# Patient Record
Sex: FEMALE | Race: WHITE
[De-identification: names, ages, dates, MRNs, and addresses within clinical notes are randomized per-mention and may not be internally consistent; named-entity substitution may affect disease eponyms.]

---

## 2017-01-31 ENCOUNTER — RX ONLY (OUTPATIENT)
Age: 52
Setting detail: RX ONLY
End: 2017-01-31

## 2017-02-13 RX ORDER — MONTELUKAST SODIUM 10 MG/1
TABLET ORAL
Qty: 90 TAB | Refills: 3 | Status: SHIPPED | OUTPATIENT
Start: 2017-02-13 | End: 2018-03-16 | Stop reason: SDUPTHER

## 2017-02-14 PROBLEM — D49.2 NEOPLASM OF UNSPECIFIED BEHAVIOR OF BONE, SOFT TISSUE, AND SKIN: Status: RESOLVED | Noted: 2017-01-31 | Resolved: 2017-02-14

## 2017-03-07 RX ORDER — SIMVASTATIN 40 MG
TABLET ORAL
Qty: 90 TAB | Refills: 3 | Status: SHIPPED | OUTPATIENT
Start: 2017-03-07 | End: 2017-08-21 | Stop reason: SINTOL

## 2017-08-21 DIAGNOSIS — E78.2 MIXED HYPERLIPIDEMIA: ICD-10-CM

## 2017-08-21 RX ORDER — PRAVASTATIN SODIUM 40 MG
40 TABLET ORAL
Qty: 90 TAB | Refills: 3 | Status: SHIPPED | OUTPATIENT
Start: 2017-08-21 | End: 2017-12-05 | Stop reason: SINTOL

## 2017-11-01 ENCOUNTER — APPOINTMENT (OUTPATIENT)
Dept: MEDICAL GROUP | Facility: PHYSICIAN GROUP | Age: 52
End: 2017-11-01
Payer: COMMERCIAL

## 2017-11-28 RX ORDER — CITALOPRAM 20 MG/1
TABLET ORAL
Qty: 90 TAB | Refills: 3 | Status: SHIPPED | OUTPATIENT
Start: 2017-11-28 | End: 2019-01-07 | Stop reason: SDUPTHER

## 2017-12-05 ENCOUNTER — OFFICE VISIT (OUTPATIENT)
Dept: MEDICAL GROUP | Facility: PHYSICIAN GROUP | Age: 52
End: 2017-12-05

## 2017-12-05 VITALS
TEMPERATURE: 97.9 F | RESPIRATION RATE: 14 BRPM | WEIGHT: 184 LBS | HEART RATE: 72 BPM | DIASTOLIC BLOOD PRESSURE: 78 MMHG | HEIGHT: 66 IN | BODY MASS INDEX: 29.57 KG/M2 | SYSTOLIC BLOOD PRESSURE: 142 MMHG | OXYGEN SATURATION: 95 %

## 2017-12-05 DIAGNOSIS — E78.2 MIXED HYPERLIPIDEMIA: ICD-10-CM

## 2017-12-05 DIAGNOSIS — J30.1 ACUTE SEASONAL ALLERGIC RHINITIS DUE TO POLLEN: ICD-10-CM

## 2017-12-05 DIAGNOSIS — R25.2 MUSCLE CRAMPS: ICD-10-CM

## 2017-12-05 DIAGNOSIS — J45.20 MILD INTERMITTENT ASTHMA WITHOUT COMPLICATION: ICD-10-CM

## 2017-12-05 DIAGNOSIS — M51.9 THORACIC DISC DISEASE: ICD-10-CM

## 2017-12-05 PROCEDURE — 99213 OFFICE O/P EST LOW 20 MIN: CPT | Performed by: FAMILY MEDICINE

## 2017-12-05 RX ORDER — UBIDECARENONE 100 MG
1 CAPSULE ORAL DAILY
Qty: 30 CAP | Refills: 3
Start: 2017-12-05 | End: 2018-08-28

## 2017-12-05 RX ORDER — ROSUVASTATIN CALCIUM 10 MG/1
10 TABLET, COATED ORAL EVERY EVENING
Qty: 90 TAB | Refills: 3 | Status: SHIPPED | OUTPATIENT
Start: 2017-12-05 | End: 2019-02-21 | Stop reason: SDUPTHER

## 2017-12-05 ASSESSMENT — PATIENT HEALTH QUESTIONNAIRE - PHQ9: CLINICAL INTERPRETATION OF PHQ2 SCORE: 0

## 2017-12-06 NOTE — PATIENT INSTRUCTIONS
Patient given written instructions regarding medications, referrals, dietary and lifestyle management, and return visit.    Sukumar Heller MD

## 2017-12-06 NOTE — PROGRESS NOTES
Patient comes in stating that she unfortunately got muscle cramps on pravastatin although they were not as bad as the cramps and been on simvastatin. She has also been seeing Dr. Gongora at the [facility] for disc disease in her thoracic spine and she's gotten epidurals which are helping with that. I'm concerned about the muscle cramps with the pravastatin now however.  She is actually quite a healthy person.  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    I reviewed the following    Past Medical History:   Diagnosis Date   • Depression         Past Surgical History:   Procedure Laterality Date   • TUBAL COAGULATION LAPAROSCOPIC BILATERAL  1994       Allergies   Allergen Reactions   • Miacalcin [Kdc:Calcitonin+Chlorobutanol]      Skin turns RED.   • Phenergan Vc With Codeine [Phenyleph-Promethazine-Cod]      Severe Dizziness   • Simvastatin      Muscle cramps   • Sulfa Drugs Hives       Current Outpatient Prescriptions   Medication Sig Dispense Refill   • rosuvastatin (CRESTOR) 10 MG Tab Take 1 Tab by mouth every evening. 90 Tab 3   • Coenzyme Q10 100 MG Cap Take 1 Cap by mouth every day. 30 Cap 3   • citalopram (CELEXA) 20 MG Tab TAKE 1 TABLET BY MOUTH ONCE DAILY. 90 Tab 3   • albuterol 108 (90 BASE) MCG/ACT Aero Soln inhalation aerosol Inhale 2 Puffs by mouth every four hours as needed for Shortness of Breath. 1 Inhaler 0   • montelukast (SINGULAIR) 10 MG Tab TAKE 1 TABLET BY MOUTH ONCE DAILY. 90 Tab 3   • fluticasone-salmeterol (ADVAIR) 500-50 MCG/DOSE AEROSOL POWDER, BREATH ACTIVATED Inhale 1 Puff by mouth every 12 hours. 1 Inhaler 3   • albuterol (VENTOLIN OR PROVENTIL) 108 (90 BASE) MCG/ACT Aero Soln inhalation aerosol Inhale 2 Puffs by mouth every four hours as needed for Shortness of Breath. 1 Inhaler 0   • cetirizine (ZYRTEC) 10 MG TABS Take 10 mg by mouth every day.     • Spirulina 500 MG TABS Take  by mouth.     • vitamin D (CHOLECALCIFEROL)  1000 UNIT TABS Take 2,000 Units by mouth every day.     • Probiotic Product (PROBIOTIC DAILY PO) Take  by mouth.     • Multiple Vitamins-Minerals (OCUVITE PO) Take  by mouth.     • CHASTE TREE PO Take 400 mg PE by mouth.     • oseltamivir (TAMIFLU) 75 MG Cap Take 1 Cap by mouth 2 times a day. 10 Cap 0   • doxycycline (VIBRAMYCIN) 100 MG Cap TAKE 1 CAPSULE BY MOUTH TWICE DAILY. 180 Cap 3   • fluticasone (FLONASE) 50 MCG/ACT nasal spray Spray 1 Spray in nose 2 times a day. 1 Spray each nostril twice a day 16 g 3   • Pseudoephedrine-APAP-DM (DAYQUIL PO) Take  by mouth.       No current facility-administered medications for this visit.         Family History   Problem Relation Age of Onset   • Cancer Father    • Heart Disease Brother        Social History     Social History   • Marital status: Single     Spouse name: N/A   • Number of children: N/A   • Years of education: N/A     Occupational History   • Not on file.     Social History Main Topics   • Smoking status: Former Smoker     Quit date: 1/1/1987   • Smokeless tobacco: Never Used   • Alcohol use 1.0 oz/week     2 Glasses of wine per week   • Drug use: No   • Sexual activity: Yes     Partners: Male     Birth control/ protection: Surgical     Other Topics Concern   • Not on file     Social History Narrative   • No narrative on file      The patient is well-developed well-nourished and in no acute distress    Pupils equally round and reactive to light    Ears normal    Nose normal    Throat clear    Neck supple no cervical adenopathy no thyromegaly    Chest clear to auscultation    Heart regular rhythm no murmur S3 or S4 appreciated    Back no CVA pain or spasm    Abdomen flat soft good bowel sounds no mass No hepatosplenomegaly no rebound    Skin no rashes or suspicious lesions    DTRs 2+ in ankles knees and biceps    Babinski downgoing bilaterally    Pulses 2+ in all 4 extremities    1. Mixed hyperlipidemia   Switch to Crestor 10 mg daily at bedtime   Discontinue  pravastatin    2. Thoracic disc disease   Continue to see Dr. Gongora    Sees Dr Gongora   3. Muscle cramps  rosuvastatin (CRESTOR) 10 MG Tab    Coenzyme Q10 100 MG Cap--one by mouth daily  She will recheck lipids at her health screening program at her work in about 2-3 months.    4. Acute seasonal allergic rhinitis due to pollen   Doing well.    5. Mild intermittent asthma without complication   Markedly improved.      This patient is actually quite healthy.    Please note that this dictation was created using voice recognition software. I have worked with consultants from the vendor as well as technical experts from [employer] to optimize the interface. I have made every reasonable attempt to correct obvious errors, but I expect that there are errors of grammar and possibly content that I did not discover before finalizing the note.

## 2018-02-13 DIAGNOSIS — L71.9 ACNE ROSACEA: ICD-10-CM

## 2018-02-13 RX ORDER — DOXYCYCLINE HYCLATE 100 MG/1
CAPSULE ORAL
Qty: 180 CAP | Refills: 3 | Status: SHIPPED | OUTPATIENT
Start: 2018-02-13 | End: 2018-08-28

## 2018-08-09 ENCOUNTER — TELEPHONE (OUTPATIENT)
Dept: MEDICAL GROUP | Facility: PHYSICIAN GROUP | Age: 53
End: 2018-08-09

## 2018-08-09 NOTE — TELEPHONE ENCOUNTER
"· Surgical Clearance paperwork received from [facility] requiring provider signature.     · All appropriate fields completed by Medical Assistant: No    · Paperwork placed in \"MA to Provider\" folder/basket. Awaiting provider completion/signature.  "

## 2018-08-28 ENCOUNTER — HOSPITAL ENCOUNTER (OUTPATIENT)
Dept: RADIOLOGY | Facility: MEDICAL CENTER | Age: 53
DRG: 473 | End: 2018-08-28
Attending: NEUROLOGICAL SURGERY
Payer: COMMERCIAL

## 2018-08-28 DIAGNOSIS — Z01.812 PRE-OPERATIVE LABORATORY EXAMINATION: ICD-10-CM

## 2018-08-28 DIAGNOSIS — Z01.810 PRE-OPERATIVE CARDIOVASCULAR EXAMINATION: ICD-10-CM

## 2018-08-28 DIAGNOSIS — Z01.811 PRE-OPERATIVE RESPIRATORY EXAMINATION: ICD-10-CM

## 2018-08-28 LAB
ANION GAP SERPL CALC-SCNC: 9 MMOL/L (ref 0–11.9)
APTT PPP: 27.8 SEC (ref 24.7–36)
BASOPHILS # BLD AUTO: 0.8 % (ref 0–1.8)
BASOPHILS # BLD: 0.06 K/UL (ref 0–0.12)
BUN SERPL-MCNC: 17 MG/DL (ref 8–22)
CALCIUM SERPL-MCNC: 9.5 MG/DL (ref 8.5–10.5)
CHLORIDE SERPL-SCNC: 102 MMOL/L (ref 96–112)
CO2 SERPL-SCNC: 26 MMOL/L (ref 20–33)
CREAT SERPL-MCNC: 0.78 MG/DL (ref 0.5–1.4)
EKG IMPRESSION: NORMAL
EOSINOPHIL # BLD AUTO: 0.3 K/UL (ref 0–0.51)
EOSINOPHIL NFR BLD: 4.1 % (ref 0–6.9)
ERYTHROCYTE [DISTWIDTH] IN BLOOD BY AUTOMATED COUNT: 43.8 FL (ref 35.9–50)
GLUCOSE SERPL-MCNC: 96 MG/DL (ref 65–99)
HCT VFR BLD AUTO: 45 % (ref 37–47)
HGB BLD-MCNC: 14.3 G/DL (ref 12–16)
IMM GRANULOCYTES # BLD AUTO: 0.02 K/UL (ref 0–0.11)
IMM GRANULOCYTES NFR BLD AUTO: 0.3 % (ref 0–0.9)
INR PPP: 0.9 (ref 0.87–1.13)
LYMPHOCYTES # BLD AUTO: 2.14 K/UL (ref 1–4.8)
LYMPHOCYTES NFR BLD: 29.2 % (ref 22–41)
MCH RBC QN AUTO: 29.7 PG (ref 27–33)
MCHC RBC AUTO-ENTMCNC: 31.8 G/DL (ref 33.6–35)
MCV RBC AUTO: 93.6 FL (ref 81.4–97.8)
MONOCYTES # BLD AUTO: 0.71 K/UL (ref 0–0.85)
MONOCYTES NFR BLD AUTO: 9.7 % (ref 0–13.4)
NEUTROPHILS # BLD AUTO: 4.11 K/UL (ref 2–7.15)
NEUTROPHILS NFR BLD: 55.9 % (ref 44–72)
NRBC # BLD AUTO: 0 K/UL
NRBC BLD-RTO: 0 /100 WBC
PLATELET # BLD AUTO: 251 K/UL (ref 164–446)
PMV BLD AUTO: 10.3 FL (ref 9–12.9)
POTASSIUM SERPL-SCNC: 3.7 MMOL/L (ref 3.6–5.5)
PROTHROMBIN TIME: 11.9 SEC (ref 12–14.6)
RBC # BLD AUTO: 4.81 M/UL (ref 4.2–5.4)
SODIUM SERPL-SCNC: 137 MMOL/L (ref 135–145)
WBC # BLD AUTO: 7.3 K/UL (ref 4.8–10.8)

## 2018-08-28 PROCEDURE — 36415 COLL VENOUS BLD VENIPUNCTURE: CPT

## 2018-08-28 PROCEDURE — 80048 BASIC METABOLIC PNL TOTAL CA: CPT

## 2018-08-28 PROCEDURE — 71046 X-RAY EXAM CHEST 2 VIEWS: CPT

## 2018-08-28 PROCEDURE — 85730 THROMBOPLASTIN TIME PARTIAL: CPT

## 2018-08-28 PROCEDURE — 85025 COMPLETE CBC W/AUTO DIFF WBC: CPT

## 2018-08-28 PROCEDURE — 93010 ELECTROCARDIOGRAM REPORT: CPT | Performed by: INTERNAL MEDICINE

## 2018-08-28 PROCEDURE — 93005 ELECTROCARDIOGRAM TRACING: CPT

## 2018-08-28 PROCEDURE — 85610 PROTHROMBIN TIME: CPT

## 2018-09-10 ENCOUNTER — APPOINTMENT (OUTPATIENT)
Dept: RADIOLOGY | Facility: MEDICAL CENTER | Age: 53
DRG: 473 | End: 2018-09-10
Attending: NEUROLOGICAL SURGERY
Payer: COMMERCIAL

## 2018-09-10 ENCOUNTER — HOSPITAL ENCOUNTER (INPATIENT)
Facility: MEDICAL CENTER | Age: 53
LOS: 1 days | DRG: 473 | End: 2018-09-11
Attending: NEUROLOGICAL SURGERY | Admitting: NEUROLOGICAL SURGERY
Payer: COMMERCIAL

## 2018-09-10 DIAGNOSIS — M54.12 CERVICAL RADICULOPATHY: ICD-10-CM

## 2018-09-10 PROCEDURE — 160002 HCHG RECOVERY MINUTES (STAT): Performed by: NEUROLOGICAL SURGERY

## 2018-09-10 PROCEDURE — C1713 ANCHOR/SCREW BN/BN,TIS/BN: HCPCS | Performed by: NEUROLOGICAL SURGERY

## 2018-09-10 PROCEDURE — 700111 HCHG RX REV CODE 636 W/ 250 OVERRIDE (IP)

## 2018-09-10 PROCEDURE — 501838 HCHG SUTURE GENERAL: Performed by: NEUROLOGICAL SURGERY

## 2018-09-10 PROCEDURE — 92585 HCHG BER: CPT | Performed by: NEUROLOGICAL SURGERY

## 2018-09-10 PROCEDURE — 500367 HCHG DRAIN KIT, HEMOVAC: Performed by: NEUROLOGICAL SURGERY

## 2018-09-10 PROCEDURE — 700111 HCHG RX REV CODE 636 W/ 250 OVERRIDE (IP): Performed by: ANESTHESIOLOGY

## 2018-09-10 PROCEDURE — 72040 X-RAY EXAM NECK SPINE 2-3 VW: CPT

## 2018-09-10 PROCEDURE — A9270 NON-COVERED ITEM OR SERVICE: HCPCS | Performed by: NEUROLOGICAL SURGERY

## 2018-09-10 PROCEDURE — 0RT30ZZ RESECTION OF CERVICAL VERTEBRAL DISC, OPEN APPROACH: ICD-10-PCS | Performed by: NEUROLOGICAL SURGERY

## 2018-09-10 PROCEDURE — 500864 HCHG NEEDLE, SPINAL 18G: Performed by: NEUROLOGICAL SURGERY

## 2018-09-10 PROCEDURE — 500444 HCHG HEMOSTAT, SURGICEL 2X3: Performed by: NEUROLOGICAL SURGERY

## 2018-09-10 PROCEDURE — 770001 HCHG ROOM/CARE - MED/SURG/GYN PRIV*

## 2018-09-10 PROCEDURE — 95925 SOMATOSENSORY TESTING: CPT | Performed by: NEUROLOGICAL SURGERY

## 2018-09-10 PROCEDURE — 95861 NEEDLE EMG 2 EXTREMITIES: CPT | Performed by: NEUROLOGICAL SURGERY

## 2018-09-10 PROCEDURE — 700101 HCHG RX REV CODE 250

## 2018-09-10 PROCEDURE — 160029 HCHG SURGERY MINUTES - 1ST 30 MINS LEVEL 4: Performed by: NEUROLOGICAL SURGERY

## 2018-09-10 PROCEDURE — 502000 HCHG MISC OR IMPLANTS RC 0278: Performed by: NEUROLOGICAL SURGERY

## 2018-09-10 PROCEDURE — A9270 NON-COVERED ITEM OR SERVICE: HCPCS | Performed by: PHYSICIAN ASSISTANT

## 2018-09-10 PROCEDURE — 95940 IONM IN OPERATNG ROOM 15 MIN: CPT | Performed by: NEUROLOGICAL SURGERY

## 2018-09-10 PROCEDURE — 160009 HCHG ANES TIME/MIN: Performed by: NEUROLOGICAL SURGERY

## 2018-09-10 PROCEDURE — 160048 HCHG OR STATISTICAL LEVEL 1-5: Performed by: NEUROLOGICAL SURGERY

## 2018-09-10 PROCEDURE — A6402 STERILE GAUZE <= 16 SQ IN: HCPCS | Performed by: NEUROLOGICAL SURGERY

## 2018-09-10 PROCEDURE — 700112 HCHG RX REV CODE 229: Performed by: PHYSICIAN ASSISTANT

## 2018-09-10 PROCEDURE — 95937 NEUROMUSCULAR JUNCTION TEST: CPT | Performed by: NEUROLOGICAL SURGERY

## 2018-09-10 PROCEDURE — 700102 HCHG RX REV CODE 250 W/ 637 OVERRIDE(OP): Performed by: PHYSICIAN ASSISTANT

## 2018-09-10 PROCEDURE — 160041 HCHG SURGERY MINUTES - EA ADDL 1 MIN LEVEL 4: Performed by: NEUROLOGICAL SURGERY

## 2018-09-10 PROCEDURE — 700105 HCHG RX REV CODE 258: Performed by: PHYSICIAN ASSISTANT

## 2018-09-10 PROCEDURE — 160035 HCHG PACU - 1ST 60 MINS PHASE I: Performed by: NEUROLOGICAL SURGERY

## 2018-09-10 PROCEDURE — 700102 HCHG RX REV CODE 250 W/ 637 OVERRIDE(OP): Performed by: NEUROLOGICAL SURGERY

## 2018-09-10 PROCEDURE — 700111 HCHG RX REV CODE 636 W/ 250 OVERRIDE (IP): Performed by: PHYSICIAN ASSISTANT

## 2018-09-10 PROCEDURE — 110454 HCHG SHELL REV 250: Performed by: NEUROLOGICAL SURGERY

## 2018-09-10 PROCEDURE — 95939 C MOTOR EVOKED UPR&LWR LIMBS: CPT | Performed by: NEUROLOGICAL SURGERY

## 2018-09-10 PROCEDURE — 0RG20A0 FUSION OF 2 OR MORE CERVICAL VERTEBRAL JOINTS WITH INTERBODY FUSION DEVICE, ANTERIOR APPROACH, ANTERIOR COLUMN, OPEN APPROACH: ICD-10-PCS | Performed by: NEUROLOGICAL SURGERY

## 2018-09-10 DEVICE — SCREW ATL TRNS 4.0X14 SELF TAP VAR (1TX10+2TCX10=30): Type: IMPLANTABLE DEVICE | Status: FUNCTIONAL

## 2018-09-10 DEVICE — PLATE ANTERIOR CERVICAL 40MM (1TX1+2TCX1=3): Type: IMPLANTABLE DEVICE | Status: FUNCTIONAL

## 2018-09-10 RX ORDER — POLYETHYLENE GLYCOL 3350 17 G/17G
1 POWDER, FOR SOLUTION ORAL 2 TIMES DAILY PRN
Status: DISCONTINUED | OUTPATIENT
Start: 2018-09-10 | End: 2018-09-11 | Stop reason: HOSPADM

## 2018-09-10 RX ORDER — MONTELUKAST SODIUM 10 MG/1
10 TABLET ORAL DAILY
Status: DISCONTINUED | OUTPATIENT
Start: 2018-09-10 | End: 2018-09-11 | Stop reason: HOSPADM

## 2018-09-10 RX ORDER — CLONIDINE HYDROCHLORIDE 0.1 MG/1
0.1 TABLET ORAL EVERY 4 HOURS PRN
Status: DISCONTINUED | OUTPATIENT
Start: 2018-09-10 | End: 2018-09-11 | Stop reason: HOSPADM

## 2018-09-10 RX ORDER — BUPIVACAINE HYDROCHLORIDE AND EPINEPHRINE 5; 5 MG/ML; UG/ML
INJECTION, SOLUTION EPIDURAL; INTRACAUDAL; PERINEURAL
Status: DISCONTINUED | OUTPATIENT
Start: 2018-09-10 | End: 2018-09-10 | Stop reason: HOSPADM

## 2018-09-10 RX ORDER — DIPHENHYDRAMINE HYDROCHLORIDE 50 MG/ML
25 INJECTION INTRAMUSCULAR; INTRAVENOUS EVERY 6 HOURS PRN
Status: DISCONTINUED | OUTPATIENT
Start: 2018-09-10 | End: 2018-09-11 | Stop reason: HOSPADM

## 2018-09-10 RX ORDER — CITALOPRAM 20 MG/1
20 TABLET ORAL DAILY
Status: DISCONTINUED | OUTPATIENT
Start: 2018-09-10 | End: 2018-09-11 | Stop reason: HOSPADM

## 2018-09-10 RX ORDER — TRAMADOL HYDROCHLORIDE 50 MG/1
100 TABLET ORAL EVERY 6 HOURS PRN
Status: DISCONTINUED | OUTPATIENT
Start: 2018-09-10 | End: 2018-09-11 | Stop reason: HOSPADM

## 2018-09-10 RX ORDER — DIPHENHYDRAMINE HCL 25 MG
25 TABLET ORAL EVERY 6 HOURS PRN
Status: DISCONTINUED | OUTPATIENT
Start: 2018-09-10 | End: 2018-09-11 | Stop reason: HOSPADM

## 2018-09-10 RX ORDER — ACETAMINOPHEN 500 MG
500 TABLET ORAL EVERY 6 HOURS PRN
Status: DISCONTINUED | OUTPATIENT
Start: 2018-09-10 | End: 2018-09-11 | Stop reason: HOSPADM

## 2018-09-10 RX ORDER — BUDESONIDE AND FORMOTEROL FUMARATE DIHYDRATE 160; 4.5 UG/1; UG/1
2 AEROSOL RESPIRATORY (INHALATION)
Status: DISCONTINUED | OUTPATIENT
Start: 2018-09-10 | End: 2018-09-10

## 2018-09-10 RX ORDER — METHYLPREDNISOLONE 4 MG/1
TABLET ORAL
Qty: 1 KIT | Refills: 0 | Status: SHIPPED | OUTPATIENT
Start: 2018-09-10 | End: 2019-03-02

## 2018-09-10 RX ORDER — SODIUM CHLORIDE, SODIUM LACTATE, POTASSIUM CHLORIDE, CALCIUM CHLORIDE 600; 310; 30; 20 MG/100ML; MG/100ML; MG/100ML; MG/100ML
1000 INJECTION, SOLUTION INTRAVENOUS
Status: COMPLETED | OUTPATIENT
Start: 2018-09-10 | End: 2018-09-10

## 2018-09-10 RX ORDER — ESTRADIOL 0.05 MG/D
1 PATCH, EXTENDED RELEASE TRANSDERMAL
Status: ON HOLD | COMMUNITY
End: 2018-09-10

## 2018-09-10 RX ORDER — ROSUVASTATIN CALCIUM 5 MG/1
10 TABLET, COATED ORAL EVERY EVENING
Status: DISCONTINUED | OUTPATIENT
Start: 2018-09-10 | End: 2018-09-11 | Stop reason: HOSPADM

## 2018-09-10 RX ORDER — BUDESONIDE AND FORMOTEROL FUMARATE DIHYDRATE 160; 4.5 UG/1; UG/1
2 AEROSOL RESPIRATORY (INHALATION) 2 TIMES DAILY
Status: DISCONTINUED | OUTPATIENT
Start: 2018-09-10 | End: 2018-09-11 | Stop reason: HOSPADM

## 2018-09-10 RX ORDER — AMOXICILLIN 250 MG
1 CAPSULE ORAL
Status: DISCONTINUED | OUTPATIENT
Start: 2018-09-10 | End: 2018-09-11 | Stop reason: HOSPADM

## 2018-09-10 RX ORDER — CYCLOBENZAPRINE HCL 10 MG
10 TABLET ORAL EVERY 8 HOURS PRN
Status: DISCONTINUED | OUTPATIENT
Start: 2018-09-10 | End: 2018-09-11 | Stop reason: HOSPADM

## 2018-09-10 RX ORDER — LIDOCAINE HYDROCHLORIDE 10 MG/ML
INJECTION, SOLUTION INFILTRATION; PERINEURAL
Status: DISPENSED
Start: 2018-09-10 | End: 2018-09-10

## 2018-09-10 RX ORDER — ACETAMINOPHEN 10 MG/ML
1000 INJECTION, SOLUTION INTRAVENOUS
Status: DISCONTINUED | OUTPATIENT
Start: 2018-09-10 | End: 2018-09-11 | Stop reason: HOSPADM

## 2018-09-10 RX ORDER — BLUE-GREEN ALGAE 500 MG
1 TABLET ORAL EVERY EVENING
Status: DISCONTINUED | OUTPATIENT
Start: 2018-09-10 | End: 2018-09-10

## 2018-09-10 RX ORDER — CALCIUM CARBONATE 500 MG/1
500 TABLET, CHEWABLE ORAL 2 TIMES DAILY
Status: DISCONTINUED | OUTPATIENT
Start: 2018-09-10 | End: 2018-09-11 | Stop reason: HOSPADM

## 2018-09-10 RX ORDER — AMOXICILLIN 250 MG
1 CAPSULE ORAL NIGHTLY
Status: DISCONTINUED | OUTPATIENT
Start: 2018-09-10 | End: 2018-09-11 | Stop reason: HOSPADM

## 2018-09-10 RX ORDER — DOCUSATE SODIUM 100 MG/1
100 CAPSULE, LIQUID FILLED ORAL 2 TIMES DAILY
Status: DISCONTINUED | OUTPATIENT
Start: 2018-09-10 | End: 2018-09-11 | Stop reason: HOSPADM

## 2018-09-10 RX ORDER — HYDROCODONE BITARTRATE AND ACETAMINOPHEN 10; 325 MG/1; MG/1
1-2 TABLET ORAL EVERY 4 HOURS PRN
Qty: 75 TAB | Refills: 0 | Status: SHIPPED | OUTPATIENT
Start: 2018-09-10 | End: 2018-09-24

## 2018-09-10 RX ORDER — HYDROCODONE BITARTRATE AND ACETAMINOPHEN 10; 325 MG/1; MG/1
1-2 TABLET ORAL EVERY 4 HOURS PRN
Status: DISCONTINUED | OUTPATIENT
Start: 2018-09-10 | End: 2018-09-11 | Stop reason: HOSPADM

## 2018-09-10 RX ORDER — SODIUM CHLORIDE 9 MG/ML
INJECTION, SOLUTION INTRAVENOUS CONTINUOUS
Status: DISCONTINUED | OUTPATIENT
Start: 2018-09-10 | End: 2018-09-11 | Stop reason: HOSPADM

## 2018-09-10 RX ORDER — FLUTICASONE PROPIONATE 50 MCG
1 SPRAY, SUSPENSION (ML) NASAL 2 TIMES DAILY
Status: DISCONTINUED | OUTPATIENT
Start: 2018-09-10 | End: 2018-09-11 | Stop reason: HOSPADM

## 2018-09-10 RX ORDER — CETIRIZINE HYDROCHLORIDE 10 MG/1
10 TABLET ORAL DAILY
Status: DISCONTINUED | OUTPATIENT
Start: 2018-09-10 | End: 2018-09-11 | Stop reason: HOSPADM

## 2018-09-10 RX ORDER — SODIUM CHLORIDE, SODIUM LACTATE, POTASSIUM CHLORIDE, AND CALCIUM CHLORIDE .6; .31; .03; .02 G/100ML; G/100ML; G/100ML; G/100ML
IRRIGANT IRRIGATION
Status: DISCONTINUED | OUTPATIENT
Start: 2018-09-10 | End: 2018-09-10 | Stop reason: HOSPADM

## 2018-09-10 RX ORDER — ALBUTEROL SULFATE 90 UG/1
2 AEROSOL, METERED RESPIRATORY (INHALATION)
Status: DISCONTINUED | OUTPATIENT
Start: 2018-09-10 | End: 2018-09-11 | Stop reason: HOSPADM

## 2018-09-10 RX ORDER — CEFAZOLIN SODIUM 2 G/100ML
2 INJECTION, SOLUTION INTRAVENOUS EVERY 8 HOURS
Status: COMPLETED | OUTPATIENT
Start: 2018-09-10 | End: 2018-09-11

## 2018-09-10 RX ORDER — LIDOCAINE HYDROCHLORIDE 10 MG/ML
0.5 INJECTION, SOLUTION INFILTRATION; PERINEURAL
Status: ACTIVE | OUTPATIENT
Start: 2018-09-10 | End: 2018-09-11

## 2018-09-10 RX ORDER — DEXAMETHASONE SODIUM PHOSPHATE 4 MG/ML
4 INJECTION, SOLUTION INTRA-ARTICULAR; INTRALESIONAL; INTRAMUSCULAR; INTRAVENOUS; SOFT TISSUE EVERY 6 HOURS
Status: COMPLETED | OUTPATIENT
Start: 2018-09-10 | End: 2018-09-11

## 2018-09-10 RX ORDER — BACITRACIN 50000 [IU]/1
INJECTION, POWDER, FOR SOLUTION INTRAMUSCULAR
Status: DISCONTINUED | OUTPATIENT
Start: 2018-09-10 | End: 2018-09-10 | Stop reason: HOSPADM

## 2018-09-10 RX ORDER — ENEMA 19; 7 G/133ML; G/133ML
1 ENEMA RECTAL
Status: DISCONTINUED | OUTPATIENT
Start: 2018-09-10 | End: 2018-09-11 | Stop reason: HOSPADM

## 2018-09-10 RX ORDER — BISACODYL 10 MG
10 SUPPOSITORY, RECTAL RECTAL
Status: DISCONTINUED | OUTPATIENT
Start: 2018-09-10 | End: 2018-09-11 | Stop reason: HOSPADM

## 2018-09-10 RX ADMIN — ANTACID TABLETS 500 MG: 500 TABLET, CHEWABLE ORAL at 12:42

## 2018-09-10 RX ADMIN — CEFAZOLIN SODIUM 2 G: 2 INJECTION, SOLUTION INTRAVENOUS at 23:46

## 2018-09-10 RX ADMIN — CYCLOBENZAPRINE 10 MG: 10 TABLET, FILM COATED ORAL at 15:30

## 2018-09-10 RX ADMIN — VITAMIN D, TAB 1000IU (100/BT) 5000 UNITS: 25 TAB at 12:42

## 2018-09-10 RX ADMIN — DOCUSATE SODIUM 100 MG: 100 CAPSULE, LIQUID FILLED ORAL at 12:41

## 2018-09-10 RX ADMIN — CEFAZOLIN SODIUM 2 G: 2 INJECTION, SOLUTION INTRAVENOUS at 18:18

## 2018-09-10 RX ADMIN — FLUTICASONE PROPIONATE 50 MCG: 50 SPRAY, METERED NASAL at 18:19

## 2018-09-10 RX ADMIN — BUDESONIDE AND FORMOTEROL FUMARATE DIHYDRATE 2 PUFF: 160; 4.5 AEROSOL RESPIRATORY (INHALATION) at 23:42

## 2018-09-10 RX ADMIN — MONTELUKAST SODIUM 10 MG: 10 TABLET, FILM COATED ORAL at 12:41

## 2018-09-10 RX ADMIN — FENTANYL CITRATE 50 MCG: 50 INJECTION, SOLUTION INTRAMUSCULAR; INTRAVENOUS at 09:52

## 2018-09-10 RX ADMIN — CETIRIZINE HYDROCHLORIDE 10 MG: 10 TABLET, FILM COATED ORAL at 12:41

## 2018-09-10 RX ADMIN — DEXAMETHASONE SODIUM PHOSPHATE 4 MG: 4 INJECTION, SOLUTION INTRAMUSCULAR; INTRAVENOUS at 12:40

## 2018-09-10 RX ADMIN — HYDROCODONE BITARTRATE AND ACETAMINOPHEN 1 TABLET: 10; 325 TABLET ORAL at 18:17

## 2018-09-10 RX ADMIN — SODIUM CHLORIDE: 9 INJECTION, SOLUTION INTRAVENOUS at 10:00

## 2018-09-10 RX ADMIN — SENNOSIDES AND DOCUSATE SODIUM 1 TABLET: 8.6; 5 TABLET ORAL at 21:32

## 2018-09-10 RX ADMIN — HYDROCODONE BITARTRATE AND ACETAMINOPHEN 1 TABLET: 10; 325 TABLET ORAL at 18:19

## 2018-09-10 RX ADMIN — DEXAMETHASONE SODIUM PHOSPHATE 4 MG: 4 INJECTION, SOLUTION INTRAMUSCULAR; INTRAVENOUS at 23:46

## 2018-09-10 RX ADMIN — ROSUVASTATIN CALCIUM 10 MG: 5 TABLET, FILM COATED ORAL at 18:18

## 2018-09-10 RX ADMIN — HYDROCODONE BITARTRATE AND ACETAMINOPHEN 2 TABLET: 10; 325 TABLET ORAL at 12:41

## 2018-09-10 RX ADMIN — ANTACID TABLETS 500 MG: 500 TABLET, CHEWABLE ORAL at 18:17

## 2018-09-10 RX ADMIN — DEXAMETHASONE SODIUM PHOSPHATE 4 MG: 4 INJECTION, SOLUTION INTRAMUSCULAR; INTRAVENOUS at 18:18

## 2018-09-10 RX ADMIN — CITALOPRAM HYDROBROMIDE 20 MG: 20 TABLET ORAL at 12:41

## 2018-09-10 RX ADMIN — DOCUSATE SODIUM 100 MG: 100 CAPSULE, LIQUID FILLED ORAL at 18:17

## 2018-09-10 RX ADMIN — HYDROCODONE BITARTRATE AND ACETAMINOPHEN 1 TABLET: 10; 325 TABLET ORAL at 21:32

## 2018-09-10 RX ADMIN — SODIUM CHLORIDE, SODIUM LACTATE, POTASSIUM CHLORIDE, CALCIUM CHLORIDE 1000 ML: 600; 310; 30; 20 INJECTION, SOLUTION INTRAVENOUS at 06:26

## 2018-09-10 ASSESSMENT — LIFESTYLE VARIABLES
ON A TYPICAL DAY WHEN YOU DRINK ALCOHOL HOW MANY DRINKS DO YOU HAVE: 2
HOW MANY TIMES IN THE PAST YEAR HAVE YOU HAD 5 OR MORE DRINKS IN A DAY: 10
ALCOHOL_USE: YES
EVER_SMOKED: NEVER
TOTAL SCORE: 2
EVER HAD A DRINK FIRST THING IN THE MORNING TO STEADY YOUR NERVES TO GET RID OF A HANGOVER: NO
CONSUMPTION TOTAL: POSITIVE
EVER FELT BAD OR GUILTY ABOUT YOUR DRINKING: NO
TOTAL SCORE: 2
DOES PATIENT WANT TO STOP DRINKING: NO
HAVE YOU EVER FELT YOU SHOULD CUT DOWN ON YOUR DRINKING: YES
HAVE PEOPLE ANNOYED YOU BY CRITICIZING YOUR DRINKING: YES
EVER_SMOKED: NEVER
TOTAL SCORE: 2
AVERAGE NUMBER OF DAYS PER WEEK YOU HAVE A DRINK CONTAINING ALCOHOL: 5

## 2018-09-10 ASSESSMENT — PAIN SCALES - GENERAL
PAINLEVEL_OUTOF10: 3
PAINLEVEL_OUTOF10: 0
PAINLEVEL_OUTOF10: 4
PAINLEVEL_OUTOF10: 3
PAINLEVEL_OUTOF10: 4
PAINLEVEL_OUTOF10: 8
PAINLEVEL_OUTOF10: 3
PAINLEVEL_OUTOF10: 4
PAINLEVEL_OUTOF10: 4
PAINLEVEL_OUTOF10: 7
PAINLEVEL_OUTOF10: 3

## 2018-09-10 ASSESSMENT — COGNITIVE AND FUNCTIONAL STATUS - GENERAL
TOILETING: A LITTLE
MOVING FROM LYING ON BACK TO SITTING ON SIDE OF FLAT BED: A LITTLE
WALKING IN HOSPITAL ROOM: A LITTLE
SUGGESTED CMS G CODE MODIFIER DAILY ACTIVITY: CJ
HELP NEEDED FOR BATHING: A LITTLE
DRESSING REGULAR UPPER BODY CLOTHING: A LITTLE
MOVING TO AND FROM BED TO CHAIR: A LITTLE
CLIMB 3 TO 5 STEPS WITH RAILING: A LITTLE
MOBILITY SCORE: 20
DAILY ACTIVITIY SCORE: 20
DRESSING REGULAR LOWER BODY CLOTHING: A LITTLE
SUGGESTED CMS G CODE MODIFIER MOBILITY: CJ

## 2018-09-10 ASSESSMENT — PATIENT HEALTH QUESTIONNAIRE - PHQ9
1. LITTLE INTEREST OR PLEASURE IN DOING THINGS: NOT AT ALL
SUM OF ALL RESPONSES TO PHQ9 QUESTIONS 1 AND 2: 0
2. FEELING DOWN, DEPRESSED, IRRITABLE, OR HOPELESS: NOT AT ALL

## 2018-09-10 NOTE — OR SURGEON
Immediate Post OP Note    PreOp Diagnosis: C5-7 stenosis, HNP, radiculomyelopathy    PostOp Diagnosis: same    Procedure(s):  CERVICAL DISK AND FUSION ANTERIOR- C5-7 WITH PLATE - Wound Class: Clean with Drain    Surgeon(s):  Wallace Zamora M.D.    Anesthesiologist/Type of Anesthesia:  Anesthesiologist: Valente Bowers M.D./General    Surgical Staff:  Assistant: Lucia Ghotra P.A.-C.  Circulator: Corrine Verdin R.N.  Relief Circulator: Marianela Ocasio R.N.  Scrub Person: Danie Holloway  Radiology Technologist: Bart Rose    Specimens removed if any:  * No specimens in log *    Estimated Blood Loss: minimal    Findings: severe stenosis relieved, nice placement of hardware, no changes to ssep's, emg's, mep's, rln monitoring    Complications: none        9/10/2018 9:22 AM Wallace Zamora M.D.

## 2018-09-10 NOTE — PROGRESS NOTES
Received report from PACU nurse. Pt arrived to floor. AAOx4, reporting 7/10 pain, medicated per MAR. Ambulated bed to [facility].  is present in room. Pt is stating numbness and tingling in right hand that is new. Discussed plan of care. Pt resting comfortably, bed locked and in lowest position, call light in reach, treaded socks in place, no other needs at this time.

## 2018-09-10 NOTE — PROGRESS NOTES
Patient AAOx4, moves all extremities equally, bilaterally, denies numbness or tingling, tolerating po without n/v, pain well controlled 3/10 scale. Will discharge to room S149. Called spouse (Shailesh), went to voicemail.

## 2018-09-10 NOTE — CARE PLAN
Problem: Safety  Goal: Will remain free from falls  Outcome: PROGRESSING AS EXPECTED  Treaded socks in place, bed alarm on, call light in reach, appropriate assistive devices used, pt calls appropriately.    Problem: Venous Thromboembolism (VTW)/Deep Vein Thrombosis (DVT) Prevention:  Goal: Patient will participate in Venous Thrombosis (VTE)/Deep Vein Thrombosis (DVT)Prevention Measures  Outcome: PROGRESSING AS EXPECTED  SCDs on and in use. No swelling or redness in lower extremities. No other signs or symptoms of DVT.

## 2018-09-10 NOTE — OP REPORT
DATE OF SERVICE:  09/10/2018    PREOPERATIVE DIAGNOSIS:  C5-C6, C6-C7 stenosis with herniated nucleus pulposus   with radiculomyelopathy.    POSTOPERATIVE DIAGNOSIS:  C5-C6, C6-C7 stenosis with herniated nucleus   pulposus with radiculomyelopathy.    PROCEDURES PERFORMED:  Right-sided approach for C5-C6, C6-C7 anterior cervical   diskectomy and interbody fusion with placement of two 4WEB Titanium Truss and   interbody cages and placement of a Medtronic translational plate from C5-C7.    Six 14 mm self-tapping screws were used.    SURGEON:  Wallace Zamora MD    ASSISTANT:  Lucia Ghotra PA-C    ANESTHESIA:  The procedure was performed under general anesthesia.    ANESTHESIOLOGIST:  Valente Bowers MD    COMPLICATIONS:  There were no complications.    FINDINGS:  Include evidence of significant spinal canal compromise with stable   SSEPs, EMGs, MEPs, and recurrent laryngeal nerve monitoring.  Please note   that the neuro monitoring associates was used for the remote neuro monitoring.    IV FLUIDS:  Please see the anesthesia record.    URINE OUTPUT:  Please see the anesthesia record.    ESTIMATED BLOOD LOSS:  Please see the anesthesia record.    DISPOSITION:  The patient will be extubated and brought to the recovery room.    CLINICAL HISTORY:  The patient is a 53-year-old female who presents with   severe neck pain and right greater than left shooting arm pain.  She failed   conservative treatment.  Her MRI showed significant spinal canal compromise.    She works for [employer] and she lives in [place].  Surgery was recommended   given the significant spinal canal compromise seen on her MRI.  The patient   signed written informed consent.  The patient was brought to the operating   room and placed under general anesthesia.  The neck was prepped and draped in   the usual sterile fashion.  AP and lateral fluoroscopy confirmed a good   lordosis and helped localize the incision.  A timeout was performed.     Perioperative IV antibiotics and IV steroids were administered.  A right-sided   skin incision was made along the skin crease.  The incision was made down to   the platysma.  The platysma was incised.  A sharp subplatysmal dissection was   performed until I could palpate the carotid sheath.  At this point, blunt   dissection was used to exploit the natural plane between the carotid sheath   laterally and the trachea and esophagus medially.  The longus colli muscles   were undermined and radiolucent self-retaining retractors were placed.    Intraoperative fluoroscopy demonstrated the correct level.  A Leksell rongeur   was used to remove the anterior osteophytes.  Cubero pins were placed and the   appropriate amount of distraction was achieved.  An upbiting curette was used   to perform the diskectomy and prepare the endplates.  I used AM-12 drill bit   to even out the endplates.  I used an AM-8 drill bit to harvest cortical bone   for the cage.  I used a small upbiting curette to remove the rest of the   diskectomy.  I used an AM-8 drill bit to remove the posterior osteophytes and   decompress the spinal canal.  I used a small right angle hook to develop a   plane between the dura and the posterior longitudinal ligament.  A Kerrison 1,   Kerrison 2 and Kerrison 3 punch was used to perform the osteophytectomies,   decompress the dura, and decompress bilateral neural foramen.  The same   technique was performed at both levels.  I placed the appropriate size   lordotic titanium Truss interbody cage made by [employer] packed with local   autograft into each disk space.  The Cubero pins were removed.  I secured a   Medtronic translational plate by using six 14 mm self-tapping screws.  The   locking mechanism was engaged.  AP and lateral fluoroscopy demonstrated   perfect placement of hardware.  Significant spinal canal compromise was   relieved.  SSEPs, EMGs, MEPs, recurrent laryngeal nerve monitoring were stable    throughout the case.  Prior to closure, instruments, sponge and needle counts   were correct.  The wound was closed in anatomic layers and a sterile dressing   was applied.       ____________________________________     MD STEVE PONCE / ALLAN    DD:  09/10/2018 09:32:37  DT:  09/10/2018 09:48:33    D#:  [id number]  Job#:  [id number]

## 2018-09-11 VITALS
TEMPERATURE: 97.8 F | BODY MASS INDEX: 29.69 KG/M2 | HEART RATE: 72 BPM | DIASTOLIC BLOOD PRESSURE: 60 MMHG | RESPIRATION RATE: 18 BRPM | SYSTOLIC BLOOD PRESSURE: 115 MMHG | WEIGHT: 184.75 LBS | OXYGEN SATURATION: 95 % | HEIGHT: 66 IN

## 2018-09-11 PROCEDURE — 97165 OT EVAL LOW COMPLEX 30 MIN: CPT

## 2018-09-11 PROCEDURE — G8980 MOBILITY D/C STATUS: HCPCS | Mod: CI

## 2018-09-11 PROCEDURE — G8979 MOBILITY GOAL STATUS: HCPCS | Mod: CI

## 2018-09-11 PROCEDURE — G8989 SELF CARE D/C STATUS: HCPCS | Mod: CI

## 2018-09-11 PROCEDURE — A9270 NON-COVERED ITEM OR SERVICE: HCPCS | Performed by: PHYSICIAN ASSISTANT

## 2018-09-11 PROCEDURE — G8987 SELF CARE CURRENT STATUS: HCPCS | Mod: CI

## 2018-09-11 PROCEDURE — G8988 SELF CARE GOAL STATUS: HCPCS | Mod: CI

## 2018-09-11 PROCEDURE — 700112 HCHG RX REV CODE 229: Performed by: PHYSICIAN ASSISTANT

## 2018-09-11 PROCEDURE — 700102 HCHG RX REV CODE 250 W/ 637 OVERRIDE(OP): Performed by: PHYSICIAN ASSISTANT

## 2018-09-11 PROCEDURE — 97161 PT EVAL LOW COMPLEX 20 MIN: CPT

## 2018-09-11 PROCEDURE — G8978 MOBILITY CURRENT STATUS: HCPCS | Mod: CI

## 2018-09-11 PROCEDURE — 700111 HCHG RX REV CODE 636 W/ 250 OVERRIDE (IP): Performed by: PHYSICIAN ASSISTANT

## 2018-09-11 RX ADMIN — CETIRIZINE HYDROCHLORIDE 10 MG: 10 TABLET, FILM COATED ORAL at 05:57

## 2018-09-11 RX ADMIN — MONTELUKAST SODIUM 10 MG: 10 TABLET, FILM COATED ORAL at 05:57

## 2018-09-11 RX ADMIN — HYDROCODONE BITARTRATE AND ACETAMINOPHEN 1 TABLET: 10; 325 TABLET ORAL at 02:33

## 2018-09-11 RX ADMIN — DEXAMETHASONE SODIUM PHOSPHATE 4 MG: 4 INJECTION, SOLUTION INTRAMUSCULAR; INTRAVENOUS at 05:57

## 2018-09-11 RX ADMIN — VITAMIN D, TAB 1000IU (100/BT) 5000 UNITS: 25 TAB at 08:08

## 2018-09-11 RX ADMIN — BUDESONIDE AND FORMOTEROL FUMARATE DIHYDRATE 2 PUFF: 160; 4.5 AEROSOL RESPIRATORY (INHALATION) at 05:57

## 2018-09-11 RX ADMIN — BENZOCAINE AND MENTHOL 1 LOZENGE: 15; 3.6 LOZENGE ORAL at 10:44

## 2018-09-11 RX ADMIN — CITALOPRAM HYDROBROMIDE 20 MG: 20 TABLET ORAL at 05:57

## 2018-09-11 RX ADMIN — HYDROCODONE BITARTRATE AND ACETAMINOPHEN 1 TABLET: 10; 325 TABLET ORAL at 07:13

## 2018-09-11 RX ADMIN — DOCUSATE SODIUM 100 MG: 100 CAPSULE, LIQUID FILLED ORAL at 05:57

## 2018-09-11 RX ADMIN — CEFAZOLIN SODIUM 2 G: 2 INJECTION, SOLUTION INTRAVENOUS at 08:08

## 2018-09-11 RX ADMIN — HYDROCODONE BITARTRATE AND ACETAMINOPHEN 2 TABLET: 10; 325 TABLET ORAL at 10:59

## 2018-09-11 RX ADMIN — FLUTICASONE PROPIONATE 50 MCG: 50 SPRAY, METERED NASAL at 05:57

## 2018-09-11 ASSESSMENT — GAIT ASSESSMENTS
GAIT LEVEL OF ASSIST: STAND BY ASSIST
DISTANCE (FEET): 200

## 2018-09-11 ASSESSMENT — COGNITIVE AND FUNCTIONAL STATUS - GENERAL
SUGGESTED CMS G CODE MODIFIER MOBILITY: CJ
MOVING FROM LYING ON BACK TO SITTING ON SIDE OF FLAT BED: A LITTLE
CLIMB 3 TO 5 STEPS WITH RAILING: A LITTLE
MOBILITY SCORE: 21
SUGGESTED CMS G CODE MODIFIER DAILY ACTIVITY: CH
DAILY ACTIVITIY SCORE: 24
MOVING TO AND FROM BED TO CHAIR: A LITTLE

## 2018-09-11 ASSESSMENT — PAIN SCALES - GENERAL
PAINLEVEL_OUTOF10: 5
PAINLEVEL_OUTOF10: 3
PAINLEVEL_OUTOF10: 5

## 2018-09-11 ASSESSMENT — ACTIVITIES OF DAILY LIVING (ADL): TOILETING: INDEPENDENT

## 2018-09-11 NOTE — DISCHARGE INSTRUCTIONS
Discharge Instructions    Discharged to home by car with relative. Discharged via wheelchair, hospital escort: Yes.  Special equipment needed: Not Applicable    Be sure to schedule a follow-up appointment with your primary care doctor or any specialists as instructed.     Discharge Plan:   Diet Plan: Discussed  Activity Level: Discussed  Confirmed Follow up Appointment: Patient to Call and Schedule Appointment  Confirmed Symptoms Management: Discussed  Medication Reconciliation Updated: Yes  Influenza Vaccine Indication: Not indicated: Previously immunized this influenza season and > 8 years of age    I understand that a diet low in cholesterol, fat, and sodium is recommended for good health. Unless I have been given specific instructions below for another diet, I accept this instruction as my diet prescription.   Other diet: regular    Special Instructions: None    · Is patient discharged on Warfarin / Coumadin?   No     Depression / Suicide Risk    As you are discharged from this [facility] facility, it is important to learn how to keep safe from harming yourself.    Recognize the warning signs:  · Abrupt changes in personality, positive or negative- including increase in energy   · Giving away possessions  · Change in eating patterns- significant weight changes-  positive or negative  · Change in sleeping patterns- unable to sleep or sleeping all the time   · Unwillingness or inability to communicate  · Depression  · Unusual sadness, discouragement and loneliness  · Talk of wanting to die  · Neglect of personal appearance   · Rebelliousness- reckless behavior  · Withdrawal from people/activities they love  · Confusion- inability to concentrate     If you or a loved one observes any of these behaviors or has concerns about self-harm, here's what you can do:  · Talk about it- your feelings and reasons for harming yourself  · Remove any means that you might use to hurt yourself (examples: pills, rope, extension  cords, firearm)  · Get professional help from the community (Mental Health, Substance Abuse, psychological counseling)  · Do not be alone:Call your Safe Contact- someone whom you trust who will be there for you.  · Call your local CRISIS HOTLINE 416-3068 or 733-271-2349  · Call your local Children's Mobile Crisis Response Team [place] (073) 125-4928 or www.[employer]  · Call the toll free National Suicide Prevention Hotlines   · National Suicide Prevention Lifeline 547-583-KCTP (2493)  · National Hope Line Network 800-SUICIDE (400-0702)    **Call office for follow up appointment in 2 weeks**

## 2018-09-11 NOTE — PROGRESS NOTES
Pt A&Ox4 with VSS. Pt endorses ambulating in room without difficulty nor device. O2 discontinued and pt stating in low 90s. Dressing to anterior neck is clean dry and intact and closed with gauze and transparent dressing. Plan to DC home today. Belongings and call light within reach. RN will continue to monitor.

## 2018-09-11 NOTE — DISCHARGE SUMMARY
ADMITTING DIAGNOSIS:  Cervical stenosis.    COURSE OF HOSPITALIZATION:  This patient was admitted to the [facility] on 09/10/2018 to undergo a C4 through C7 anterior cervical   diskectomy and fusion.  There were no intraoperative or postoperative   complications.  On postoperative day #1, the patient was able to take food and   fluids without any difficulty.  She reported some residual, but improved   right finger numbness and tingling.  She will be started back up on her   nortriptyline that she was using intermittently prior to her surgery.    Her vital signs are stable.  Her dressing is clean, dry, and intact.  There is   no palpable visible swelling.  She has good upper extremity strength.  Her   voice is strong.    DISCHARGE MEDICATIONS:  Include:  1.  Steroid taper Medrol, prednisolone pack.  2.  Milk of magnesia daily as needed.  3.  Norco 10/325, 75 tablets.  4.  Nortriptyline 10 mg one-half tablet to one tablet at nighttime as needed.  5.  Flexeril 10 mg. #60.    DISCHARGE INSTRUCTIONS:  1.  No aspirin for 1 week, no anti-inflammatories for 2 weeks.  2.  Daily laxative as needed.  Keep the dressing clean, dry, and intact.    IMPRESSION AND PLAN:  Status post successful C5-C7 ACDF with stable   postoperative course.       ____________________________________     REMEDIOS MILLER / ALLAN    DD:  09/11/2018 08:21:00  DT:  09/11/2018 09:55:32    D#:  [id number]  Job#:  [id number]    cc: FIOR ALCALA MD, SOURAV WHITTAKER

## 2018-09-11 NOTE — CARE PLAN
Problem: Pain Management  Goal: Pain level will decrease to patient's comfort goal  Outcome: PROGRESSING AS EXPECTED  Pt taking pain medication as prescribed and communicates needs when in pain.    Problem: Mobility  Goal: Risk for activity intolerance will decrease  Outcome: PROGRESSING AS EXPECTED  Pt endorses walking in hallways and ambulating in room without assistance nor device.

## 2018-09-11 NOTE — PROGRESS NOTES
Pt given discharge instructions and verbalized understanding. IV removed with no complications. Pt refused transport escort and walked out with daughter. Pt A&Ox4 at time of DC. All questions answered. Pt also signed opiate consent and verbalized understanding.

## 2018-09-11 NOTE — CARE PLAN
Problem: Safety  Goal: Will remain free from injury  Outcome: PROGRESSING AS EXPECTED  Pt has remained free from injury this visit. Pt calls for help appropriately and wears brace at all times as ordered.    Problem: Infection  Goal: Will remain free from infection  Outcome: PROGRESSING AS EXPECTED  No s/s of infection.

## 2018-09-11 NOTE — PROGRESS NOTES
Pt A/Ox4. Pt has a Aspin collar on at all times. Pt has an IV present and patent. Pt stating she has 4/10 pain in her neck anteriorly. Pt is on 1L via NC. Sx site dressing CDI. Pt has a Hemovac present compressed to suction. Pt mobilized in room with out assistive devices; pt has a steady gait. Bed locked and in lowest position, bed alarm is on. Call light and belongings within reach.     2 RN skin check complete. Pt has bruising on the inside of her right arm and small skin tear on right elbow.

## 2019-01-07 RX ORDER — CITALOPRAM 20 MG/1
TABLET ORAL
Qty: 90 TAB | Refills: 3 | Status: SHIPPED | OUTPATIENT
Start: 2019-01-07 | End: 2020-02-10 | Stop reason: SDUPTHER

## 2019-02-05 ENCOUNTER — APPOINTMENT (RX ONLY)
Dept: URBAN - METROPOLITAN AREA CLINIC 4 | Facility: CLINIC | Age: 54
Setting detail: DERMATOLOGY
End: 2019-02-05

## 2019-02-21 DIAGNOSIS — R25.2 MUSCLE CRAMPS: ICD-10-CM

## 2019-02-21 RX ORDER — ROSUVASTATIN CALCIUM 10 MG/1
TABLET, COATED ORAL
Qty: 90 TAB | Refills: 3 | Status: SHIPPED | OUTPATIENT
Start: 2019-02-21 | End: 2020-02-10 | Stop reason: SDUPTHER

## 2019-02-21 NOTE — TELEPHONE ENCOUNTER
Was the patient seen in the last year in this department? No  LAST SEEN 12/5/2017    Does patient have an active prescription for medications requested? No     Received Request Via: Pharmacy

## 2019-03-02 ENCOUNTER — OFFICE VISIT (OUTPATIENT)
Dept: URGENT CARE | Facility: PHYSICIAN GROUP | Age: 54
End: 2019-03-02
Payer: COMMERCIAL

## 2019-03-02 ENCOUNTER — APPOINTMENT (OUTPATIENT)
Dept: RADIOLOGY | Facility: IMAGING CENTER | Age: 54
End: 2019-03-02
Attending: PHYSICIAN ASSISTANT
Payer: COMMERCIAL

## 2019-03-02 VITALS
RESPIRATION RATE: 15 BRPM | HEIGHT: 66 IN | SYSTOLIC BLOOD PRESSURE: 128 MMHG | TEMPERATURE: 100.6 F | WEIGHT: 179 LBS | OXYGEN SATURATION: 93 % | BODY MASS INDEX: 28.77 KG/M2 | HEART RATE: 101 BPM | DIASTOLIC BLOOD PRESSURE: 78 MMHG

## 2019-03-02 DIAGNOSIS — R06.02 SOB (SHORTNESS OF BREATH): ICD-10-CM

## 2019-03-02 DIAGNOSIS — J40 BRONCHITIS: ICD-10-CM

## 2019-03-02 DIAGNOSIS — J01.00 ACUTE NON-RECURRENT MAXILLARY SINUSITIS: ICD-10-CM

## 2019-03-02 PROCEDURE — 99214 OFFICE O/P EST MOD 30 MIN: CPT | Mod: 25 | Performed by: PHYSICIAN ASSISTANT

## 2019-03-02 PROCEDURE — 94640 AIRWAY INHALATION TREATMENT: CPT | Performed by: PHYSICIAN ASSISTANT

## 2019-03-02 PROCEDURE — 71046 X-RAY EXAM CHEST 2 VIEWS: CPT | Mod: TC | Performed by: PHYSICIAN ASSISTANT

## 2019-03-02 RX ORDER — METHYLPREDNISOLONE 4 MG/1
TABLET ORAL
Qty: 21 TAB | Refills: 0 | Status: SHIPPED | OUTPATIENT
Start: 2019-03-02 | End: 2020-09-09

## 2019-03-02 RX ORDER — CODEINE PHOSPHATE AND GUAIFENESIN 10; 100 MG/5ML; MG/5ML
5 SOLUTION ORAL EVERY 4 HOURS PRN
Qty: 100 ML | Refills: 0 | Status: SHIPPED | OUTPATIENT
Start: 2019-03-02 | End: 2019-03-07

## 2019-03-02 RX ORDER — IPRATROPIUM BROMIDE AND ALBUTEROL SULFATE 2.5; .5 MG/3ML; MG/3ML
3 SOLUTION RESPIRATORY (INHALATION) ONCE
Status: COMPLETED | OUTPATIENT
Start: 2019-03-02 | End: 2019-03-02

## 2019-03-02 RX ORDER — DOXYCYCLINE HYCLATE 100 MG
100 TABLET ORAL 2 TIMES DAILY
Qty: 20 TAB | Refills: 0 | Status: SHIPPED | OUTPATIENT
Start: 2019-03-02 | End: 2019-11-12 | Stop reason: SDUPTHER

## 2019-03-02 RX ADMIN — IPRATROPIUM BROMIDE AND ALBUTEROL SULFATE 3 ML: 2.5; .5 SOLUTION RESPIRATORY (INHALATION) at 15:58

## 2019-03-02 ASSESSMENT — ENCOUNTER SYMPTOMS
CHILLS: 1
DIZZINESS: 0
SINUS PAIN: 1
CARDIOVASCULAR NEGATIVE: 1
GASTROINTESTINAL NEGATIVE: 1
COUGH: 1
WHEEZING: 1
SHORTNESS OF BREATH: 1
SPUTUM PRODUCTION: 1
RHINORRHEA: 1
HEADACHES: 1
MYALGIAS: 1
FEVER: 1
SORE THROAT: 0

## 2019-03-02 NOTE — PROGRESS NOTES
Subjective:      Kaley Dean is a 53 y.o. female who presents with Cough (x 3 weeks chest congestion also ) and Fever (yesterday )            Cough   This is a new problem. The current episode started 1 to 4 weeks ago. The problem has been gradually worsening. The problem occurs every few minutes. The cough is productive of sputum. Associated symptoms include chills, ear congestion, ear pain, a fever, headaches, myalgias, nasal congestion, rhinorrhea, shortness of breath and wheezing. Pertinent negatives include no sore throat. She has tried OTC cough suppressant, ipratropium inhaler, a beta-agonist inhaler and leukotriene antagonists for the symptoms. The treatment provided mild relief. Her past medical history is significant for asthma and bronchitis. There is no history of pneumonia.       PMH:  has a past medical history of Asthma (08/28/2018); Depression; and Pain (08/28/2018).  MEDS:   Current Outpatient Prescriptions:   •  rosuvastatin (CRESTOR) 10 MG Tab, TAKE 1 TABLET BY MOUTH ONCE DAILY IN THE EVENING., Disp: 90 Tab, Rfl: 3  •  citalopram (CELEXA) 20 MG Tab, TAKE 1 TABLET BY MOUTH ONCE DAILY., Disp: 90 Tab, Rfl: 3  •  magnesium hydroxide (MILK OF MAGNESIA) 400 MG/5ML Suspension, Take 30 mL by mouth 1 time daily as needed (if sennosides, docusate, and/or polyethylene glycol 3350 ineffective or not ordered)., Disp: 1 Bottle, Rfl: 0  •  MethylPREDNISolone (MEDROL DOSEPAK) 4 MG Tablet Therapy Pack, Take as directed, Disp: 1 Kit, Rfl: 0  •  montelukast (SINGULAIR) 10 MG Tab, TAKE 1 TABLET BY MOUTH ONCE DAILY., Disp: 90 Tab, Rfl: 3  •  fluticasone-salmeterol (ADVAIR) 500-50 MCG/DOSE AEROSOL POWDER, BREATH ACTIVATED, Inhale 1 Puff by mouth every 12 hours., Disp: 1 Inhaler, Rfl: 3  •  albuterol (VENTOLIN OR PROVENTIL) 108 (90 BASE) MCG/ACT Aero Soln inhalation aerosol, Inhale 2 Puffs by mouth every four hours as needed for Shortness of Breath., Disp: 1 Inhaler, Rfl: 0  •  fluticasone (FLONASE) 50 MCG/ACT  nasal spray, Spray 1 Spray in nose 2 times a day. 1 Spray each nostril twice a day, Disp: 16 g, Rfl: 3  •  cetirizine (ZYRTEC) 10 MG TABS, Take 10 mg by mouth every day., Disp: , Rfl:   •  Spirulina 500 MG TABS, Take 1 Tab by mouth every evening., Disp: , Rfl:   •  vitamin D (CHOLECALCIFEROL) 1000 UNIT TABS, Take 2,000 Units by mouth every day., Disp: , Rfl:   •  Probiotic Product (PROBIOTIC DAILY PO), Take 1 Tab by mouth every day., Disp: , Rfl:   •  Multiple Vitamins-Minerals (OCUVITE PO), Take 1 Tab by mouth every evening., Disp: , Rfl:   ALLERGIES:   Allergies   Allergen Reactions   • Miacalcin [Kdc:Calcitonin+Chlorobutanol]      Skin turns RED.   • Phenergan Vc With Codeine [Phenyleph-Promethazine-Cod]      Severe Dizziness   • Simvastatin      Muscle cramps   • Sulfa Drugs Hives     swelling     SURGHX:   Past Surgical History:   Procedure Laterality Date   • CERVICAL DISK AND FUSION ANTERIOR N/A 9/10/2018    Procedure: CERVICAL DISK AND FUSION ANTERIOR- C5-7 WITH PLATE;  Surgeon: Wallace Zamora M.D.;  Location: SURGERY [facility];  Service: Neurosurgery   • TUBAL COAGULATION LAPAROSCOPIC BILATERAL  1994     SOCHX:  reports that she quit smoking about 32 years ago. She has never used smokeless tobacco. She reports that she drinks about 1.0 oz of alcohol per week . She reports that she does not use drugs.  FH: family history includes Cancer in her father; Heart Disease in her brother.      Review of Systems   Constitutional: Positive for chills and fever.   HENT: Positive for congestion, ear pain, rhinorrhea and sinus pain. Negative for sore throat.    Respiratory: Positive for cough, sputum production, shortness of breath and wheezing.    Cardiovascular: Negative.    Gastrointestinal: Negative.    Musculoskeletal: Positive for myalgias.   Neurological: Positive for headaches. Negative for dizziness.   All other systems reviewed and are negative.      Medications, Allergies, and current problem list  "reviewed today in Epic  Family history reviewed with patient and is not pertinent for today's visit     Objective:     /78   Pulse (!) 101   Temp (!) 38.1 °C (100.6 °F) (Temporal)   Resp 15   Ht 1.676 m (5' 6\")   Wt 81.2 kg (179 lb)   SpO2 93%   BMI 28.89 kg/m²      Physical Exam   Constitutional: She is oriented to person, place, and time. She appears well-developed and well-nourished. No distress.   HENT:   Head: Normocephalic and atraumatic.   Right Ear: Tympanic membrane and external ear normal.   Left Ear: Tympanic membrane and external ear normal.   Nose: Mucosal edema present. Right sinus exhibits maxillary sinus tenderness. Left sinus exhibits maxillary sinus tenderness.   Mouth/Throat: Oropharynx is clear and moist. No oropharyngeal exudate.   Eyes: Pupils are equal, round, and reactive to light. Conjunctivae and EOM are normal. Right eye exhibits no discharge. Left eye exhibits no discharge.   Neck: Normal range of motion. Neck supple.   Cardiovascular: Normal rate, regular rhythm and normal heart sounds.    Pulmonary/Chest: Effort normal. No accessory muscle usage. Tachypnea noted. No respiratory distress. She has decreased breath sounds. She has wheezes. She has rhonchi. She has no rales.   Musculoskeletal: Normal range of motion.   Lymphadenopathy:     She has no cervical adenopathy.   Neurological: She is alert and oriented to person, place, and time.   Skin: Skin is warm and dry. She is not diaphoretic.   Psychiatric: She has a normal mood and affect. Her behavior is normal. Judgment and thought content normal.   Nursing note and vitals reviewed.              Assessment/Plan:     1. SOB (shortness of breath)  DX-CHEST-2 VIEWS    ipratropium-albuterol (DUONEB) nebulizer solution    MethylPREDNISolone (MEDROL DOSEPAK) 4 MG Tablet Therapy Pack   2. Acute non-recurrent maxillary sinusitis  MethylPREDNISolone (MEDROL DOSEPAK) 4 MG Tablet Therapy Pack    doxycycline (VIBRAMYCIN) 100 MG Tab   3. " Bronchitis  ipratropium-albuterol (DUONEB) nebulizer solution    MethylPREDNISolone (MEDROL DOSEPAK) 4 MG Tablet Therapy Pack    guaifenesin-codeine (ROBITUSSIN AC) Solution oral solution     Xray: NEG by my read. Radiology review pending.    Sinus infection including sinus pressure, headache, mucus.  Patient also having shortness of breath, wheezing.  On exam she has decreased breath sounds, scattered wheezes and rhonchi.  She has decreased pulse ox and a low-grade fever.  History of asthma.  An x-ray was ordered which was negative.  Appears to have bronchitis which is worsened by her history of asthma.  She was given a DuoNeb treatment in clinic.  She reports significant improvement in her symptoms.  Doxycycline and Medrol Dosepak for infection and inflammation  Cough medicine at night  OTC meds and conservative measures as discussed    [facility] Aware web site evaluation: I have obtained and reviewed patient utilization report from [facility] pharmacy database prior to writing prescription for controlled substance II, III or IV. Based on the report and my clinical assessment the prescription is medically necessary.   Patient is cautioned on sedation potential of narcotic medication; no drinking, driving or operating heavy machinery while on this medication.    Return to clinic or go to ED if symptoms worsen or persist. Indications for ED discussed at length. Patient voices understanding. Follow-up with your primary care provider in 3-5 days. Red flags discussed. All side effects of medication discussed including allergic response, GI upset, tendon injury, etc.    Please note that this dictation was created using voice recognition software. I have made every reasonable attempt to correct obvious errors, but I expect that there are errors of grammar and possibly content that I did not discover before finalizing the note.

## 2019-03-08 ENCOUNTER — OFFICE VISIT (OUTPATIENT)
Dept: URGENT CARE | Facility: PHYSICIAN GROUP | Age: 54
End: 2019-03-08
Payer: COMMERCIAL

## 2019-03-08 VITALS
SYSTOLIC BLOOD PRESSURE: 118 MMHG | BODY MASS INDEX: 28.83 KG/M2 | HEART RATE: 104 BPM | DIASTOLIC BLOOD PRESSURE: 74 MMHG | WEIGHT: 178.6 LBS | RESPIRATION RATE: 19 BRPM | TEMPERATURE: 97.4 F | OXYGEN SATURATION: 92 %

## 2019-03-08 DIAGNOSIS — R05.8 PRODUCTIVE COUGH: ICD-10-CM

## 2019-03-08 DIAGNOSIS — J98.01 BRONCHOSPASM: ICD-10-CM

## 2019-03-08 DIAGNOSIS — J01.91 ACUTE RECURRENT SINUSITIS, UNSPECIFIED LOCATION: ICD-10-CM

## 2019-03-08 DIAGNOSIS — J45.909 ACUTE ASTHMATIC BRONCHITIS: ICD-10-CM

## 2019-03-08 PROCEDURE — 94640 AIRWAY INHALATION TREATMENT: CPT | Performed by: NURSE PRACTITIONER

## 2019-03-08 PROCEDURE — 99214 OFFICE O/P EST MOD 30 MIN: CPT | Mod: 25 | Performed by: NURSE PRACTITIONER

## 2019-03-08 PROCEDURE — 94760 N-INVAS EAR/PLS OXIMETRY 1: CPT | Performed by: NURSE PRACTITIONER

## 2019-03-08 RX ORDER — AMOXICILLIN AND CLAVULANATE POTASSIUM 875; 125 MG/1; MG/1
TABLET, FILM COATED ORAL
COMMUNITY
Start: 2019-03-04 | End: 2019-03-08

## 2019-03-08 RX ORDER — AMOXICILLIN AND CLAVULANATE POTASSIUM 875; 125 MG/1; MG/1
1 TABLET, FILM COATED ORAL 2 TIMES DAILY
Qty: 14 TAB | Refills: 0 | Status: SHIPPED | OUTPATIENT
Start: 2019-03-08 | End: 2019-03-15

## 2019-03-08 RX ORDER — ALBUTEROL SULFATE 90 UG/1
2 AEROSOL, METERED RESPIRATORY (INHALATION) EVERY 6 HOURS PRN
Qty: 8.5 G | Refills: 0 | Status: SHIPPED | OUTPATIENT
Start: 2019-03-08 | End: 2020-09-09

## 2019-03-08 RX ORDER — BENZONATATE 100 MG/1
100 CAPSULE ORAL 3 TIMES DAILY PRN
Qty: 60 CAP | Refills: 0 | Status: SHIPPED | OUTPATIENT
Start: 2019-03-08 | End: 2020-09-09

## 2019-03-08 RX ORDER — IPRATROPIUM BROMIDE AND ALBUTEROL SULFATE 2.5; .5 MG/3ML; MG/3ML
3 SOLUTION RESPIRATORY (INHALATION) ONCE
Status: COMPLETED | OUTPATIENT
Start: 2019-03-08 | End: 2019-03-08

## 2019-03-08 RX ADMIN — IPRATROPIUM BROMIDE AND ALBUTEROL SULFATE 3 ML: 2.5; .5 SOLUTION RESPIRATORY (INHALATION) at 14:32

## 2019-03-08 NOTE — PROGRESS NOTES
Chief Complaint   Patient presents with   • Cough     not feeling better       HISTORY OF PRESENT ILLNESS: Patient is a 53 y.o. female who presents today due to symptoms that started over one month ago. Pt reports a productive cough. Reports associated nasal congestion, sinus pressure,  fever, chills, fatigue, malaise, wheezing, and body aches. Denies chest pain, shortness of breath, sore throat. Admits to h/o asthma, has run out of her albuterol. No immunocompromise. Has tried OTC cold medications without significant relief of symptoms.  Was seen on 3/2/19 for the same, at that time was given Medrol and doxycycline for suspected bronchitis and sinusitis.  Denies much improvement of her symptoms.  No other aggravating or alleviating factors.     Patient Active Problem List    Diagnosis Date Noted   • Personal history of colonic polyps 09/26/2016   • Acne rosacea 04/24/2015   • Bilateral dry eyes 01/16/2015   • Menopause 01/13/2015   • Hemorrhoids 01/13/2015   • Allergic rhinitis 01/13/2015   • Mixed hyperlipidemia 01/13/2015   • Asthma 01/13/2015   • Decreased hearing of both ears 01/13/2015   • Encounter for routine gynecological examination 01/13/2015       Allergies:Miacalcin [kdc:calcitonin+chlorobutanol]; Phenergan vc with codeine [phenyleph-promethazine-cod]; Simvastatin; and Sulfa drugs    Current Outpatient Prescriptions [facility]   Medication Sig Dispense Refill   • amoxicillin-clavulanate (AUGMENTIN) 875-125 MG Tab Take 1 Tab by mouth 2 times a day for 7 days. 14 Tab 0   • benzonatate (TESSALON) 100 MG Cap Take 1 Cap by mouth 3 times a day as needed for Cough. 60 Cap 0   • albuterol 108 (90 Base) MCG/ACT Aero Soln inhalation aerosol Inhale 2 Puffs by mouth every 6 hours as needed for Shortness of Breath. 8.5 g 0   • Spacer/Aero Chamber Mouthpiece Misc 1 Device by Does not apply route as needed. 1 Device 0   • MethylPREDNISolone (MEDROL DOSEPAK) 4 MG Tablet Therapy Pack Medrol Dosepack, Use as  directed 21 Tab 0   • doxycycline (VIBRAMYCIN) 100 MG Tab Take 1 Tab by mouth 2 times a day. 20 Tab 0   • rosuvastatin (CRESTOR) 10 MG Tab TAKE 1 TABLET BY MOUTH ONCE DAILY IN THE EVENING. 90 Tab 3   • citalopram (CELEXA) 20 MG Tab TAKE 1 TABLET BY MOUTH ONCE DAILY. 90 Tab 3   • magnesium hydroxide (MILK OF MAGNESIA) 400 MG/5ML Suspension Take 30 mL by mouth 1 time daily as needed (if sennosides, docusate, and/or polyethylene glycol 3350 ineffective or not ordered). 1 Bottle 0   • montelukast (SINGULAIR) 10 MG Tab TAKE 1 TABLET BY MOUTH ONCE DAILY. 90 Tab 3   • fluticasone-salmeterol (ADVAIR) 500-50 MCG/DOSE AEROSOL POWDER, BREATH ACTIVATED Inhale 1 Puff by mouth every 12 hours. 1 Inhaler 3   • albuterol (VENTOLIN OR PROVENTIL) 108 (90 BASE) MCG/ACT Aero Soln inhalation aerosol Inhale 2 Puffs by mouth every four hours as needed for Shortness of Breath. 1 Inhaler 0   • fluticasone (FLONASE) 50 MCG/ACT nasal spray Spray 1 Spray in nose 2 times a day. 1 Spray each nostril twice a day 16 g 3   • cetirizine (ZYRTEC) 10 MG TABS Take 10 mg by mouth every day.     • Spirulina 500 MG TABS Take 1 Tab by mouth every evening.     • vitamin D (CHOLECALCIFEROL) 1000 UNIT TABS Take 2,000 Units by mouth every day.     • Probiotic Product (PROBIOTIC DAILY PO) Take 1 Tab by mouth every day.     • Multiple Vitamins-Minerals (OCUVITE PO) Take 1 Tab by mouth every evening.       Current Facility-Administered Medications Ordered in Epic   Medication Dose Route Frequency Provider Last Rate Last Dose   • ipratropium-albuterol (DUONEB) nebulizer solution  3 mL Nebulization Once ANNA Wilburn           Past Medical History:   Diagnosis Date   • Asthma 08/28/2018    with inhalers   • Depression    • Pain 08/28/2018    hands, arms, shoulder and neck       Social History   Substance Use Topics   • Smoking status: Former Smoker     Quit date: 1/1/1987   • Smokeless tobacco: Never Used   • Alcohol use 1.0 oz/week     2 Glasses of wine per  week      Comment: 1-5 per week       Family Status   Relation Status   • Mo Alive   • Fa  at age 78   • Bro  at age 52        suicide   • Bro  at age 24        MI   • Son Alive   • Son Alive     Family History   Problem Relation Age of Onset   • Cancer Father    • Heart Disease Brother        ROS:  Review of Systems   Constitutional: Positive for subjective fever, chills, fatigue, malaise. Negative for weight loss.  HENT: Positive for sinus pressure, congestion, ear pressure, and sore throat. Negative for ear pain, nosebleeds, and neck pain.    Eyes: Negative for vision changes.   Cardiovascular: Negative for chest pain, palpitations, orthopnea and leg swelling.   Respiratory: Positive for cough and sputum production, wheezing. Negative for shortness of breath.  Gastrointestinal: Negative for abdominal pain, nausea, vomiting or diarrhea.   Skin: Negative for rash, diaphoresis.     Exam:  Blood pressure 118/74, pulse (!) 104, temperature 36.3 °C (97.4 °F), temperature source Temporal, resp. rate 19, weight 81 kg (178 lb 9.6 oz), SpO2 92 %, not currently breastfeeding.  General: well-nourished, well-developed female in NAD  Head: normocephalic, atraumatic  Eyes: PERRLA, EOM within normal limits, no conjunctival injection, no scleral icterus, visual fields and acuity grossly intact.  Ears: normal shape and symmetry, no tenderness, no discharge. External canals are without any significant edema or erythema. Tympanic membranes are without any inflammation, no effusion. Gross auditory acuity is intact.  Nose: symmetrical without tenderness, mild discharge, erythema present bilateral nares.  Mouth/Throat: reasonable hygiene, no exudates or tonsillar enlargement. Mild erythema present.   Neck: no masses, range of motion within normal limits, no tracheal deviation.  Lymph: mild cervical adenopathy. No supraclavicular adenopathy.   Neuro: alert and oriented. Cranial nerves 1-12 grossly intact.    Cardiovascular: regular rate and rhythm without murmurs, rubs, or gallops. No edema.   Pulmonary: no distress. Chest is symmetrical with respiration. No wheezes, crackles. Rhonchi noted right lobes, mild.  Musculoskeletal: appropriate muscle tone, gait is stable.  Skin: warm, dry, intact, no clubbing, no cyanosis.   Psych: appropriate mood, affect, judgement.         Assessment/Plan:  1. Acute lower respiratory tract infection  amoxicillin-clavulanate (AUGMENTIN) 875-125 MG Tab   2. Acute recurrent sinusitis, unspecified location  amoxicillin-clavulanate (AUGMENTIN) 875-125 MG Tab   3. Productive cough  benzonatate (TESSALON) 100 MG Cap   4. Bronchospasm  albuterol 108 (90 Base) MCG/ACT Aero Soln inhalation aerosol    Spacer/Aero Chamber Mouthpiece Misc    ipratropium-albuterol (DUONEB) nebulizer solution         Previous clinic visit encounter reviewed, including chest x-ray, and considered in medical decision making today.  We will add additional Augmentin to regimen.  Albuterol as directed.  Tessalon Perles for cough.  The patient is also given a DuoNeb in clinic, she reports improvement of her symptoms, oxygen improved to 96%. Rest, increase fluids, hand and respiratory hygiene.   Supportive care, differential diagnoses, and indications for immediate follow-up discussed with patient.   Pathogenesis of diagnosis discussed including typical length and natural progression.  Instructed to return to clinic or nearest emergency department for any change in condition, further concerns, or worsening of symptoms.  Patient states understanding of the plan of care and discharge instructions.  Instructed to make an appointment with their primary care provider in the next 3-7 days if not significantly improving and for further care.         Please note that this dictation was created using voice recognition software. I have made every reasonable attempt to correct obvious errors, but I expect that there are errors of  grammar and possibly content that I did not discover before finalizing the note.  A mask was worn for the entire visit with the patient.     NISSA Wilburn.

## 2019-05-14 ENCOUNTER — APPOINTMENT (RX ONLY)
Dept: URBAN - METROPOLITAN AREA CLINIC 4 | Facility: CLINIC | Age: 54
Setting detail: DERMATOLOGY
End: 2019-05-14

## 2019-05-14 DIAGNOSIS — L81.4 OTHER MELANIN HYPERPIGMENTATION: ICD-10-CM

## 2019-05-14 DIAGNOSIS — D22 MELANOCYTIC NEVI: ICD-10-CM

## 2019-05-14 DIAGNOSIS — L82.1 OTHER SEBORRHEIC KERATOSIS: ICD-10-CM

## 2019-05-14 PROBLEM — D22.39 MELANOCYTIC NEVI OF OTHER PARTS OF FACE: Status: ACTIVE | Noted: 2019-05-14

## 2019-05-14 PROBLEM — D22.5 MELANOCYTIC NEVI OF TRUNK: Status: ACTIVE | Noted: 2019-05-14

## 2019-05-14 PROBLEM — D48.5 NEOPLASM OF UNCERTAIN BEHAVIOR OF SKIN: Status: ACTIVE | Noted: 2019-05-14

## 2019-05-14 PROCEDURE — 11102 TANGNTL BX SKIN SINGLE LES: CPT

## 2019-05-14 PROCEDURE — ? PATIENT SPECIFIC COUNSELING

## 2019-05-14 PROCEDURE — ? OBSERVATION AND MEASURE

## 2019-05-14 PROCEDURE — 99213 OFFICE O/P EST LOW 20 MIN: CPT | Mod: 25

## 2019-05-14 PROCEDURE — ? BIOPSY BY SHAVE METHOD

## 2019-05-14 PROCEDURE — ? COUNSELING

## 2019-05-14 ASSESSMENT — LOCATION ZONE DERM
LOCATION ZONE: TRUNK
LOCATION ZONE: HAND
LOCATION ZONE: FACE
LOCATION ZONE: LEG

## 2019-05-14 ASSESSMENT — LOCATION DETAILED DESCRIPTION DERM
LOCATION DETAILED: LEFT INFERIOR MEDIAL MALAR CHEEK
LOCATION DETAILED: LEFT BUTTOCK
LOCATION DETAILED: RIGHT BUTTOCK
LOCATION DETAILED: RIGHT PROXIMAL PRETIBIAL REGION
LOCATION DETAILED: RIGHT RADIAL DORSAL HAND

## 2019-05-14 ASSESSMENT — LOCATION SIMPLE DESCRIPTION DERM
LOCATION SIMPLE: RIGHT HAND
LOCATION SIMPLE: LEFT CHEEK
LOCATION SIMPLE: LEFT BUTTOCK
LOCATION SIMPLE: RIGHT PRETIBIAL REGION
LOCATION SIMPLE: RIGHT BUTTOCK

## 2019-05-14 NOTE — HPI: SKIN LESIONS
Is This A New Presentation, Or A Follow-Up?: Skin Lesions
How Severe Is Your Skin Lesion?: mild
Have Your Skin Lesions Been Treated?: not been treated
Which Family Member (Optional)?: Father passed due to MM

## 2019-05-14 NOTE — PROCEDURE: PATIENT SPECIFIC COUNSELING
Detail Level: Detailed
Cannot rule out BCC, looks normal, patient reports no changes within the last 2 years.  Monitor site for any changes, avoiding a biopsy on face if possible.  return to clinic if changes in size, shape or color.

## 2019-05-14 NOTE — PROCEDURE: OBSERVATION
Size Of Lesion: 2 mm
Morphology Per Location (Optional): see photo 05/14/2019
Detail Level: Detailed

## 2019-05-14 NOTE — PROCEDURE: BIOPSY BY SHAVE METHOD
Destruction After The Procedure: No
Additional Anesthesia Volume In Cc (Will Not Render If 0): 0
Depth Of Biopsy: dermis
Billing Type: Third-Party Bill
Biopsy Type: H and E
Anesthesia Volume In Cc: 1
Silver Nitrate Text: The wound bed was treated with silver nitrate after the biopsy was performed.
Wound Care: Aquaphor
Detail Level: Detailed
Post-Care Instructions: I reviewed with the patient in detail post-care instructions. Patient is to keep the biopsy site dry overnight, and then apply bacitracin or petroleum twice daily until healed.
Cryotherapy Text: The wound bed was treated with cryotherapy after the biopsy was performed.
Lab: [id number]
Consent: Written consent was obtained and risks were reviewed including but not limited to scarring, infection, bleeding, scabbing, incomplete removal, nerve damage and allergy to anesthesia.
Electrodesiccation Text: The wound bed was treated with electrodesiccation after the biopsy was performed.
Biopsy Method: Personna blade
Notification Instructions: Patient will be notified of biopsy results. However, patient instructed to call the office if not contacted within 2 weeks.
Type Of Destruction Used: Electrodesiccation
Was A Bandage Applied: Yes
Dressing: bandage
Lab Facility: 
Hemostasis: Drysol and Electrocautery
Electrodesiccation And Curettage Text: The wound bed was treated with electrodesiccation and curettage after the biopsy was performed.
Anesthesia Type: 1% lidocaine with 1:100,000 epinephrine and a 1:12 solution of 8.4% sodium bicarbonate
Size Of Lesion In Cm: 0.5

## 2019-06-25 RX ORDER — MONTELUKAST SODIUM 10 MG/1
TABLET ORAL
Qty: 90 TAB | Refills: 0 | Status: SHIPPED | OUTPATIENT
Start: 2019-06-25 | End: 2019-10-11 | Stop reason: SDUPTHER

## 2019-06-25 NOTE — TELEPHONE ENCOUNTER
Was the patient seen in the last year in this department? No last seen   12/05/2017      Does patient have an active prescription for medications requested? No     Received Request Via: Pharmacy

## 2019-09-11 ENCOUNTER — RX ONLY (OUTPATIENT)
Age: 54
Setting detail: RX ONLY
End: 2019-09-11

## 2019-09-11 RX ORDER — DOXYCYCLINE HYCLATE 100 MG/1
1 CAPSULE, GELATIN COATED ORAL BID
Qty: 60 | Refills: 0 | Status: ERX | COMMUNITY
Start: 2019-09-11

## 2019-10-11 NOTE — TELEPHONE ENCOUNTER
Was the patient seen in the last year in this department? No 12/05/17 Pt is schedule for 10/30/19 Please send in 30 day supply thank you    Does patient have an active prescription for medications requested? No     Received Request Via: Patient

## 2019-10-11 NOTE — TELEPHONE ENCOUNTER
Was the patient seen in the last year in this department? No  LOV  12/05/17 LABS  04/15/16    Does patient have an active prescription for medications requested? No     Received Request Via: Pharmacy

## 2019-10-13 RX ORDER — MONTELUKAST SODIUM 10 MG/1
10 TABLET ORAL DAILY
Qty: 90 TAB | Refills: 0 | Status: SHIPPED | OUTPATIENT
Start: 2019-10-13 | End: 2020-01-22 | Stop reason: SDUPTHER

## 2019-10-13 NOTE — TELEPHONE ENCOUNTER
Requested Prescriptions     Pending Prescriptions Disp Refills   • montelukast (SINGULAIR) 10 MG Tab 90 Tab 0     Sig: Take 1 Tab by mouth every day. LAST REFILL Patient needs to keep upcoming appointment with  PCP for future refills. 10/11/19   Sandy Duval M.D.

## 2019-11-12 ENCOUNTER — OFFICE VISIT (OUTPATIENT)
Dept: MEDICAL GROUP | Facility: PHYSICIAN GROUP | Age: 54
End: 2019-11-12
Payer: COMMERCIAL

## 2019-11-12 VITALS
RESPIRATION RATE: 12 BRPM | OXYGEN SATURATION: 95 % | WEIGHT: 176 LBS | TEMPERATURE: 97.2 F | HEIGHT: 66 IN | HEART RATE: 80 BPM | BODY MASS INDEX: 28.28 KG/M2 | DIASTOLIC BLOOD PRESSURE: 76 MMHG | SYSTOLIC BLOOD PRESSURE: 132 MMHG

## 2019-11-12 DIAGNOSIS — Z23 NEEDS FLU SHOT: ICD-10-CM

## 2019-11-12 DIAGNOSIS — Z20.5 EXPOSURE TO HEPATITIS C: ICD-10-CM

## 2019-11-12 DIAGNOSIS — E78.2 MIXED HYPERLIPIDEMIA: ICD-10-CM

## 2019-11-12 DIAGNOSIS — L71.9 ACNE ROSACEA: ICD-10-CM

## 2019-11-12 DIAGNOSIS — J01.00 ACUTE NON-RECURRENT MAXILLARY SINUSITIS: ICD-10-CM

## 2019-11-12 DIAGNOSIS — E55.9 VITAMIN D DEFICIENCY: ICD-10-CM

## 2019-11-12 PROCEDURE — 90686 IIV4 VACC NO PRSV 0.5 ML IM: CPT | Performed by: FAMILY MEDICINE

## 2019-11-12 PROCEDURE — 99214 OFFICE O/P EST MOD 30 MIN: CPT | Mod: 25 | Performed by: FAMILY MEDICINE

## 2019-11-12 PROCEDURE — 90471 IMMUNIZATION ADMIN: CPT | Performed by: FAMILY MEDICINE

## 2019-11-12 RX ORDER — PHENTERMINE HYDROCHLORIDE 30 MG/1
30 CAPSULE ORAL EVERY MORNING
COMMUNITY
End: 2021-07-27

## 2019-11-12 RX ORDER — CYCLOSPORINE 0.5 MG/ML
EMULSION OPHTHALMIC
COMMUNITY
Start: 2019-08-20

## 2019-11-12 RX ORDER — ESTRADIOL 0.05 MG/D
PATCH TRANSDERMAL
COMMUNITY
Start: 2019-10-11 | End: 2021-03-19

## 2019-11-12 RX ORDER — DOXYCYCLINE HYCLATE 100 MG
100 TABLET ORAL 2 TIMES DAILY
Qty: 20 TAB | Refills: 3 | Status: SHIPPED | OUTPATIENT
Start: 2019-11-12 | End: 2020-02-09 | Stop reason: SDUPTHER

## 2019-11-12 ASSESSMENT — PATIENT HEALTH QUESTIONNAIRE - PHQ9: CLINICAL INTERPRETATION OF PHQ2 SCORE: 0

## 2019-11-13 NOTE — PATIENT INSTRUCTIONS
Patient given written instructions regarding labs, imaging, medications, referrals, dietary and lifestyle management, and return visit.    Sukumar Heller MD

## 2019-11-13 NOTE — PROGRESS NOTES
Patient comes in with numerous issues.  She is due for lab work.  She had successful neck surgery last year and is delighted with the results.  She also had successful laser surgery for varicose veins, but I could not find any way to record that on her surgery section of this chart.  She feels well.  She is due for a flu shot.  She needs refills of some of her medications.      Review of Systems   Constitutional: Negative.  Negative for fever, chills, weight loss and malaise/fatigue.   HENT: Negative for hearing loss, ear pain, congestion, sore throat, neck pain and tinnitus.    Eyes: Negative for blurred vision, double vision and pain.   Respiratory: Negative for cough, hemoptysis, shortness of breath and wheezing.    Cardiovascular: Negative for chest pain, palpitations, orthopnea, claudication, leg swelling and PND.   Gastrointestinal: Negative for heartburn, nausea, vomiting, abdominal pain, diarrhea, constipation, blood in stool and melena.   Genitourinary: Negative for incontinence, dysuria, urgency, frequency and hematuria. Female-- Negative for pelvic pain, vaginal discharge, or abnormal bleeding. No breast lumps, or masses, nipple bleeding or discharge.   Musculoskeletal: Negative for myalgias, back pain and joint pain.   Skin: Negative for rash and itching. No lesions that will not heal.  Neurological: Negative for dizziness, tingling, tremors, focal weakness, seizures, loss of consciousness and headaches.   Endo/Heme/Allergies: Negative for environmental allergies and polydipsia.  Does not bruise/bleed easily.   Psychiatric/Behavioral: Negative for depression, memory loss and substance abuse.  The patient is not nervous/anxious and does not have insomnia.  All others negative.    I reviewed the following    Past Medical History:   Diagnosis Date   • Asthma 08/28/2018    with inhalers   • Depression    • Pain 08/28/2018    hands, arms, shoulder and neck        Past Surgical History:   Procedure Laterality  Date   • CERVICAL DISK AND FUSION ANTERIOR N/A 9/10/2018    Procedure: CERVICAL DISK AND FUSION ANTERIOR- C5-7 WITH PLATE;  Surgeon: Wallace Zamora M.D.;  Location: SURGERY [facility];  Service: Neurosurgery   • TUBAL COAGULATION LAPAROSCOPIC BILATERAL  1994       Allergies   Allergen Reactions   • Miacalcin [Kdc:Calcitonin+Chlorobutanol]      Skin turns RED.   • Phenergan Vc With Codeine [Phenyleph-Promethazine-Cod]      Severe Dizziness   • Simvastatin      Muscle cramps   • Sulfa Drugs Hives     swelling       Current Outpatient Medications   Medication Sig Dispense Refill   • phentermine 30 MG capsule Take 30 mg by mouth every morning.     • doxycycline (VIBRAMYCIN) 100 MG Tab Take 1 Tab by mouth 2 times a day. 20 Tab 3   • Beclomethasone Dipropionate (QNASL) 80 MCG/ACT Aero Soln Spray 1 Squirt in nose 2 Times a Day. 1 Inhaler 3   • montelukast (SINGULAIR) 10 MG Tab Take 1 Tab by mouth every day. LAST REFILL Patient needs to keep upcoming appointment with  PCP for future refills. 10/11/19 90 Tab 0   • citalopram (CELEXA) 20 MG Tab TAKE 1 TABLET BY MOUTH ONCE DAILY. 90 Tab 3   • fluticasone-salmeterol (ADVAIR) 500-50 MCG/DOSE AEROSOL POWDER, BREATH ACTIVATED Inhale 1 Puff by mouth every 12 hours. 1 Inhaler 3   • albuterol (VENTOLIN OR PROVENTIL) 108 (90 BASE) MCG/ACT Aero Soln inhalation aerosol Inhale 2 Puffs by mouth every four hours as needed for Shortness of Breath. 1 Inhaler 0   • cetirizine (ZYRTEC) 10 MG TABS Take 10 mg by mouth every day.     • Spirulina 500 MG TABS Take 1 Tab by mouth every evening.     • Probiotic Product (PROBIOTIC DAILY PO) Take 1 Tab by mouth every day.     • Multiple Vitamins-Minerals (OCUVITE PO) Take 1 Tab by mouth every evening.     • RESTASIS 0.05 % ophthalmic emulsion      • estradiol (CLIMARA) 0.05 MG/24HR PATCH WEEKLY      • progesterone (PROMETRIUM) 100 MG Cap      • benzonatate (TESSALON) 100 MG Cap Take 1 Cap by mouth 3 times a day as needed for Cough. (Patient not  taking: Reported on 2019) 60 Cap 0   • albuterol 108 (90 Base) MCG/ACT Aero Soln inhalation aerosol Inhale 2 Puffs by mouth every 6 hours as needed for Shortness of Breath. (Patient not taking: Reported on 2019) 8.5 g 0   • Spacer/Aero Chamber Mouthpiece Misc 1 Device by Does not apply route as needed. 1 Device 0   • MethylPREDNISolone (MEDROL DOSEPAK) 4 MG Tablet Therapy Pack Medrol Dosepack, Use as directed (Patient not taking: Reported on 2019) 21 Tab 0   • rosuvastatin (CRESTOR) 10 MG Tab TAKE 1 TABLET BY MOUTH ONCE DAILY IN THE EVENING. 90 Tab 3   • magnesium hydroxide (MILK OF MAGNESIA) 400 MG/5ML Suspension Take 30 mL by mouth 1 time daily as needed (if sennosides, docusate, and/or polyethylene glycol 3350 ineffective or not ordered). (Patient not taking: Reported on 2019) 1 Bottle 0   • vitamin D (CHOLECALCIFEROL) 1000 UNIT TABS Take 2,000 Units by mouth every day.       No current facility-administered medications for this visit.         Family History   Problem Relation Age of Onset   • Cancer Father    • Heart Disease Brother        Social History     Socioeconomic History   • Marital status:      Spouse name: Not on file   • Number of children: Not on file   • Years of education: Not on file   • Highest education level: Not on file   Occupational History   • Not on file   Social Needs   • Financial resource strain: Not on file   • Food insecurity:     Worry: Not on file     Inability: Not on file   • Transportation needs:     Medical: Not on file     Non-medical: Not on file   Tobacco Use   • Smoking status: Former Smoker     Last attempt to quit: 1987     Years since quittin.8   • Smokeless tobacco: Never Used   Substance and Sexual Activity   • Alcohol use: Yes     Alcohol/week: 1.0 oz     Types: 2 Glasses of wine per week     Comment: 1-5 per week   • Drug use: No   • Sexual activity: Yes     Partners: Male     Birth control/protection: Surgical   Lifestyle   •  Physical activity:     Days per week: Not on file     Minutes per session: Not on file   • Stress: Not on file   Relationships   • Social connections:     Talks on phone: Not on file     Gets together: Not on file     Attends L[employer] day service: Not on file     Active member of club or organization: Not on file     Attends meetings of clubs or organizations: Not on file     Relationship status: Not on file   • Intimate partner violence:     Fear of current or ex partner: Not on file     Emotionally abused: Not on file     Physically abused: Not on file     Forced sexual activity: Not on file   Other Topics Concern   • Not on file   Social History Narrative   • Not on file      Physical Exam   Constitutional: She is oriented. She appears well-developed and well-nourished. No distress.   HENT:  Head: Normocephalic and atraumatic.   Right Ear: External ear normal. Ear canal and TM normal   Left Ear: External ear normal. Ear canal and TM normal  Nose: Nose normal.   Mouth/Throat: Oropharynx is clear and moist.   Eyes: Conjunctivae and extraocular motions are normal. Pupils are equal, round, and reactive to light. Fundi benign bilaterally   Neck: No thyromegaly present.   Cardiovascular: Normal rate, regular rhythm, normal heart sounds and intact distal pulses.  Exam reveals no gallop.    No murmur heard.  Pulmonary/Chest: Effort normal and breath sounds normal. No respiratory distress. She has no wheezes. She has no rales.   Abdominal: Soft. Bowel sounds are normal. No hepatosplenomegaly She exhibits no distension. No tenderness. She has no rebound and no guarding.   Musculoskeletal: Normal range of motion. She exhibits no edema and no tenderness.   Lymphadenopathy:     She has no cervical adenopathy.   No supraclavicular adenopathy  Neurological: She is alert and oriented. She has normal reflexes.        Babinskis downgoing bilaterally   Skin: Skin is warm and dry. No rash noted. No erythema.   Psychiatric: She has a  normal mood and appropriate affect. Her behavior is normal. Judgment and thought content normal.     1. Needs flu shot  Influenza Vaccine Quad Injection (PF)   2. Mixed hyperlipidemia --needs reassessment CBC WITH DIFFERENTIAL    Comp Metabolic Panel    Lipid Profile    URINALYSIS,CULTURE IF INDICATED   3. Acne rosacea --doing well with doxycycline doxycycline (VIBRAMYCIN) 100 MG Tab   4. Vitamin D deficiency --needs reassessment VITAMIN D 25-HYDROXY   5. Acute non-recurrent maxillary sinusitis --needs reassessment doxycycline (VIBRAMYCIN) 100 MG Tab    Beclomethasone Dipropionate (QNASL) 80 MCG/ACT Aero Soln   6. Exposure to hepatitis C  HEP C VIRUS ANTIBODY     Please note that this dictation was created using voice recognition software. I have worked with consultants from the vendor as well as technical experts from [employer] to optimize the interface. I have made every reasonable attempt to correct obvious errors, but I expect that there are errors of grammar and possibly content that I did not discover before finalizing the note.    Recheck 1 year or as needed.  I have enjoyed having this patient in my practice.  She is a delightful person.

## 2020-01-22 RX ORDER — MONTELUKAST SODIUM 10 MG/1
10 TABLET ORAL DAILY
Qty: 90 TAB | Refills: 0 | Status: SHIPPED | OUTPATIENT
Start: 2020-01-22 | End: 2020-02-09 | Stop reason: SDUPTHER

## 2020-02-09 DIAGNOSIS — J01.00 ACUTE NON-RECURRENT MAXILLARY SINUSITIS: ICD-10-CM

## 2020-02-09 DIAGNOSIS — L71.9 ACNE ROSACEA: ICD-10-CM

## 2020-02-10 DIAGNOSIS — J01.00 ACUTE NON-RECURRENT MAXILLARY SINUSITIS: ICD-10-CM

## 2020-02-10 DIAGNOSIS — L71.9 ACNE ROSACEA: ICD-10-CM

## 2020-02-10 DIAGNOSIS — F32.89 OTHER DEPRESSION: ICD-10-CM

## 2020-02-10 DIAGNOSIS — E78.2 MIXED HYPERLIPIDEMIA: ICD-10-CM

## 2020-02-10 DIAGNOSIS — R25.2 MUSCLE CRAMPS: ICD-10-CM

## 2020-02-10 NOTE — TELEPHONE ENCOUNTER
Received request via: Patient    Was the patient seen in the last year in this department? Yes    Does the patient have an active prescription (recently filled or refills available) for medication(s) requested? yes, singulair refilled 1/22/2020 for #90

## 2020-02-11 RX ORDER — CITALOPRAM 20 MG/1
20 TABLET ORAL DAILY
Qty: 90 TAB | Refills: 3 | Status: SHIPPED | OUTPATIENT
Start: 2020-02-11 | End: 2021-05-21 | Stop reason: SDUPTHER

## 2020-02-11 RX ORDER — DOXYCYCLINE HYCLATE 100 MG
100 TABLET ORAL 2 TIMES DAILY
Qty: 20 TAB | Refills: 3 | OUTPATIENT
Start: 2020-02-11

## 2020-02-11 RX ORDER — DOXYCYCLINE HYCLATE 100 MG
100 TABLET ORAL 2 TIMES DAILY
Qty: 20 TAB | Refills: 3 | Status: SHIPPED | OUTPATIENT
Start: 2020-02-11 | End: 2022-05-04

## 2020-02-11 RX ORDER — MONTELUKAST SODIUM 10 MG/1
10 TABLET ORAL DAILY
Qty: 90 TAB | Refills: 0 | OUTPATIENT
Start: 2020-02-11

## 2020-02-11 RX ORDER — ROSUVASTATIN CALCIUM 10 MG/1
10 TABLET, COATED ORAL
Qty: 90 TAB | Refills: 3 | Status: SHIPPED | OUTPATIENT
Start: 2020-02-11 | End: 2021-12-22 | Stop reason: SDUPTHER

## 2020-02-11 RX ORDER — MONTELUKAST SODIUM 10 MG/1
10 TABLET ORAL DAILY
Qty: 90 TAB | Refills: 2 | Status: SHIPPED | OUTPATIENT
Start: 2020-02-11 | End: 2020-09-09 | Stop reason: SDUPTHER

## 2020-07-28 ENCOUNTER — APPOINTMENT (RX ONLY)
Dept: URBAN - METROPOLITAN AREA CLINIC 4 | Facility: CLINIC | Age: 55
Setting detail: DERMATOLOGY
End: 2020-07-28

## 2020-07-28 DIAGNOSIS — L70.0 ACNE VULGARIS: ICD-10-CM

## 2020-07-28 DIAGNOSIS — D18.0 HEMANGIOMA: ICD-10-CM

## 2020-07-28 DIAGNOSIS — L82.1 OTHER SEBORRHEIC KERATOSIS: ICD-10-CM

## 2020-07-28 DIAGNOSIS — D22 MELANOCYTIC NEVI: ICD-10-CM | Status: STABLE

## 2020-07-28 DIAGNOSIS — L81.4 OTHER MELANIN HYPERPIGMENTATION: ICD-10-CM

## 2020-07-28 PROBLEM — D22.39 MELANOCYTIC NEVI OF OTHER PARTS OF FACE: Status: ACTIVE | Noted: 2020-07-28

## 2020-07-28 PROBLEM — D22.5 MELANOCYTIC NEVI OF TRUNK: Status: ACTIVE | Noted: 2020-07-28

## 2020-07-28 PROBLEM — D18.01 HEMANGIOMA OF SKIN AND SUBCUTANEOUS TISSUE: Status: ACTIVE | Noted: 2020-07-28

## 2020-07-28 PROCEDURE — ? ADDITIONAL NOTES

## 2020-07-28 PROCEDURE — ? COUNSELING

## 2020-07-28 PROCEDURE — 99213 OFFICE O/P EST LOW 20 MIN: CPT

## 2020-07-28 PROCEDURE — ? PATIENT SPECIFIC COUNSELING

## 2020-07-28 PROCEDURE — ? OBSERVATION AND MEASURE

## 2020-07-28 ASSESSMENT — LOCATION DETAILED DESCRIPTION DERM
LOCATION DETAILED: LEFT INFERIOR MEDIAL MALAR CHEEK
LOCATION DETAILED: RIGHT RADIAL DORSAL HAND
LOCATION DETAILED: LEFT BUTTOCK
LOCATION DETAILED: RIGHT BUTTOCK
LOCATION DETAILED: INFERIOR THORACIC SPINE
LOCATION DETAILED: LEFT INFERIOR CENTRAL MALAR CHEEK

## 2020-07-28 ASSESSMENT — LOCATION ZONE DERM
LOCATION ZONE: TRUNK
LOCATION ZONE: FACE
LOCATION ZONE: HAND

## 2020-07-28 ASSESSMENT — LOCATION SIMPLE DESCRIPTION DERM
LOCATION SIMPLE: RIGHT BUTTOCK
LOCATION SIMPLE: LEFT BUTTOCK
LOCATION SIMPLE: RIGHT HAND
LOCATION SIMPLE: LEFT CHEEK
LOCATION SIMPLE: UPPER BACK

## 2020-07-28 NOTE — PROCEDURE: ADDITIONAL NOTES
Detail Level: Detailed
Additional Notes: The patient will call if she has a breakout and we will send A refill of DCN if needed.

## 2020-07-28 NOTE — PROCEDURE: COUNSELING
Detail Level: Detailed
Detail Level: Generalized
Topical Clindamycin Pregnancy And Lactation Text: This medication is Pregnancy Category B and is considered safe during pregnancy. It is unknown if it is excreted in breast milk.
Tetracycline Counseling: Patient counseled regarding possible photosensitivity and increased risk for sunburn.  Patient instructed to avoid sunlight, if possible.  When exposed to sunlight, patients should wear protective clothing, sunglasses, and sunscreen.  The patient was instructed to call the office immediately if the following severe adverse effects occur:  hearing changes, easy bruising/bleeding, severe headache, or vision changes.  The patient verbalized understanding of the proper use and possible adverse effects of tetracycline.  All of the patient's questions and concerns were addressed. Patient understands to avoid pregnancy while on therapy due to potential birth defects.
Sarecycline Counseling: Patient advised regarding possible photosensitivity and discoloration of the teeth, skin, lips, tongue and gums.  Patient instructed to avoid sunlight, if possible.  When exposed to sunlight, patients should wear protective clothing, sunglasses, and sunscreen.  The patient was instructed to call the office immediately if the following severe adverse effects occur:  hearing changes, easy bruising/bleeding, severe headache, or vision changes.  The patient verbalized understanding of the proper use and possible adverse effects of sarecycline.  All of the patient's questions and concerns were addressed.
Topical Retinoid Pregnancy And Lactation Text: This medication is Pregnancy Category C. It is unknown if this medication is excreted in breast milk.
Doxycycline Pregnancy And Lactation Text: This medication is Pregnancy Category D and not consider safe during pregnancy. It is also excreted in breast milk but is considered safe for shorter treatment courses.
Azithromycin Counseling:  I discussed with the patient the risks of azithromycin including but not limited to GI upset, allergic reaction, drug rash, diarrhea, and yeast infections.
Topical Sulfur Applications Counseling: Topical Sulfur Counseling: Patient counseled that this medication may cause skin irritation or allergic reactions.  In the event of skin irritation, the patient was advised to reduce the amount of the drug applied or use it less frequently.   The patient verbalized understanding of the proper use and possible adverse effects of topical sulfur application.  All of the patient's questions and concerns were addressed.
Birth Control Pills Pregnancy And Lactation Text: This medication should be avoided if pregnant and for the first 30 days post-partum.
Tetracycline Pregnancy And Lactation Text: This medication is Pregnancy Category D and not consider safe during pregnancy. It is also excreted in breast milk.
High Dose Vitamin A Counseling: Side effects reviewed, pt to contact office should one occur.
Detail Level: Zone
Erythromycin Counseling:  I discussed with the patient the risks of erythromycin including but not limited to GI upset, allergic reaction, drug rash, diarrhea, increase in liver enzymes, and yeast infections.
Azithromycin Pregnancy And Lactation Text: This medication is considered safe during pregnancy and is also secreted in breast milk.
Tazorac Counseling:  Patient advised that medication is irritating and drying.  Patient may need to apply sparingly and wash off after an hour before eventually leaving it on overnight.  The patient verbalized understanding of the proper use and possible adverse effects of tazorac.  All of the patient's questions and concerns were addressed.
Dapsone Counseling: I discussed with the patient the risks of dapsone including but not limited to hemolytic anemia, agranulocytosis, rashes, methemoglobinemia, kidney failure, peripheral neuropathy, headaches, GI upset, and liver toxicity.  Patients who start dapsone require monitoring including baseline LFTs and weekly CBCs for the first month, then every month thereafter.  The patient verbalized understanding of the proper use and possible adverse effects of dapsone.  All of the patient's questions and concerns were addressed.
Topical Sulfur Applications Pregnancy And Lactation Text: This medication is Pregnancy Category C and has an unknown safety profile during pregnancy. It is unknown if this topical medication is excreted in breast milk.
Benzoyl Peroxide Counseling: Patient counseled that medicine may cause skin irritation and bleach clothing.  In the event of skin irritation, the patient was advised to reduce the amount of the drug applied or use it less frequently.   The patient verbalized understanding of the proper use and possible adverse effects of benzoyl peroxide.  All of the patient's questions and concerns were addressed.
High Dose Vitamin A Pregnancy And Lactation Text: High dose vitamin A therapy is contraindicated during pregnancy and breast feeding.
Spironolactone Counseling: Patient advised regarding risks of diarrhea, abdominal pain, hyperkalemia, birth defects (for female patients), liver toxicity and renal toxicity. The patient may need blood work to monitor liver and kidney function and potassium levels while on therapy. The patient verbalized understanding of the proper use and possible adverse effects of spironolactone.  All of the patient's questions and concerns were addressed.
Erythromycin Pregnancy And Lactation Text: This medication is Pregnancy Category B and is considered safe during pregnancy. It is also excreted in breast milk.
Bactrim Counseling:  I discussed with the patient the risks of sulfa antibiotics including but not limited to GI upset, allergic reaction, drug rash, diarrhea, dizziness, photosensitivity, and yeast infections.  Rarely, more serious reactions can occur including but not limited to aplastic anemia, agranulocytosis, methemoglobinemia, blood dyscrasias, liver or kidney failure, lung infiltrates or desquamative/blistering drug rashes.
Tazorac Pregnancy And Lactation Text: This medication is not safe during pregnancy. It is unknown if this medication is excreted in breast milk.
Benzoyl Peroxide Pregnancy And Lactation Text: This medication is Pregnancy Category C. It is unknown if benzoyl peroxide is excreted in breast milk.
Minocycline Counseling: Patient advised regarding possible photosensitivity and discoloration of the teeth, skin, lips, tongue and gums.  Patient instructed to avoid sunlight, if possible.  When exposed to sunlight, patients should wear protective clothing, sunglasses, and sunscreen.  The patient was instructed to call the office immediately if the following severe adverse effects occur:  hearing changes, easy bruising/bleeding, severe headache, or vision changes.  The patient verbalized understanding of the proper use and possible adverse effects of minocycline.  All of the patient's questions and concerns were addressed.
Dapsone Pregnancy And Lactation Text: This medication is Pregnancy Category C and is not considered safe during pregnancy or breast feeding.
Isotretinoin Counseling: Patient should get monthly blood tests, not donate blood, not drive at night if vision affected, not share medication, and not undergo elective surgery for 6 months after tx completed. Side effects reviewed, pt to contact office should one occur.
Bactrim Pregnancy And Lactation Text: This medication is Pregnancy Category D and is known to cause fetal risk.  It is also excreted in breast milk.
Topical Clindamycin Counseling: Patient counseled that this medication may cause skin irritation or allergic reactions.  In the event of skin irritation, the patient was advised to reduce the amount of the drug applied or use it less frequently.   The patient verbalized understanding of the proper use and possible adverse effects of clindamycin.  All of the patient's questions and concerns were addressed.
Spironolactone Pregnancy And Lactation Text: This medication can cause feminization of the male fetus and should be avoided during pregnancy. The active metabolite is also found in breast milk.
Topical Retinoid counseling:  Patient advised to apply a pea-sized amount only at bedtime and wait 30 minutes after washing their face before applying.  If too drying, patient may add a non-comedogenic moisturizer. The patient verbalized understanding of the proper use and possible adverse effects of retinoids.  All of the patient's questions and concerns were addressed.
Include Pregnancy/Lactation Warning?: No
Doxycycline Counseling:  Patient counseled regarding possible photosensitivity and increased risk for sunburn.  Patient instructed to avoid sunlight, if possible.  When exposed to sunlight, patients should wear protective clothing, sunglasses, and sunscreen.  The patient was instructed to call the office immediately if the following severe adverse effects occur:  hearing changes, easy bruising/bleeding, severe headache, or vision changes.  The patient verbalized understanding of the proper use and possible adverse effects of doxycycline.  All of the patient's questions and concerns were addressed.
Isotretinoin Pregnancy And Lactation Text: This medication is Pregnancy Category X and is considered extremely dangerous during pregnancy. It is unknown if it is excreted in breast milk.
Birth Control Pills Counseling: Birth Control Pill Counseling: I discussed with the patient the potential side effects of OCPs including but not limited to increased risk of stroke, heart attack, thrombophlebitis, deep venous thrombosis, hepatic adenomas, breast changes, GI upset, headaches, and depression.  The patient verbalized understanding of the proper use and possible adverse effects of OCPs. All of the patient's questions and concerns were addressed.

## 2020-09-09 ENCOUNTER — TELEMEDICINE (OUTPATIENT)
Dept: MEDICAL GROUP | Facility: IMAGING CENTER | Age: 55
End: 2020-09-09
Payer: COMMERCIAL

## 2020-09-09 VITALS — WEIGHT: 184 LBS | BODY MASS INDEX: 29.57 KG/M2 | HEIGHT: 66 IN | TEMPERATURE: 98.6 F

## 2020-09-09 DIAGNOSIS — J45.40 MODERATE PERSISTENT ASTHMA WITHOUT COMPLICATION: ICD-10-CM

## 2020-09-09 DIAGNOSIS — J01.00 ACUTE NON-RECURRENT MAXILLARY SINUSITIS: ICD-10-CM

## 2020-09-09 DIAGNOSIS — Z23 NEED FOR VACCINATION: ICD-10-CM

## 2020-09-09 PROCEDURE — 99213 OFFICE O/P EST LOW 20 MIN: CPT | Performed by: FAMILY MEDICINE

## 2020-09-09 RX ORDER — DOXYCYCLINE HYCLATE 100 MG/1
CAPSULE ORAL
COMMUNITY
Start: 2020-08-19 | End: 2020-09-09

## 2020-09-09 RX ORDER — MONTELUKAST SODIUM 10 MG/1
10 TABLET ORAL DAILY
Qty: 90 TAB | Refills: 2 | Status: SHIPPED | OUTPATIENT
Start: 2020-09-09 | End: 2021-11-07 | Stop reason: SDUPTHER

## 2020-09-09 RX ORDER — BECLOMETHASONE DIPROPIONATE 80 UG/1
1 AEROSOL, METERED NASAL DAILY
Qty: 10.6 G | Refills: 0 | Status: SHIPPED | OUTPATIENT
Start: 2020-09-09 | End: 2020-12-18

## 2020-09-09 RX ORDER — FOLIC ACID 0.8 MG
TABLET ORAL
COMMUNITY

## 2020-09-09 RX ORDER — ALBUTEROL SULFATE 90 UG/1
2 AEROSOL, METERED RESPIRATORY (INHALATION) EVERY 4 HOURS PRN
Qty: 6.7 G | Refills: 3 | Status: SHIPPED | OUTPATIENT
Start: 2020-09-09 | End: 2022-02-10 | Stop reason: SDUPTHER

## 2020-09-09 ASSESSMENT — PAIN SCALES - GENERAL: PAINLEVEL: NO PAIN
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
## 2020-12-17 DIAGNOSIS — J01.00 ACUTE NON-RECURRENT MAXILLARY SINUSITIS: ICD-10-CM

## 2020-12-18 RX ORDER — BECLOMETHASONE DIPROPIONATE 80 UG/1
AEROSOL, METERED NASAL
Qty: 10.6 G | Refills: 0 | Status: SHIPPED | OUTPATIENT
Start: 2020-12-18 | End: 2021-08-27 | Stop reason: SDUPTHER

## 2021-02-23 ENCOUNTER — OFFICE VISIT (OUTPATIENT)
Dept: URGENT CARE | Facility: PHYSICIAN GROUP | Age: 56
End: 2021-02-23
Payer: COMMERCIAL

## 2021-02-23 ENCOUNTER — HOSPITAL ENCOUNTER (OUTPATIENT)
Facility: MEDICAL CENTER | Age: 56
End: 2021-02-23
Attending: PHYSICIAN ASSISTANT
Payer: COMMERCIAL

## 2021-02-23 VITALS
OXYGEN SATURATION: 99 % | HEIGHT: 66 IN | TEMPERATURE: 98.2 F | BODY MASS INDEX: 28.45 KG/M2 | HEART RATE: 68 BPM | WEIGHT: 177 LBS | SYSTOLIC BLOOD PRESSURE: 140 MMHG | RESPIRATION RATE: 16 BRPM | DIASTOLIC BLOOD PRESSURE: 90 MMHG

## 2021-02-23 DIAGNOSIS — N30.01 ACUTE CYSTITIS WITH HEMATURIA: ICD-10-CM

## 2021-02-23 LAB
APPEARANCE UR: NORMAL
BILIRUB UR STRIP-MCNC: NEGATIVE MG/DL
COLOR UR AUTO: NORMAL
GLUCOSE UR STRIP.AUTO-MCNC: NEGATIVE MG/DL
KETONES UR STRIP.AUTO-MCNC: NEGATIVE MG/DL
LEUKOCYTE ESTERASE UR QL STRIP.AUTO: NEGATIVE
NITRITE UR QL STRIP.AUTO: NEGATIVE
PH UR STRIP.AUTO: 6.5 [PH] (ref 5–8)
PROT UR QL STRIP: NORMAL MG/DL
RBC UR QL AUTO: NORMAL
SP GR UR STRIP.AUTO: 1.02
UROBILINOGEN UR STRIP-MCNC: 0.2 MG/DL

## 2021-02-23 PROCEDURE — 87086 URINE CULTURE/COLONY COUNT: CPT

## 2021-02-23 PROCEDURE — 81002 URINALYSIS NONAUTO W/O SCOPE: CPT | Performed by: PHYSICIAN ASSISTANT

## 2021-02-23 PROCEDURE — 99214 OFFICE O/P EST MOD 30 MIN: CPT | Performed by: PHYSICIAN ASSISTANT

## 2021-02-23 RX ORDER — NITROFURANTOIN 25; 75 MG/1; MG/1
100 CAPSULE ORAL 2 TIMES DAILY
Qty: 10 CAPSULE | Refills: 0 | Status: SHIPPED | OUTPATIENT
Start: 2021-02-23 | End: 2021-02-28

## 2021-02-23 ASSESSMENT — ENCOUNTER SYMPTOMS
CHILLS: 0
VOMITING: 0
DIARRHEA: 0
ABDOMINAL PAIN: 0
FLANK PAIN: 1
SHORTNESS OF BREATH: 0
NAUSEA: 0
DIZZINESS: 0
FEVER: 0

## 2021-02-24 DIAGNOSIS — N30.01 ACUTE CYSTITIS WITH HEMATURIA: ICD-10-CM

## 2021-02-24 NOTE — PROGRESS NOTES
"Subjective:      Kaley Dean is a 55 y.o. female who presents with Painful Urination (blood in urine, burning, x3 days )            HPI  Patient presents to urgent care reporting a 3 day history of hematuria. She also reports body aches and bilateral low back pain. No fevers, chills, abdominal pain, frequency, or urgency. No history of kidney stones. She is a former smoker, quit many years ago.       Review of Systems   Constitutional: Negative for chills and fever.   HENT: Negative for congestion.    Respiratory: Negative for shortness of breath.    Cardiovascular: Negative for chest pain.   Gastrointestinal: Negative for abdominal pain, diarrhea, nausea and vomiting.   Genitourinary: Positive for flank pain and hematuria. Negative for dysuria, frequency and urgency.   Skin: Negative for rash.   Neurological: Negative for dizziness.        Objective:     /90 (BP Location: Left arm, Patient Position: Sitting, BP Cuff Size: Adult)   Pulse 68   Temp 36.8 °C (98.2 °F) (Temporal)   Resp 16   Ht 1.676 m (5' 6\")   Wt 80.3 kg (177 lb)   SpO2 99%   BMI 28.57 kg/m²        Physical Exam  Vitals and nursing note reviewed.   Constitutional:       General: She is not in acute distress.     Appearance: Normal appearance. She is well-developed. She is not diaphoretic.   HENT:      Head: Normocephalic and atraumatic.      Right Ear: External ear normal.      Left Ear: External ear normal.      Nose: Nose normal.   Eyes:      Conjunctiva/sclera: Conjunctivae normal.   Cardiovascular:      Rate and Rhythm: Normal rate.   Pulmonary:      Effort: Pulmonary effort is normal.   Abdominal:      General: Abdomen is flat. Bowel sounds are normal. There is no distension.      Tenderness: There is no abdominal tenderness. There is no right CVA tenderness, left CVA tenderness or guarding.   Musculoskeletal:         General: Normal range of motion.      Cervical back: Normal range of motion.   Skin:     General: Skin is warm " and dry.   Neurological:      Mental Status: She is alert and oriented to person, place, and time.   Psychiatric:         Behavior: Behavior normal.          PMH:  has a past medical history of Asthma (08/28/2018), Depression, and Pain (08/28/2018).  MEDS:   Current Outpatient Medications:   •  nitrofurantoin (MACROBID) 100 MG Cap, Take 1 capsule by mouth 2 times a day for 5 days., Disp: 10 capsule, Rfl: 0  •  QNASL 80 MCG/ACT Aero Soln, USE 1 SPRAY IN EACH NOSTRIL ONCE A DAY., Disp: 10.6 g, Rfl: 0  •  Magnesium 500 MG Cap, Take  by mouth., Disp: , Rfl:   •  NON SPECIFIED, , Disp: , Rfl:   •  albuterol 108 (90 Base) MCG/ACT Aero Soln inhalation aerosol, Inhale 2 Puffs by mouth every four hours as needed for Shortness of Breath., Disp: 6.7 g, Rfl: 3  •  fluticasone-salmeterol (ADVAIR) 500-50 MCG/DOSE AEROSOL POWDER, BREATH ACTIVATED, Inhale 1 Puff by mouth every 12 hours., Disp: 1 Each, Rfl: 11  •  montelukast (SINGULAIR) 10 MG Tab, Take 1 Tab by mouth every day., Disp: 90 Tab, Rfl: 2  •  doxycycline (VIBRAMYCIN) 100 MG Tab, Take 1 Tab by mouth 2 times a day., Disp: 20 Tab, Rfl: 3  •  citalopram (CELEXA) 20 MG Tab, Take 1 Tab by mouth every day., Disp: 90 Tab, Rfl: 3  •  rosuvastatin (CRESTOR) 10 MG Tab, Take 1 Tab by mouth every bedtime., Disp: 90 Tab, Rfl: 3  •  RESTASIS 0.05 % ophthalmic emulsion, , Disp: , Rfl:   •  estradiol (CLIMARA) 0.05 MG/24HR PATCH WEEKLY, , Disp: , Rfl:   •  progesterone (PROMETRIUM) 100 MG Cap, , Disp: , Rfl:   •  phentermine 30 MG capsule, Take 30 mg by mouth every morning., Disp: , Rfl:   •  Spacer/Aero Chamber Mouthpiece Misc, 1 Device by Does not apply route as needed., Disp: 1 Device, Rfl: 0  •  cetirizine (ZYRTEC) 10 MG TABS, Take 10 mg by mouth every day., Disp: , Rfl:   •  Spirulina 500 MG TABS, Take 1 Tab by mouth every evening., Disp: , Rfl:   •  vitamin D (CHOLECALCIFEROL) 1000 UNIT TABS, Take 2,000 Units by mouth every day., Disp: , Rfl:   •  Probiotic Product (PROBIOTIC DAILY  PO), Take 1 Tab by mouth every day., Disp: , Rfl:   •  Multiple Vitamins-Minerals (OCUVITE PO), Take 1 Tab by mouth every evening., Disp: , Rfl:   ALLERGIES:   Allergies   Allergen Reactions   • Miacalcin [Kdc:Calcitonin+Chlorobutanol]      Skin turns RED.   • Phenergan Vc With Codeine [Phenyleph-Promethazine-Cod]      Severe Dizziness   • Simvastatin      Muscle cramps   • Sulfa Drugs Hives     swelling     SURGHX:   Past Surgical History:   Procedure Laterality Date   • CERVICAL DISK AND FUSION ANTERIOR N/A 9/10/2018    Procedure: CERVICAL DISK AND FUSION ANTERIOR- C5-7 WITH PLATE;  Surgeon: Wallace Zamora M.D.;  Location: SURGERY [facility];  Service: Neurosurgery   • TUBAL COAGULATION LAPAROSCOPIC BILATERAL  1994     SOCHX:  reports that she quit smoking about 34 years ago. She has never used smokeless tobacco. She reports current alcohol use of about 1.0 oz of alcohol per week. She reports that she does not use drugs.  FH: family history includes Cancer in her father; Heart Disease in her brother.    POCT Urinalysis:  Ref Range & Units  5:27 PM    POC Color Negative red    POC Appearance Negative cloudy    POC Leukocyte Esterase Negative negative    POC Nitrites Negative negative    POC Urobiligen Negative (0.2) mg/dL 0.2    POC Protein Negative mg/dL trace    POC Urine PH 5.0 - 8.0 6.5    POC Blood Negative large    POC Specific Gravity <1.005 - >1.030 1.025    POC Ketones Negative mg/dL negative    POC Bilirubin Negative mg/dL negative    POC Glucose Negative mg/dL negative           Assessment/Plan:        1. Acute cystitis with hematuria    - POCT Urinalysis --> + blood  - URINE CULTURE(NEW); Future  - nitrofurantoin (MACROBID) 100 MG Cap; Take 1 capsule by mouth 2 times a day for 5 days.  Dispense: 10 capsule; Refill: 0   - Complete full course of antibiotics as prescribed     - Pt educated on preventative measures for avoiding future UTIs  - Advised to increase fluid intake  - OTC Pyridium (Azo) for  symptomatic relief, advised that it will turn urine orange in color  - Pending urine culture  - ER precautions given regarding pyelonephritis including fevers greater than 101 and, vomiting and dehydration, increased back pain.

## 2021-02-26 ENCOUNTER — TELEPHONE (OUTPATIENT)
Dept: URGENT CARE | Facility: CLINIC | Age: 56
End: 2021-02-26

## 2021-02-26 LAB
BACTERIA UR CULT: NORMAL
SIGNIFICANT IND 70042: NORMAL
SITE SITE: NORMAL
SOURCE SOURCE: NORMAL

## 2021-02-27 NOTE — TELEPHONE ENCOUNTER
Spoke with patient and informed her of negative urine culture results. Will consider renal imaging if hematuria is persistent/worsening. She states understanding and appreciates the phone call.

## 2021-03-05 DIAGNOSIS — J01.00 ACUTE NON-RECURRENT MAXILLARY SINUSITIS: ICD-10-CM

## 2021-03-05 DIAGNOSIS — L71.9 ACNE ROSACEA: ICD-10-CM

## 2021-03-05 RX ORDER — DOXYCYCLINE HYCLATE 100 MG
100 TABLET ORAL 2 TIMES DAILY
Qty: 20 TABLET | Refills: 3 | OUTPATIENT
Start: 2021-03-05

## 2021-03-19 ENCOUNTER — OFFICE VISIT (OUTPATIENT)
Dept: MEDICAL GROUP | Facility: IMAGING CENTER | Age: 56
End: 2021-03-19
Payer: COMMERCIAL

## 2021-03-19 VITALS
OXYGEN SATURATION: 97 % | DIASTOLIC BLOOD PRESSURE: 90 MMHG | BODY MASS INDEX: 29.09 KG/M2 | HEART RATE: 73 BPM | HEIGHT: 66 IN | WEIGHT: 181 LBS | SYSTOLIC BLOOD PRESSURE: 128 MMHG | RESPIRATION RATE: 16 BRPM | TEMPERATURE: 98.8 F

## 2021-03-19 DIAGNOSIS — Z13.1 SCREENING FOR DIABETES MELLITUS: ICD-10-CM

## 2021-03-19 DIAGNOSIS — E55.9 VITAMIN D DEFICIENCY: ICD-10-CM

## 2021-03-19 DIAGNOSIS — R31.0 FRANK HEMATURIA: ICD-10-CM

## 2021-03-19 DIAGNOSIS — Z78.0 MENOPAUSE: ICD-10-CM

## 2021-03-19 DIAGNOSIS — Z13.0 SCREENING FOR DEFICIENCY ANEMIA: ICD-10-CM

## 2021-03-19 DIAGNOSIS — Z11.59 ENCOUNTER FOR HEPATITIS C SCREENING TEST FOR LOW RISK PATIENT: ICD-10-CM

## 2021-03-19 DIAGNOSIS — Z13.220 SCREENING FOR CHOLESTEROL LEVEL: ICD-10-CM

## 2021-03-19 LAB
APPEARANCE UR: CLEAR
BILIRUB UR STRIP-MCNC: NEGATIVE MG/DL
COLOR UR AUTO: YELLOW
GLUCOSE UR STRIP.AUTO-MCNC: NEGATIVE MG/DL
KETONES UR STRIP.AUTO-MCNC: NEGATIVE MG/DL
LEUKOCYTE ESTERASE UR QL STRIP.AUTO: NEGATIVE
NITRITE UR QL STRIP.AUTO: NEGATIVE
PH UR STRIP.AUTO: 7 [PH] (ref 5–8)
PROT UR QL STRIP: NEGATIVE MG/DL
RBC UR QL AUTO: NEGATIVE
SP GR UR STRIP.AUTO: 1.01
UROBILINOGEN UR STRIP-MCNC: 0.2 MG/DL

## 2021-03-19 PROCEDURE — 99214 OFFICE O/P EST MOD 30 MIN: CPT | Performed by: FAMILY MEDICINE

## 2021-03-19 PROCEDURE — 81002 URINALYSIS NONAUTO W/O SCOPE: CPT | Performed by: FAMILY MEDICINE

## 2021-03-19 RX ORDER — ESTRADIOL 0.04 MG/D
1 PATCH TRANSDERMAL
Qty: 12 PATCH | Refills: 3 | Status: SHIPPED | OUTPATIENT
Start: 2021-03-19

## 2021-03-19 ASSESSMENT — ENCOUNTER SYMPTOMS
DIZZINESS: 0
NAUSEA: 0
COUGH: 0
DIARRHEA: 0
WHEEZING: 0
SHORTNESS OF BREATH: 0
BLURRED VISION: 0
FEVER: 0
HEADACHES: 0
PALPITATIONS: 0
CHILLS: 0
EYE PAIN: 0
ABDOMINAL PAIN: 0
MYALGIAS: 0
VOMITING: 0
POLYDIPSIA: 0
DOUBLE VISION: 0
WEAKNESS: 0

## 2021-03-19 ASSESSMENT — PATIENT HEALTH QUESTIONNAIRE - PHQ9: CLINICAL INTERPRETATION OF PHQ2 SCORE: 0

## 2021-03-19 ASSESSMENT — PAIN SCALES - GENERAL: PAINLEVEL: 4=SLIGHT-MODERATE PAIN

## 2021-03-19 NOTE — PROGRESS NOTES
Chief Complaint   Patient presents with   • Follow-Up     blood in urine at urgent care   • Requesting Labs     HPI:  55-year-old female with a history of menopausal syndrome, hyperlipidemia, asthma, decreased hearing in both ears, here for follow-up from urgent care after having blood in her urine.  She was treated with Macrobid twice daily 5 days completed 2-3 days.  The urine turned clear. Then turned red again now urine has been clear again. Culture came back negative. Her urine is normal. Not eating beets. No personal history of stone. Her mother did have stones. No flank pain. Some pain on back right bottom of ribs. No fevers, chills, n/v. During the passing of her mother she did vomit a couple of times attributed to emotional stress. Her mother passed away 3/17/21. Last red urine one week ago.     Last period June 29, 2019. Has been on hormones 2 or more years.   Night sweats have completely resolved. Would like to start reducing medication load. Interested in coming off of hormone therapy.   Allergies   Allergen Reactions   • Miacalcin [Kdc:Calcitonin+Chlorobutanol]      Skin turns RED.   • Phenergan Vc With Codeine [Phenyleph-Promethazine-Cod]      Severe Dizziness   • Simvastatin      Muscle cramps   • Sulfa Drugs Hives     swelling     Current Outpatient Medications   Medication Sig Dispense Refill   • estradiol (CLIMARA) 0.0375 MG/24HR patch Place 1 Patch on the skin every 7 days. 12 Patch 3   • QNASL 80 MCG/ACT Aero Soln USE 1 SPRAY IN EACH NOSTRIL ONCE A DAY. 10.6 g 0   • Magnesium 500 MG Cap Take  by mouth.     • albuterol 108 (90 Base) MCG/ACT Aero Soln inhalation aerosol Inhale 2 Puffs by mouth every four hours as needed for Shortness of Breath. 6.7 g 3   • fluticasone-salmeterol (ADVAIR) 500-50 MCG/DOSE AEROSOL POWDER, BREATH ACTIVATED Inhale 1 Puff by mouth every 12 hours. 1 Each 11   • montelukast (SINGULAIR) 10 MG Tab Take 1 Tab by mouth every day. 90 Tab 2   • doxycycline (VIBRAMYCIN) 100 MG  "Tab Take 1 Tab by mouth 2 times a day. 20 Tab 3   • citalopram (CELEXA) 20 MG Tab Take 1 Tab by mouth every day. 90 Tab 3   • rosuvastatin (CRESTOR) 10 MG Tab Take 1 Tab by mouth every bedtime. 90 Tab 3   • RESTASIS 0.05 % ophthalmic emulsion      • progesterone (PROMETRIUM) 100 MG Cap      • Spacer/Aero Chamber Mouthpiece Misc 1 Device by Does not apply route as needed. 1 Device 0   • cetirizine (ZYRTEC) 10 MG TABS Take 10 mg by mouth every day.     • Spirulina 500 MG TABS Take 1 Tab by mouth every evening.     • vitamin D (CHOLECALCIFEROL) 1000 UNIT TABS Take 2,000 Units by mouth every day.     • Multiple Vitamins-Minerals (OCUVITE PO) Take 1 Tab by mouth every evening.     • phentermine 30 MG capsule Take 30 mg by mouth every morning.     • Probiotic Product (PROBIOTIC DAILY PO) Take 1 Tab by mouth every day.       No current facility-administered medications for this visit.     Social History     Tobacco Use   • Smoking status: Former Smoker     Quit date: 1987     Years since quittin.2   • Smokeless tobacco: Never Used   Substance Use Topics   • Alcohol use: Yes     Alcohol/week: 1.0 oz     Types: 2 Glasses of wine per week     Comment: 1-5 per week   • Drug use: No     Family History   Problem Relation Age of Onset   • Cancer Father    • Heart Disease Brother        /90 (BP Location: Right arm, Patient Position: Sitting, BP Cuff Size: Adult)   Pulse 73   Temp 37.1 °C (98.8 °F) (Temporal)   Resp 16   Ht 1.676 m (5' 6\")   Wt 82.1 kg (181 lb)   SpO2 97%  Body mass index is 29.21 kg/m².  Review of Systems   Constitutional: Negative for chills and fever.   HENT: Negative for hearing loss and tinnitus.    Eyes: Negative for blurred vision, double vision and pain.   Respiratory: Negative for cough, shortness of breath and wheezing.    Cardiovascular: Negative for chest pain, palpitations and leg swelling.   Gastrointestinal: Negative for abdominal pain, diarrhea, nausea and vomiting. "   Genitourinary: Negative for dysuria and frequency.   Musculoskeletal: Negative for joint pain and myalgias.   Skin: Negative for itching and rash.   Neurological: Negative for dizziness, weakness and headaches.   Endo/Heme/Allergies: Negative for polydipsia.     Physical Exam   Constitutional: She is well-developed, well-nourished, and in no distress. No distress.   HENT:   Head: Normocephalic and atraumatic.   Eyes: Pupils are equal, round, and reactive to light. Conjunctivae and EOM are normal. Right eye exhibits no discharge. Left eye exhibits no discharge. No scleral icterus.   Neck: No thyromegaly present.   Pulmonary/Chest: Effort normal. No respiratory distress.   Abdominal: She exhibits no distension and no mass. There is no abdominal tenderness. There is no rebound and no guarding.   No CVA tenderness bilaterally.  Some tenderness at the right base of rib.    Musculoskeletal:         General: No edema.   Neurological: She is alert.   Skin: Skin is warm and dry. She is not diaphoretic.   Psychiatric: Affect and judgment normal.         All labs (last 1 month):   Hospital Outpatient Visit on 02/23/2021   Component Date Value Ref Range Status   • Significant Indicator 02/23/2021 NEG   Final   • Source 02/23/2021 UR   Final   • Site 02/23/2021 -   Final   • Culture Result 02/23/2021 Usual skin mena 10-50,000 cfu/mL   Final   Office Visit on 02/23/2021   Component Date Value Ref Range Status   • POC Color 02/23/2021 red  Negative Final   • POC Appearance 02/23/2021 cloudy  Negative Final   • POC Leukocyte Esterase 02/23/2021 negative  Negative Final   • POC Nitrites 02/23/2021 negative  Negative Final   • POC Urobiligen 02/23/2021 0.2  Negative (0.2) mg/dL Final   • POC Protein 02/23/2021 trace  Negative mg/dL Final   • POC Urine PH 02/23/2021 6.5  5.0 - 8.0 Final   • POC Blood 02/23/2021 large  Negative Final   • POC Specific Gravity 02/23/2021 1.025  <1.005 - >1.030 Final   • POC Ketones 02/23/2021 negative   Negative mg/dL Final   • POC Bilirubin 02/23/2021 negative  Negative mg/dL Final   • POC Glucose 02/23/2021 negative  Negative mg/dL Final       Lipids:   Lab Results   Component Value Date/Time    CHOLSTRLTOT 172 04/15/2016 07:13 AM    TRIGLYCERIDE 215 (H) 04/15/2016 07:13 AM    HDL 48 04/15/2016 07:13 AM    LDL 81 04/15/2016 07:13 AM       Imaging: No results found.    A/P  Return for annual bring forms for work improvement program  Decreasing estradiol from 0.5-0.0375.   Patient is to take vitamin D for macular degeneration  Return for annual.    Problem List Items Addressed This Visit     Menopause    Relevant Medications    estradiol (CLIMARA) 0.0375 MG/24HR patch    Vitamin D deficiency    Relevant Orders    VITAMIN D,25 HYDROXY      Other Visit Diagnoses     Screening for deficiency anemia        Relevant Orders    CBC WITH DIFFERENTIAL    Screening for diabetes mellitus        Relevant Orders    Comp Metabolic Panel    HEMOGLOBIN A1C    Screening for cholesterol level        Relevant Orders    Lipid Profile    Edward hematuria        Relevant Orders    MICROALBUMIN CREAT RATIO URINE    ESTIMATED GFR    POCT Urinalysis (Completed)          Portions of this note may be dictated using Dragon NaturallySpea[employer]ic [employer] voice recognition software.  Variances in spelling and vocabulary are possible and unintentional.  Not all areas may be caught/corrected.  Please notify me if any discrepancies are noted or if the meaning of any statement is not correct/clear.

## 2021-04-15 ENCOUNTER — HOSPITAL ENCOUNTER (OUTPATIENT)
Dept: LAB | Facility: MEDICAL CENTER | Age: 56
End: 2021-04-15
Attending: FAMILY MEDICINE
Payer: COMMERCIAL

## 2021-04-15 DIAGNOSIS — Z11.59 ENCOUNTER FOR HEPATITIS C SCREENING TEST FOR LOW RISK PATIENT: ICD-10-CM

## 2021-04-15 DIAGNOSIS — R31.0 FRANK HEMATURIA: ICD-10-CM

## 2021-04-15 DIAGNOSIS — Z13.1 SCREENING FOR DIABETES MELLITUS: ICD-10-CM

## 2021-04-15 DIAGNOSIS — E55.9 VITAMIN D DEFICIENCY: ICD-10-CM

## 2021-04-15 DIAGNOSIS — Z13.0 SCREENING FOR DEFICIENCY ANEMIA: ICD-10-CM

## 2021-04-15 LAB
BASOPHILS # BLD AUTO: 0.7 % (ref 0–1.8)
BASOPHILS # BLD: 0.04 K/UL (ref 0–0.12)
CREAT UR-MCNC: 32.15 MG/DL
EOSINOPHIL # BLD AUTO: 0.48 K/UL (ref 0–0.51)
EOSINOPHIL NFR BLD: 8.8 % (ref 0–6.9)
ERYTHROCYTE [DISTWIDTH] IN BLOOD BY AUTOMATED COUNT: 41.3 FL (ref 35.9–50)
EST. AVERAGE GLUCOSE BLD GHB EST-MCNC: 108 MG/DL
HBA1C MFR BLD: 5.4 % (ref 4–5.6)
HCT VFR BLD AUTO: 43.3 % (ref 37–47)
HCV AB SER QL: NORMAL
HGB BLD-MCNC: 14.1 G/DL (ref 12–16)
IMM GRANULOCYTES # BLD AUTO: 0.02 K/UL (ref 0–0.11)
IMM GRANULOCYTES NFR BLD AUTO: 0.4 % (ref 0–0.9)
LYMPHOCYTES # BLD AUTO: 1.59 K/UL (ref 1–4.8)
LYMPHOCYTES NFR BLD: 29.2 % (ref 22–41)
MCH RBC QN AUTO: 30.2 PG (ref 27–33)
MCHC RBC AUTO-ENTMCNC: 32.6 G/DL (ref 33.6–35)
MCV RBC AUTO: 92.7 FL (ref 81.4–97.8)
MICROALBUMIN UR-MCNC: <1.2 MG/DL
MICROALBUMIN/CREAT UR: NORMAL MG/G (ref 0–30)
MONOCYTES # BLD AUTO: 0.51 K/UL (ref 0–0.85)
MONOCYTES NFR BLD AUTO: 9.4 % (ref 0–13.4)
NEUTROPHILS # BLD AUTO: 2.81 K/UL (ref 2–7.15)
NEUTROPHILS NFR BLD: 51.5 % (ref 44–72)
NRBC # BLD AUTO: 0 K/UL
NRBC BLD-RTO: 0 /100 WBC
PLATELET # BLD AUTO: 216 K/UL (ref 164–446)
PMV BLD AUTO: 9.2 FL (ref 9–12.9)
RBC # BLD AUTO: 4.67 M/UL (ref 4.2–5.4)
WBC # BLD AUTO: 5.5 K/UL (ref 4.8–10.8)

## 2021-04-15 PROCEDURE — 82043 UR ALBUMIN QUANTITATIVE: CPT

## 2021-04-15 PROCEDURE — 82570 ASSAY OF URINE CREATININE: CPT

## 2021-04-15 PROCEDURE — 85025 COMPLETE CBC W/AUTO DIFF WBC: CPT

## 2021-04-15 PROCEDURE — 36415 COLL VENOUS BLD VENIPUNCTURE: CPT

## 2021-04-15 PROCEDURE — 83036 HEMOGLOBIN GLYCOSYLATED A1C: CPT

## 2021-04-15 PROCEDURE — 82306 VITAMIN D 25 HYDROXY: CPT

## 2021-04-15 PROCEDURE — 86803 HEPATITIS C AB TEST: CPT

## 2021-04-17 LAB — 25(OH)D3 SERPL-MCNC: 34 NG/ML (ref 30–80)

## 2021-05-21 DIAGNOSIS — F32.89 OTHER DEPRESSION: ICD-10-CM

## 2021-05-21 NOTE — TELEPHONE ENCOUNTER
----- Message from Kaley Dean sent at 5/20/2021  4:32 PM PDT -----  Regarding: Prescription Question  Contact: [phone]  I am out of citalopram 20 MG Tabs can you please send over a refill to [facility]?  It's not letting me refill through the regular refill under medications.    Thank you,    Kaley  [phone]

## 2021-05-24 RX ORDER — CITALOPRAM 20 MG/1
20 TABLET ORAL DAILY
Qty: 90 TABLET | Refills: 0 | Status: SHIPPED | OUTPATIENT
Start: 2021-05-24 | End: 2021-07-27

## 2021-07-27 ENCOUNTER — OFFICE VISIT (OUTPATIENT)
Dept: MEDICAL GROUP | Facility: IMAGING CENTER | Age: 56
End: 2021-07-27
Payer: COMMERCIAL

## 2021-07-27 VITALS
BODY MASS INDEX: 29.57 KG/M2 | TEMPERATURE: 98.7 F | WEIGHT: 184 LBS | OXYGEN SATURATION: 98 % | HEIGHT: 66 IN | SYSTOLIC BLOOD PRESSURE: 110 MMHG | DIASTOLIC BLOOD PRESSURE: 64 MMHG | HEART RATE: 74 BPM

## 2021-07-27 DIAGNOSIS — M79.641 PAIN OF RIGHT HAND: ICD-10-CM

## 2021-07-27 DIAGNOSIS — E78.2 MIXED HYPERLIPIDEMIA: ICD-10-CM

## 2021-07-27 PROCEDURE — 99213 OFFICE O/P EST LOW 20 MIN: CPT | Performed by: FAMILY MEDICINE

## 2021-07-27 RX ORDER — PROGESTERONE 100 MG/1
CAPSULE ORAL
COMMUNITY
Start: 2021-06-28

## 2021-07-27 RX ORDER — TRAMADOL HYDROCHLORIDE 50 MG/1
TABLET ORAL
COMMUNITY
Start: 2021-05-20 | End: 2022-11-10

## 2021-07-27 ASSESSMENT — PAIN SCALES - GENERAL: PAINLEVEL: 6=MODERATE PAIN

## 2021-07-27 NOTE — PROGRESS NOTES
Chief Complaint   Patient presents with   • Bump     lump on hands neurology and lump on collarbone     HPI:  55-year-old female with history of rosacea, menopausal symptoms, hyperlipidemia here with a couple concerns.   Here with lump on her collar bone three weeks ago noticed it while blow drying her hair. Lump also noticed three weeks ago on her right hand.  No fevers chills or night sweats.  No unexplained weight loss.  Allergies   Allergen Reactions   • Miacalcin [Kdc:Calcitonin+Chlorobutanol]      Skin turns RED.   • Phenergan Vc With Codeine [Phenyleph-Promethazine-Cod]      Severe Dizziness   • Simvastatin      Muscle cramps   • Sulfa Drugs Hives     swelling     Current Outpatient Medications   Medication Sig Dispense Refill   • citalopram (CELEXA) 20 MG Tab Take 1 tablet by mouth every day. 90 tablet 0   • estradiol (CLIMARA) 0.0375 MG/24HR patch Place 1 Patch on the skin every 7 days. 12 Patch 3   • QNASL 80 MCG/ACT Aero Soln USE 1 SPRAY IN EACH NOSTRIL ONCE A DAY. 10.6 g 0   • Magnesium 500 MG Cap Take  by mouth.     • albuterol 108 (90 Base) MCG/ACT Aero Soln inhalation aerosol Inhale 2 Puffs by mouth every four hours as needed for Shortness of Breath. 6.7 g 3   • fluticasone-salmeterol (ADVAIR) 500-50 MCG/DOSE AEROSOL POWDER, BREATH ACTIVATED Inhale 1 Puff by mouth every 12 hours. 1 Each 11   • montelukast (SINGULAIR) 10 MG Tab Take 1 Tab by mouth every day. 90 Tab 2   • doxycycline (VIBRAMYCIN) 100 MG Tab Take 1 Tab by mouth 2 times a day. 20 Tab 3   • rosuvastatin (CRESTOR) 10 MG Tab Take 1 Tab by mouth every bedtime. 90 Tab 3   • RESTASIS 0.05 % ophthalmic emulsion      • progesterone (PROMETRIUM) 100 MG Cap      • Spacer/Aero Chamber Mouthpiece Misc 1 Device by Does not apply route as needed. 1 Device 0   • cetirizine (ZYRTEC) 10 MG TABS Take 10 mg by mouth every day.     • Spirulina 500 MG TABS Take 1 Tab by mouth every evening.     • vitamin D (CHOLECALCIFEROL) 1000 UNIT TABS Take 2,000 Units by  "mouth every day.     • Multiple Vitamins-Minerals (OCUVITE PO) Take 1 Tab by mouth every evening.     • progesterone (PROMETRIUM) 100 MG Cap      • traMADol (ULTRAM) 50 MG Tab        No current facility-administered medications for this visit.     Social History     Tobacco Use   • Smoking status: Former Smoker     Quit date: 1987     Years since quittin.5   • Smokeless tobacco: Never Used   Vaping Use   • Vaping Use: Never used   Substance Use Topics   • Alcohol use: Yes     Alcohol/week: 1.0 oz     Types: 2 Glasses of wine per week     Comment: 1-5 per week   • Drug use: No     Family History   Problem Relation Age of Onset   • Cancer Father    • Heart Disease Brother        /64 (BP Location: Left arm, Patient Position: Sitting, BP Cuff Size: Adult)   Pulse 74   Temp 37.1 °C (98.7 °F) (Temporal)   Ht 1.676 m (5' 6\")   Wt 83.5 kg (184 lb)   SpO2 98%  Body mass index is 29.7 kg/m².  Review of Systems   Constitutional: Negative for chills, fever and malaise/fatigue.   HENT: Negative for hearing loss and tinnitus.    Eyes: Negative for double vision and pain.   Respiratory: Negative for cough, shortness of breath and wheezing.    Cardiovascular: Negative for chest pain, palpitations and leg swelling.   Gastrointestinal: Negative for abdominal pain, diarrhea, nausea and vomiting.   Genitourinary: Negative for dysuria and frequency.   Musculoskeletal: Negative for joint pain and myalgias.   Skin: Negative for itching and rash.   Neurological: Negative for dizziness and headaches.     Physical Exam  Constitutional:       General: He is not in acute distress.     Appearance: He is not diaphoretic.   HENT:      Head: Normocephalic and atraumatic.   Eyes:      General: No scleral icterus.        Right eye: No discharge.         Left eye: No discharge.      Conjunctiva/sclera: Conjunctivae normal.      Pupils: Pupils are equal, round, and reactive to light.   Neck:      Thyroid: No thyromegaly. "   Pulmonary:      Effort: Pulmonary effort is normal. No respiratory distress.   Abdominal:      General: There is no distension.   Musculoskeletal: right hand with hypertrophy of flexor pollicis brevis on the right with very small nodules within that muscle.  Nodules are not associated with the bone.  They are tender.  And mobile.  Another nodule noted the medial aspect of the right clavicle.  Mobile and cystic in nature 0.25 cm.  No supraclavicular adenopathy.  Skin:     General: Skin is warm and dry.   Neurological:      Mental Status: He is alert.   Psychiatric:         Mood and Affect: Affect normal.         Judgment: Judgment normal.         All labs (last 1 month):   No visits with results within 1 Month(s) from this visit.   Latest known visit with results is:   Hospital Outpatient Visit on 04/15/2021   Component Date Value Ref Range Status   • 25-Hydroxy   Vitamin D 25 04/15/2021 34  30 - 80 ng/mL Final    Comment: INTERPRETIVE INFORMATION: Vitamin D, 25-Hydroxy  This assay accurately quantifies the sum of vitamin D3, 25-hydroxy  and vitamin D2, 25-hydroxy.  0-17 years:  Deficiency: less than 20 ng/mL  Optimum level: greater than or equal to 20 ng/mL*  *(Hameed CL et al. Pediatrics 2008; 122: 1142-52.)  18 years and older:  Deficiency: Less than 20 ng/mL  Insufficiency: 20-29 ng/mL  Optimum Level: 30-80 ng/mL  Possible Toxicity: Greater than 150 ng/mL  Performed By: [facility]  [address] [place] [id number]  : Antonieta Ibanez MD     • Creatinine, Urine 04/15/2021 32.15  mg/dL Final   • Microalbumin, Urine Random 04/15/2021 <1.2  mg/dL Final   • Micro Alb Creat Ratio 04/15/2021 see below  0 - 30 mg/g Final    Comment: Unable to calculate the microalbumin/creatinine ratio due to  the microalbumin result or the urine creatinine result being  outside the measurement range of the analyzer.     • Hepatitis C Antibody 04/15/2021 Non-Reactive  Non-Reactive Final    Comment:  Antibodies to HCV were not detected.  The Roche anti-HCV is a diagnostic test for the qualitative determination of  antibodies to the hepatitis C virus in human serum and plasma. The results  should be used and interpreted only in the context of the overall clinical  picture. A negative test result does not exclude the possibility of exposure  to hepatitis C virus.  Note: Assay performance characteristics have not been established in  populations of immunocompromised or immunosuppressed patients.     • WBC 04/15/2021 5.5  4.8 - 10.8 K/uL Final   • RBC 04/15/2021 4.67  4.20 - 5.40 M/uL Final   • Hemoglobin 04/15/2021 14.1  12.0 - 16.0 g/dL Final   • Hematocrit 04/15/2021 43.3  37.0 - 47.0 % Final   • MCV 04/15/2021 92.7  81.4 - 97.8 fL Final   • MCH 04/15/2021 30.2  27.0 - 33.0 pg Final   • MCHC 04/15/2021 32.6* 33.6 - 35.0 g/dL Final   • RDW 04/15/2021 41.3  35.9 - 50.0 fL Final   • Platelet Count 04/15/2021 216  164 - 446 K/uL Final   • MPV 04/15/2021 9.2  9.0 - 12.9 fL Final   • Neutrophils-Polys 04/15/2021 51.50  44.00 - 72.00 % Final   • Lymphocytes 04/15/2021 29.20  22.00 - 41.00 % Final   • Monocytes 04/15/2021 9.40  0.00 - 13.40 % Final   • Eosinophils 04/15/2021 8.80* 0.00 - 6.90 % Final   • Basophils 04/15/2021 0.70  0.00 - 1.80 % Final   • Immature Granulocytes 04/15/2021 0.40  0.00 - 0.90 % Final   • Nucleated RBC 04/15/2021 0.00  /100 WBC Final   • Neutrophils (Absolute) 04/15/2021 2.81  2.00 - 7.15 K/uL Final    Includes immature neutrophils, if present.   • Lymphs (Absolute) 04/15/2021 1.59  1.00 - 4.80 K/uL Final   • Monos (Absolute) 04/15/2021 0.51  0.00 - 0.85 K/uL Final   • Eos (Absolute) 04/15/2021 0.48  0.00 - 0.51 K/uL Final   • Baso (Absolute) 04/15/2021 0.04  0.00 - 0.12 K/uL Final   • Immature Granulocytes (abs) 04/15/2021 0.02  0.00 - 0.11 K/uL Final   • NRBC (Absolute) 04/15/2021 0.00  K/uL Final   • Glycohemoglobin 04/15/2021 5.4  4.0 - 5.6 % Final    Comment: Increased risk for diabetes:   5.7 -6.4%  Diabetes:  >6.4%  Glycemic control for adults with diabetes:  <7.0%  The above interpretations are per ADA guidelines.  Diagnosis  of diabetes mellitus on the basis of elevated Hemoglobin A1c  should be confirmed by repeating the Hb A1c test.     • Est Avg Glucose 04/15/2021 108  mg/dL Final    Comment: The eAG calculation is based on the A1c-Derived Daily Glucose  (ADAG) study.  See the ADA's website for additional information.         Lipids:   Lab Results   Component Value Date/Time    CHOLSTRLTOT 172 04/15/2016 07:13 AM    TRIGLYCERIDE 215 (H) 04/15/2016 07:13 AM    HDL 48 04/15/2016 07:13 AM    LDL 81 04/15/2016 07:13 AM       Imaging: No results found.    A/P  Cyst on hand with excessive typing and on the collarbone consistent with ganglion cyst.  Reassurance provided.  Modifiy typing, consider dictation.   Return if symptoms worsen or fail to improve.    Problem List Items Addressed This Visit     Mixed hyperlipidemia    Relevant Orders    Lipid Profile      Other Visit Diagnoses     Pain of right hand        Relevant Orders    DX-HAND 3+ RIGHT    REFERRAL TO ORTHOPEDICS          Portions of this note may be dictated using Dragon NaturallySpeaking voice recognition software.  Variances in spelling and vocabulary are possible and unintentional.  Not all areas may be caught/corrected.  Please notify me if any discrepancies are noted or if the meaning of any statement is not correct/clear.

## 2021-08-23 DIAGNOSIS — F32.89 OTHER DEPRESSION: ICD-10-CM

## 2021-08-24 RX ORDER — CITALOPRAM 20 MG/1
TABLET ORAL
Qty: 90 TABLET | Refills: 0 | Status: SHIPPED | OUTPATIENT
Start: 2021-08-24 | End: 2022-04-25 | Stop reason: SDUPTHER

## 2021-08-25 ENCOUNTER — APPOINTMENT (OUTPATIENT)
Dept: MEDICAL GROUP | Facility: IMAGING CENTER | Age: 56
End: 2021-08-25

## 2021-08-26 ENCOUNTER — APPOINTMENT (OUTPATIENT)
Dept: MEDICAL GROUP | Facility: IMAGING CENTER | Age: 56
End: 2021-08-26

## 2021-08-27 DIAGNOSIS — J01.00 ACUTE NON-RECURRENT MAXILLARY SINUSITIS: ICD-10-CM

## 2021-08-27 RX ORDER — BECLOMETHASONE DIPROPIONATE 80 UG/1
1 AEROSOL, METERED NASAL DAILY
Qty: 10.6 G | Refills: 0 | Status: SHIPPED | OUTPATIENT
Start: 2021-08-27 | End: 2022-02-10 | Stop reason: SDUPTHER

## 2021-08-31 ENCOUNTER — APPOINTMENT (OUTPATIENT)
Dept: MEDICAL GROUP | Facility: IMAGING CENTER | Age: 56
End: 2021-08-31

## 2021-09-13 ENCOUNTER — APPOINTMENT (OUTPATIENT)
Dept: MEDICAL GROUP | Facility: IMAGING CENTER | Age: 56
End: 2021-09-13

## 2021-09-16 ENCOUNTER — APPOINTMENT (OUTPATIENT)
Dept: MEDICAL GROUP | Facility: IMAGING CENTER | Age: 56
End: 2021-09-16

## 2021-10-01 DIAGNOSIS — J45.40 MODERATE PERSISTENT ASTHMA WITHOUT COMPLICATION: ICD-10-CM

## 2021-10-18 PROBLEM — M18.12 PRIMARY OSTEOARTHRITIS OF FIRST CARPOMETACARPAL JOINT OF LEFT HAND: Status: ACTIVE | Noted: 2021-10-18

## 2021-10-18 PROBLEM — M18.11 PRIMARY OSTEOARTHRITIS OF FIRST CARPOMETACARPAL JOINT OF RIGHT HAND: Status: ACTIVE | Noted: 2021-10-18

## 2021-11-17 ENCOUNTER — HOSPITAL ENCOUNTER (OUTPATIENT)
Facility: MEDICAL CENTER | Age: 56
End: 2021-11-17
Attending: STUDENT IN AN ORGANIZED HEALTH CARE EDUCATION/TRAINING PROGRAM
Payer: COMMERCIAL

## 2021-11-17 ENCOUNTER — OFFICE VISIT (OUTPATIENT)
Dept: URGENT CARE | Facility: PHYSICIAN GROUP | Age: 56
End: 2021-11-17
Payer: COMMERCIAL

## 2021-11-17 VITALS
HEIGHT: 66 IN | HEART RATE: 80 BPM | OXYGEN SATURATION: 98 % | SYSTOLIC BLOOD PRESSURE: 122 MMHG | BODY MASS INDEX: 29.57 KG/M2 | DIASTOLIC BLOOD PRESSURE: 78 MMHG | TEMPERATURE: 97.8 F | WEIGHT: 184 LBS | RESPIRATION RATE: 16 BRPM

## 2021-11-17 DIAGNOSIS — B37.9 ANTIBIOTIC-INDUCED YEAST INFECTION: ICD-10-CM

## 2021-11-17 DIAGNOSIS — T36.95XA ANTIBIOTIC-INDUCED YEAST INFECTION: ICD-10-CM

## 2021-11-17 DIAGNOSIS — J01.40 ACUTE NON-RECURRENT PANSINUSITIS: ICD-10-CM

## 2021-11-17 DIAGNOSIS — J45.21 MILD INTERMITTENT ASTHMA WITH EXACERBATION: ICD-10-CM

## 2021-11-17 DIAGNOSIS — Z20.822 COVID-19 RULED OUT: ICD-10-CM

## 2021-11-17 PROCEDURE — 99214 OFFICE O/P EST MOD 30 MIN: CPT | Mod: CS | Performed by: STUDENT IN AN ORGANIZED HEALTH CARE EDUCATION/TRAINING PROGRAM

## 2021-11-17 PROCEDURE — U0005 INFEC AGEN DETEC AMPLI PROBE: HCPCS

## 2021-11-17 PROCEDURE — U0003 INFECTIOUS AGENT DETECTION BY NUCLEIC ACID (DNA OR RNA); SEVERE ACUTE RESPIRATORY SYNDROME CORONAVIRUS 2 (SARS-COV-2) (CORONAVIRUS DISEASE [COVID-19]), AMPLIFIED PROBE TECHNIQUE, MAKING USE OF HIGH THROUGHPUT TECHNOLOGIES AS DESCRIBED BY CMS-2020-01-R: HCPCS

## 2021-11-17 RX ORDER — PREDNISONE 20 MG/1
40 TABLET ORAL DAILY
Qty: 10 TABLET | Refills: 0 | Status: SHIPPED | OUTPATIENT
Start: 2021-11-17 | End: 2021-11-22

## 2021-11-17 RX ORDER — AMOXICILLIN AND CLAVULANATE POTASSIUM 875; 125 MG/1; MG/1
1 TABLET, FILM COATED ORAL 2 TIMES DAILY
Qty: 10 TABLET | Refills: 0 | Status: SHIPPED | OUTPATIENT
Start: 2021-11-17 | End: 2021-11-22

## 2021-11-17 RX ORDER — FLUCONAZOLE 150 MG/1
TABLET ORAL
Qty: 2 TABLET | Refills: 0 | Status: SHIPPED
Start: 2021-11-17 | End: 2022-11-10

## 2021-11-18 DIAGNOSIS — Z20.822 COVID-19 RULED OUT: ICD-10-CM

## 2021-11-18 DIAGNOSIS — J01.40 ACUTE NON-RECURRENT PANSINUSITIS: ICD-10-CM

## 2021-11-18 LAB
COVID ORDER STATUS COVID19: NORMAL
SARS-COV-2 RNA RESP QL NAA+PROBE: NOTDETECTED
SPECIMEN SOURCE: NORMAL

## 2021-11-18 NOTE — PROGRESS NOTES
Subjective:   CHIEF COMPLAINT  Chief Complaint   Patient presents with   • Sinus Problem     Facial pain, teeth hurt, bloody green mucus ongoing 10 days or more.       HPI  Kaley Dean is a 56 y.o. female who presents with a chief complaint of sinus pain and pressure for 10 days.  Says there is been green nasal discharge from her sinuses.  Admits associated symptoms of cough.  Patient has a history of asthma but says she has only used her albuterol 2 times this week.  Admits associated symptoms of shortness of breath but no wheezing.  She is vaccinated against Covid.    REVIEW OF SYSTEMS  General: no fever or chills  GI: no nausea or vomiting  See HPI for further details.     PAST MEDICAL HISTORY  Patient Active Problem List    Diagnosis Date Noted   • Primary osteoarthritis of first carpometacarpal joint of right hand 10/18/2021   • Primary osteoarthritis of first carpometacarpal joint of left hand 10/18/2021   • Needs flu shot 11/12/2019   • Vitamin D deficiency 11/12/2019   • Acute non-recurrent maxillary sinusitis 11/12/2019   • Exposure to hepatitis C 11/12/2019   • Personal history of colonic polyps 09/26/2016   • Acne rosacea 04/24/2015   • Bilateral dry eyes 01/16/2015   • Menopause 01/13/2015   • Hemorrhoids 01/13/2015   • Allergic rhinitis 01/13/2015   • Mixed hyperlipidemia 01/13/2015   • Asthma 01/13/2015   • Decreased hearing of both ears 01/13/2015   • Encounter for routine gynecological examination 01/13/2015       SURGICAL HISTORY   has a past surgical history that includes tubal coagulation laparoscopic bilateral (1994) and cervical disk and fusion anterior (N/A, 9/10/2018).    ALLERGIES  Allergies   Allergen Reactions   • Miacalcin [Kdc:Calcitonin+Chlorobutanol]      Skin turns RED.   • Phenergan Vc With Codeine [Phenyleph-Promethazine-Cod]      Severe Dizziness   • Simvastatin      Muscle cramps   • Sulfa Drugs Hives     swelling       CURRENT MEDICATIONS  Home Medications     Reviewed by  "Shital Ayoub, Med Ass't (Medical Assistant) on 21 at 1800  Med List Status: <None>   Medication Last Dose Status   ADVAIR DISKUS 500-50 MCG/DOSE AEROSOL POWDER, BREATH ACTIVATED  Active   albuterol 108 (90 Base) MCG/ACT Aero Soln inhalation aerosol PRN Active   Beclomethasone Dipropionate (QNASL) 80 MCG/ACT Aero Soln  Active   cetirizine (ZYRTEC) 10 MG TABS Taking Active   citalopram (CELEXA) 20 MG Tab  Active   doxycycline (VIBRAMYCIN) 100 MG Tab Taking Active   estradiol (CLIMARA) 0.0375 MG/24HR patch Taking Active   Magnesium 500 MG Cap Taking Active   meloxicam (MOBIC) 15 MG tablet  Active   montelukast (SINGULAIR) 10 MG Tab  Active   Multiple Vitamins-Minerals (OCUVITE PO) Taking Active   progesterone (PROMETRIUM) 100 MG Cap Taking Active   progesterone (PROMETRIUM) 100 MG Cap  Active   RESTASIS 0.05 % ophthalmic emulsion Taking Active   rosuvastatin (CRESTOR) 10 MG Tab Taking Active   Spacer/Aero Chamber Mouthpiece Misc Taking Active   Spirulina 500 MG TABS Taking Active   traMADol (ULTRAM) 50 MG Tab  Active   vitamin D (CHOLECALCIFEROL) 1000 UNIT TABS Taking Active                SOCIAL HISTORY  Social History     Tobacco Use   • Smoking status: Former Smoker     Quit date: 1987     Years since quittin.9   • Smokeless tobacco: Never Used   Vaping Use   • Vaping Use: Never used   Substance and Sexual Activity   • Alcohol use: Yes     Alcohol/week: 1.0 oz     Types: 2 Glasses of wine per week     Comment: 1-5 per week   • Drug use: No   • Sexual activity: Yes     Partners: Male     Birth control/protection: Surgical       FAMILY HISTORY  Family History   Problem Relation Age of Onset   • Cancer Father    • Heart Disease Brother           Objective:   PHYSICAL EXAM  VITAL SIGNS: /78   Pulse 80   Temp 36.6 °C (97.8 °F) (Temporal)   Resp 16   Ht 1.676 m (5' 6\")   Wt 83.5 kg (184 lb)   LMP 2019 (Approximate)   SpO2 98%   BMI 29.70 kg/m²     Gen: no acute distress, normal " voice  Skin: dry, intact, moist mucosal membranes  Lungs: Diminished breath sounds but good air intake without any overt wheezing, rhonchi or crackles.  CV: RRR w/o murmurs or clicks  Psych: normal affect, normal judgement, alert, awake    Assessment/Plan:     1. Acute non-recurrent pansinusitis  COVID/SARS CoV-2 PCR    amoxicillin-clavulanate (AUGMENTIN) 875-125 MG Tab   2. Antibiotic-induced yeast infection  fluconazole (DIFLUCAN) 150 MG tablet   3. COVID-19 ruled out  COVID/SARS CoV-2 PCR   4. Mild intermittent asthma with exacerbation  predniSONE (DELTASONE) 20 MG Tab   Sinus pain and pressure for 10 days with failure of conservative treatment consistent with a bacterial sinus infection.  In addition the patient has been experiencing shortness of breath w/ cough and nightime sx with a history of asthma and likely experiencing acute asthma exacerbation due to above.  She is vaccinated against covid.  -Ordered prednisone 40 mg p.o. daily x5 days  -Ordered Augmentin  -Ordered Diflucan  -Ordered Covid.  Results will be sent through [facility].  -Instructed to quarantine until Covid results have been returned  -Encouraged hydration and symptomatic treatment with Tylenol/ibuprofen prn  -Discussed red flags and return precautions.  Patient verbalized their understanding.  All questions were answered.    Differential diagnosis, natural history, supportive care, and indications for immediate follow-up discussed. Patient agrees with the plan of care.    Follow-up as needed if symptoms worsen or fail to improve to PCP, Urgent care or Emergency Room.       Please note that this dictation was created using voice recognition software. I have made a reasonable attempt to correct obvious errors, but I expect that there are errors of grammar and possibly content that I did not discover before finalizing the note.

## 2021-11-21 RX ORDER — GABAPENTIN 100 MG/1
CAPSULE ORAL
COMMUNITY
Start: 2021-10-14 | End: 2022-11-10

## 2021-11-21 RX ORDER — ESTRADIOL 0.05 MG/D
PATCH TRANSDERMAL
COMMUNITY
Start: 2021-10-01 | End: 2022-11-10

## 2021-12-22 ENCOUNTER — PATIENT MESSAGE (OUTPATIENT)
Dept: MEDICAL GROUP | Facility: IMAGING CENTER | Age: 56
End: 2021-12-22

## 2021-12-22 DIAGNOSIS — E78.2 MIXED HYPERLIPIDEMIA: ICD-10-CM

## 2021-12-22 RX ORDER — ROSUVASTATIN CALCIUM 10 MG/1
10 TABLET, COATED ORAL
Qty: 90 TABLET | Refills: 2 | Status: SHIPPED | OUTPATIENT
Start: 2021-12-22 | End: 2022-12-30

## 2021-12-22 NOTE — TELEPHONE ENCOUNTER
From: Kaley Dean  To: Physician Valarie Augustine  Sent: 12/22/2021 6:46 AM PST  Subject: rosuvastatin 10 MG Tabs    I need this med refilled at [facility] pharmacy. It won't let me request a refill under medications.    Thank you and Kaley Echols

## 2022-02-10 DIAGNOSIS — J01.00 ACUTE NON-RECURRENT MAXILLARY SINUSITIS: ICD-10-CM

## 2022-02-10 DIAGNOSIS — J45.40 MODERATE PERSISTENT ASTHMA WITHOUT COMPLICATION: ICD-10-CM

## 2022-02-14 RX ORDER — BECLOMETHASONE DIPROPIONATE 80 UG/1
1 AEROSOL, METERED NASAL DAILY
Qty: 10.6 G | Refills: 0 | Status: SHIPPED | OUTPATIENT
Start: 2022-02-14 | End: 2022-08-12 | Stop reason: SDUPTHER

## 2022-02-14 RX ORDER — ALBUTEROL SULFATE 90 UG/1
2 AEROSOL, METERED RESPIRATORY (INHALATION) EVERY 4 HOURS PRN
Qty: 6.7 G | Refills: 3 | Status: SHIPPED | OUTPATIENT
Start: 2022-02-14 | End: 2023-05-10 | Stop reason: SDUPTHER

## 2022-03-31 DIAGNOSIS — J45.40 MODERATE PERSISTENT ASTHMA WITHOUT COMPLICATION: ICD-10-CM

## 2022-04-04 RX ORDER — MONTELUKAST SODIUM 10 MG/1
10 TABLET ORAL DAILY
Qty: 90 TABLET | Refills: 0 | Status: SHIPPED | OUTPATIENT
Start: 2022-04-04 | End: 2022-11-11 | Stop reason: SDUPTHER

## 2022-04-13 PROBLEM — M65.321 TRIGGER INDEX FINGER OF RIGHT HAND: Status: ACTIVE | Noted: 2022-04-13

## 2022-04-13 PROBLEM — M65.341 TRIGGER RING FINGER OF RIGHT HAND: Status: ACTIVE | Noted: 2022-04-13

## 2022-04-25 DIAGNOSIS — F32.89 OTHER DEPRESSION: ICD-10-CM

## 2022-04-28 RX ORDER — CITALOPRAM 20 MG/1
20 TABLET ORAL DAILY
Qty: 90 TABLET | Refills: 0 | Status: SHIPPED | OUTPATIENT
Start: 2022-04-28 | End: 2023-02-21 | Stop reason: SDUPTHER

## 2022-05-04 ENCOUNTER — OFFICE VISIT (OUTPATIENT)
Dept: MEDICAL GROUP | Facility: IMAGING CENTER | Age: 57
End: 2022-05-04
Payer: COMMERCIAL

## 2022-05-04 VITALS
DIASTOLIC BLOOD PRESSURE: 78 MMHG | OXYGEN SATURATION: 97 % | HEIGHT: 67 IN | SYSTOLIC BLOOD PRESSURE: 130 MMHG | BODY MASS INDEX: 29.76 KG/M2 | RESPIRATION RATE: 14 BRPM | WEIGHT: 189.6 LBS | HEART RATE: 67 BPM | TEMPERATURE: 97.7 F

## 2022-05-04 DIAGNOSIS — R53.83 FATIGUE, UNSPECIFIED TYPE: ICD-10-CM

## 2022-05-04 DIAGNOSIS — E55.9 VITAMIN D DEFICIENCY: ICD-10-CM

## 2022-05-04 DIAGNOSIS — L70.9 ADULT ACNE: ICD-10-CM

## 2022-05-04 DIAGNOSIS — Z13.1 SCREENING FOR DIABETES MELLITUS (DM): ICD-10-CM

## 2022-05-04 DIAGNOSIS — E78.2 MIXED HYPERLIPIDEMIA: ICD-10-CM

## 2022-05-04 DIAGNOSIS — M25.542 ARTHRALGIA OF BOTH HANDS: ICD-10-CM

## 2022-05-04 DIAGNOSIS — M25.541 ARTHRALGIA OF BOTH HANDS: ICD-10-CM

## 2022-05-04 DIAGNOSIS — R25.2 MUSCLE CRAMPS: ICD-10-CM

## 2022-05-04 DIAGNOSIS — Z13.0 SCREENING FOR DEFICIENCY ANEMIA: ICD-10-CM

## 2022-05-04 PROCEDURE — 99214 OFFICE O/P EST MOD 30 MIN: CPT | Performed by: FAMILY MEDICINE

## 2022-05-04 RX ORDER — VALACYCLOVIR HYDROCHLORIDE 500 MG/1
TABLET, FILM COATED ORAL
COMMUNITY
Start: 2022-03-11 | End: 2023-02-07

## 2022-05-04 RX ORDER — TRETINOIN 1 MG/G
CREAM TOPICAL
Qty: 45 G | Refills: 3 | Status: SHIPPED | OUTPATIENT
Start: 2022-05-04 | End: 2024-02-14

## 2022-05-04 RX ORDER — CLINDAMYCIN AND BENZOYL PEROXIDE 10; 50 MG/G; MG/G
1 GEL TOPICAL 2 TIMES DAILY
Qty: 50 G | Refills: 3 | Status: SHIPPED | OUTPATIENT
Start: 2022-05-04 | End: 2024-02-14

## 2022-05-04 RX ORDER — AZELASTINE HYDROCHLORIDE 137 UG/1
SPRAY, METERED NASAL
COMMUNITY
Start: 2022-02-28 | End: 2022-05-04

## 2022-05-04 ASSESSMENT — ANXIETY QUESTIONNAIRES
6. BECOMING EASILY ANNOYED OR IRRITABLE: SEVERAL DAYS
2. NOT BEING ABLE TO STOP OR CONTROL WORRYING: SEVERAL DAYS
5. BEING SO RESTLESS THAT IT IS HARD TO SIT STILL: SEVERAL DAYS
7. FEELING AFRAID AS IF SOMETHING AWFUL MIGHT HAPPEN: SEVERAL DAYS
GAD7 TOTAL SCORE: 10
4. TROUBLE RELAXING: MORE THAN HALF THE DAYS
3. WORRYING TOO MUCH ABOUT DIFFERENT THINGS: MORE THAN HALF THE DAYS
1. FEELING NERVOUS, ANXIOUS, OR ON EDGE: MORE THAN HALF THE DAYS

## 2022-05-04 ASSESSMENT — PATIENT HEALTH QUESTIONNAIRE - PHQ9
5. POOR APPETITE OR OVEREATING: 1 - SEVERAL DAYS
SUM OF ALL RESPONSES TO PHQ QUESTIONS 1-9: 7
CLINICAL INTERPRETATION OF PHQ2 SCORE: 2

## 2022-05-04 NOTE — PROGRESS NOTES
Chief Complaint   Patient presents with   • Requesting Labs   • Medication Management   • Arthritis     In hands      HPI:   With a history of   Patient Active Problem List   Diagnosis   • Menopause   • Hemorrhoids   • Allergic rhinitis   • Mixed hyperlipidemia   • Asthma   • Decreased hearing of both ears   • Encounter for routine gynecological examination   • Bilateral dry eyes   • Acne rosacea   • Personal history of colonic polyps   • Needs flu shot   • Vitamin D deficiency   • Acute non-recurrent maxillary sinusitis   • Exposure to hepatitis C   • Primary osteoarthritis of first carpometacarpal joint of right hand   • Primary osteoarthritis of first carpometacarpal joint of left hand   • Trigger index finger of right hand   • Trigger ring finger of right hand   here for follow up  Gross hematuria resolved: yes  Has not used tramadol for some time. Dennis diagnosed with arthritis in hands. meloxicam she had blood in stool. couple weeks ago felt ill and bp and says it was high. 156/86ish per patient.  She has been taking Celebrex.  Took acutane in her 30s, has doxycline for adult acne.  Has not been taking her doxycycline and has been breaking out.  Still getting cramps in hands and feet.  Statin therapy.  Allergies   Allergen Reactions   • Miacalcin [Kdc:Calcitonin+Chlorobutanol]      Skin turns RED.   • Phenergan Vc With Codeine [Phenyleph-Promethazine-Cod]      Severe Dizziness   • Simvastatin      Muscle cramps   • Sulfa Drugs Hives     swelling     Current Outpatient Medications   Medication Sig Dispense Refill   • valACYclovir (VALTREX) 500 MG Tab TAKE ONE TABLET BY MOUTH TWICE DAILY FOR TEN DAYS.     • tretinoin (RETIN-A) 0.1 % cream Apply topically to affected area at night. 45 g 3   • clindamycin-benzoyl peroxide (BENZACLIN) gel Apply 1 Application topically 2 times a day. 50 g 3   • citalopram (CELEXA) 20 MG Tab Take 1 Tablet by mouth every day. 90 Tablet 0   • celecoxib (CELEBREX) 200 MG Cap Take 1  Capsule by mouth every day. 30 Capsule 0   • montelukast (SINGULAIR) 10 MG Tab Take 1 Tablet by mouth every day. 90 Tablet 0   • Beclomethasone Dipropionate (QNASL) 80 MCG/ACT Aero Soln Administer 1 Spray into affected nostril(S) every day. 10.6 g 0   • albuterol 108 (90 Base) MCG/ACT Aero Soln inhalation aerosol Inhale 2 Puffs every four hours as needed for Shortness of Breath. 6.7 g 3   • rosuvastatin (CRESTOR) 10 MG Tab Take 1 Tablet by mouth at bedtime. 90 Tablet 2   • estradiol (CLIMARA) 0.05 MG/24HR PATCH WEEKLY APPLY 1 PATCH TO SKIN ONCE A WEEK.     • ADVAIR DISKUS 500-50 MCG/DOSE AEROSOL POWDER, BREATH ACTIVATED INHALE 1 PUFF EVERY 12 HOURS. 1 Each 11   • progesterone (PROMETRIUM) 100 MG Cap      • estradiol (CLIMARA) 0.0375 MG/24HR patch Place 1 Patch on the skin every 7 days. 12 Patch 3   • Magnesium 500 MG Cap Take  by mouth.     • RESTASIS 0.05 % ophthalmic emulsion      • progesterone (PROMETRIUM) 100 MG Cap      • Spacer/Aero Chamber Mouthpiece Misc 1 Device by Does not apply route as needed. 1 Device 0   • cetirizine (ZYRTEC) 10 MG TABS Take 10 mg by mouth every day.     • vitamin D (CHOLECALCIFEROL) 1000 UNIT TABS Take 2,000 Units by mouth every day.     • Multiple Vitamins-Minerals (OCUVITE PO) Take 1 Tab by mouth every evening.     • gabapentin (NEURONTIN) 100 MG Cap TAKE ONE CAPSULE BY MOUTH AT BEDTIME. (Patient not taking: Reported on 2022)     • fluconazole (DIFLUCAN) 150 MG tablet Take one tab today. Take 2nd tab in 3 days if still symptomatic (Patient not taking: Reported on 2022) 2 Tablet 0   • traMADol (ULTRAM) 50 MG Tab  (Patient not taking: Reported on 2022)     • Spirulina 500 MG TABS Take 1 Tab by mouth every evening. (Patient not taking: Reported on 2022)       No current facility-administered medications for this visit.     Social History     Tobacco Use   • Smoking status: Former Smoker     Quit date: 1987     Years since quittin.3   • Smokeless tobacco: Never  "Used   Vaping Use   • Vaping Use: Never used   Substance Use Topics   • Alcohol use: Yes     Alcohol/week: 1.0 oz     Types: 2 Glasses of wine per week     Comment: 1-5 per week   • Drug use: No     Family History   Problem Relation Age of Onset   • Cancer Father    • Heart Disease Brother        /78   Pulse 67   Temp 36.5 °C (97.7 °F)   Resp 14   Ht 1.702 m (5' 7\")   Wt 86 kg (189 lb 9.6 oz)   SpO2 97%  Body mass index is 29.7 kg/m².  Review of Systems   Constitutional: Negative for chills, fever and malaise/fatigue.   HENT: Negative for hearing loss and tinnitus.    Eyes: Negative for double vision and pain.   Respiratory: Negative for cough, shortness of breath and wheezing.    Cardiovascular: Negative for chest pain, palpitations and leg swelling.   Gastrointestinal: Negative for abdominal pain, diarrhea, nausea and vomiting.   Genitourinary: Negative for dysuria and frequency.   Musculoskeletal: Negative for joint pain and myalgias.   Skin: Negative for itching and rash.   Neurological: Negative for dizziness and headaches.     Physical Exam  Constitutional:       General: He is not in acute distress.     Appearance: He is not diaphoretic.   HENT:      Head: Normocephalic and atraumatic.   Eyes:      General: No scleral icterus.        Right eye: No discharge.         Left eye: No discharge.      Conjunctiva/sclera: Conjunctivae normal.      Pupils: Pupils are equal, round, and reactive to light.   Neck:      Thyroid: No thyromegaly.   Pulmonary:      Effort: Pulmonary effort is normal. No respiratory distress.   Abdominal:      General: There is no distension.   Skin:     General: Skin is warm and dry.   Neurological:      Mental Status: He is alert.   Psychiatric:         Mood and Affect: Affect normal.         Judgment: Judgment normal.         All labs (last 1 month):   No visits with results within 1 Month(s) from this visit.   Latest known visit with results is:   Hospital Outpatient Visit on " "11/17/2021   Component Date Value Ref Range Status   • COVID Order Status 11/17/2021 Received   Final    Comment: The order for SARS CoV-2 testing has been received by the  Laboratory. This result is neither positive nor negative.  Final results of testing will report separately.     • SARS-CoV-2 Source 11/17/2021 Nasal Swab   Final   • SARS-CoV-2 by PCR 11/17/2021 NotDetected   Final    Comment: PATIENTS: Important information regarding your results and instructions can  be found at [email or web address]   \"After your  Covid-19 Test\"    [facility] providers: PLEASE REFER TO DE-ESCALATION AND RETESTING PROTOCOL  on [email or web address]    **The Roche SARS-CoV-2 RT-PCR test has been made available for use under the  Emergency Use Authorization (EUA) only.         Lipids:   Lab Results   Component Value Date/Time    CHOLSTRLTOT 172 04/15/2016 07:13 AM    TRIGLYCERIDE 215 (H) 04/15/2016 07:13 AM    HDL 48 04/15/2016 07:13 AM    LDL 81 04/15/2016 07:13 AM       Imaging: No results found.    A/P  Space out statin therapy to every other day.  Continue with cramping continues  Apply topical NSAIDs 4 times per day    Return for annual.    Problem List Items Addressed This Visit     Mixed hyperlipidemia    Relevant Orders    Lipid Profile    Vitamin D deficiency    Relevant Orders    VITAMIN D,25 HYDROXY      Other Visit Diagnoses     Muscle cramps        Relevant Orders    CREATINE KINASE    Adult acne        Relevant Medications    tretinoin (RETIN-A) 0.1 % cream    clindamycin-benzoyl peroxide (BENZACLIN) gel    Fatigue, unspecified type        Relevant Orders    TSH WITH REFLEX TO FT4    Screening for diabetes mellitus (DM)        Relevant Orders    Comp Metabolic Panel    HEMOGLOBIN A1C    Screening for deficiency anemia        Relevant Orders    CBC WITH DIFFERENTIAL    Arthralgia of both hands        Relevant Orders    HOMOCYSTEINE    RHEUMATOID ARTHRITIS PANEL    Sed Rate          Portions of this " note may be dictated using Dragon [employer] voice recognition software.  Variances in spelling and vocabulary are possible and unintentional.  Not all areas may be caught/corrected.  Please notify me if any discrepancies are noted or if the meaning of any statement is not correct/clear.

## 2022-05-26 ENCOUNTER — APPOINTMENT (RX ONLY)
Dept: URBAN - METROPOLITAN AREA CLINIC 4 | Facility: CLINIC | Age: 57
Setting detail: DERMATOLOGY
End: 2022-05-26

## 2022-05-26 DIAGNOSIS — L81.4 OTHER MELANIN HYPERPIGMENTATION: ICD-10-CM

## 2022-05-26 DIAGNOSIS — D18.0 HEMANGIOMA: ICD-10-CM

## 2022-05-26 DIAGNOSIS — L70.0 ACNE VULGARIS: ICD-10-CM

## 2022-05-26 DIAGNOSIS — D22 MELANOCYTIC NEVI: ICD-10-CM | Status: STABLE

## 2022-05-26 DIAGNOSIS — L82.1 OTHER SEBORRHEIC KERATOSIS: ICD-10-CM

## 2022-05-26 PROBLEM — D22.5 MELANOCYTIC NEVI OF TRUNK: Status: ACTIVE | Noted: 2022-05-26

## 2022-05-26 PROBLEM — D22.39 MELANOCYTIC NEVI OF OTHER PARTS OF FACE: Status: ACTIVE | Noted: 2022-05-26

## 2022-05-26 PROBLEM — D18.01 HEMANGIOMA OF SKIN AND SUBCUTANEOUS TISSUE: Status: ACTIVE | Noted: 2022-05-26

## 2022-05-26 PROBLEM — L30.9 DERMATITIS, UNSPECIFIED: Status: ACTIVE | Noted: 2022-05-26

## 2022-05-26 PROCEDURE — ? COUNSELING

## 2022-05-26 PROCEDURE — ? OBSERVATION AND MEASURE

## 2022-05-26 PROCEDURE — ? PATIENT SPECIFIC COUNSELING

## 2022-05-26 PROCEDURE — 99214 OFFICE O/P EST MOD 30 MIN: CPT

## 2022-05-26 PROCEDURE — ? PRESCRIPTION

## 2022-05-26 PROCEDURE — ? MEDICATION COUNSELING

## 2022-05-26 RX ORDER — DOXYCYCLINE HYCLATE 100 MG/1
CAPSULE, GELATIN COATED ORAL BID
Qty: 60 | Refills: 3 | Status: ERX | COMMUNITY
Start: 2022-05-26

## 2022-05-26 RX ORDER — TRIAMCINOLONE ACETONIDE 1 MG/G
CREAM TOPICAL
Qty: 80 | Refills: 2 | Status: ERX | COMMUNITY
Start: 2022-05-26

## 2022-05-26 RX ADMIN — DOXYCYCLINE HYCLATE: 100 CAPSULE, GELATIN COATED ORAL at 00:00

## 2022-05-26 RX ADMIN — TRIAMCINOLONE ACETONIDE: 1 CREAM TOPICAL at 00:00

## 2022-05-26 ASSESSMENT — LOCATION ZONE DERM
LOCATION ZONE: HAND
LOCATION ZONE: TRUNK
LOCATION ZONE: LEG
LOCATION ZONE: FACE

## 2022-05-26 ASSESSMENT — LOCATION DETAILED DESCRIPTION DERM
LOCATION DETAILED: RIGHT ANTERIOR PROXIMAL THIGH
LOCATION DETAILED: LEFT PROXIMAL PRETIBIAL REGION
LOCATION DETAILED: LEFT BUTTOCK
LOCATION DETAILED: RIGHT RADIAL DORSAL HAND
LOCATION DETAILED: LEFT ANTERIOR PROXIMAL THIGH
LOCATION DETAILED: LEFT INFERIOR MEDIAL MALAR CHEEK
LOCATION DETAILED: RIGHT PROXIMAL PRETIBIAL REGION
LOCATION DETAILED: RIGHT INFERIOR CENTRAL MALAR CHEEK
LOCATION DETAILED: INFERIOR THORACIC SPINE
LOCATION DETAILED: RIGHT BUTTOCK

## 2022-05-26 ASSESSMENT — LOCATION SIMPLE DESCRIPTION DERM
LOCATION SIMPLE: RIGHT THIGH
LOCATION SIMPLE: LEFT PRETIBIAL REGION
LOCATION SIMPLE: RIGHT CHEEK
LOCATION SIMPLE: RIGHT PRETIBIAL REGION
LOCATION SIMPLE: UPPER BACK
LOCATION SIMPLE: LEFT BUTTOCK
LOCATION SIMPLE: RIGHT HAND
LOCATION SIMPLE: LEFT CHEEK
LOCATION SIMPLE: RIGHT BUTTOCK
LOCATION SIMPLE: LEFT THIGH

## 2022-05-26 NOTE — PROCEDURE: COUNSELING
Detail Level: Detailed
Detail Level: Generalized
Detail Level: Zone
Tazorac Pregnancy And Lactation Text: This medication is not safe during pregnancy. It is unknown if this medication is excreted in breast milk.
Azithromycin Pregnancy And Lactation Text: This medication is considered safe during pregnancy and is also secreted in breast milk.
High Dose Vitamin A Pregnancy And Lactation Text: High dose vitamin A therapy is contraindicated during pregnancy and breast feeding.
Aklief counseling:  Patient advised to apply a pea-sized amount only at bedtime and wait 30 minutes after washing their face before applying.  If too drying, patient may add a non-comedogenic moisturizer.  The most commonly reported side effects including irritation, redness, scaling, dryness, stinging, burning, itching, and increased risk of sunburn.  The patient verbalized understanding of the proper use and possible adverse effects of retinoids.  All of the patient's questions and concerns were addressed.
Winlevi Counseling:  I discussed with the patient the risks of topical clascoterone including but not limited to erythema, scaling, itching, and stinging. Patient voiced their understanding.
Dapsone Counseling: I discussed with the patient the risks of dapsone including but not limited to hemolytic anemia, agranulocytosis, rashes, methemoglobinemia, kidney failure, peripheral neuropathy, headaches, GI upset, and liver toxicity.  Patients who start dapsone require monitoring including baseline LFTs and weekly CBCs for the first month, then every month thereafter.  The patient verbalized understanding of the proper use and possible adverse effects of dapsone.  All of the patient's questions and concerns were addressed.
Spironolactone Counseling: Patient advised regarding risks of diarrhea, abdominal pain, hyperkalemia, birth defects (for female patients), liver toxicity and renal toxicity. The patient may need blood work to monitor liver and kidney function and potassium levels while on therapy. The patient verbalized understanding of the proper use and possible adverse effects of spironolactone.  All of the patient's questions and concerns were addressed.
Benzoyl Peroxide Pregnancy And Lactation Text: This medication is Pregnancy Category C. It is unknown if benzoyl peroxide is excreted in breast milk.
Bactrim Counseling:  I discussed with the patient the risks of sulfa antibiotics including but not limited to GI upset, allergic reaction, drug rash, diarrhea, dizziness, photosensitivity, and yeast infections.  Rarely, more serious reactions can occur including but not limited to aplastic anemia, agranulocytosis, methemoglobinemia, blood dyscrasias, liver or kidney failure, lung infiltrates or desquamative/blistering drug rashes.
Erythromycin Pregnancy And Lactation Text: This medication is Pregnancy Category B and is considered safe during pregnancy. It is also excreted in breast milk.
Aklief Pregnancy And Lactation Text: It is unknown if this medication is safe to use during pregnancy.  It is unknown if this medication is excreted in breast milk.  Breastfeeding women should use the topical cream on the smallest area of the skin for the shortest time needed while breastfeeding.  Do not apply to nipple and areola.
Dapsone Pregnancy And Lactation Text: This medication is Pregnancy Category C and is not considered safe during pregnancy or breast feeding.
Topical Clindamycin Counseling: Patient counseled that this medication may cause skin irritation or allergic reactions.  In the event of skin irritation, the patient was advised to reduce the amount of the drug applied or use it less frequently.   The patient verbalized understanding of the proper use and possible adverse effects of clindamycin.  All of the patient's questions and concerns were addressed.
Spironolactone Pregnancy And Lactation Text: This medication can cause feminization of the male fetus and should be avoided during pregnancy. The active metabolite is also found in breast milk.
Minocycline Counseling: Patient advised regarding possible photosensitivity and discoloration of the teeth, skin, lips, tongue and gums.  Patient instructed to avoid sunlight, if possible.  When exposed to sunlight, patients should wear protective clothing, sunglasses, and sunscreen.  The patient was instructed to call the office immediately if the following severe adverse effects occur:  hearing changes, easy bruising/bleeding, severe headache, or vision changes.  The patient verbalized understanding of the proper use and possible adverse effects of minocycline.  All of the patient's questions and concerns were addressed.
Topical Retinoid counseling:  Patient advised to apply a pea-sized amount only at bedtime and wait 30 minutes after washing their face before applying.  If too drying, patient may add a non-comedogenic moisturizer. The patient verbalized understanding of the proper use and possible adverse effects of retinoids.  All of the patient's questions and concerns were addressed.
Isotretinoin Counseling: Patient should get monthly blood tests, not donate blood, not drive at night if vision affected, not share medication, and not undergo elective surgery for 6 months after tx completed. Side effects reviewed, pt to contact office should one occur.
Winlevi Pregnancy And Lactation Text: This medication is considered safe during pregnancy and breastfeeding.
Topical Clindamycin Pregnancy And Lactation Text: This medication is Pregnancy Category B and is considered safe during pregnancy. It is unknown if it is excreted in breast milk.
Bactrim Pregnancy And Lactation Text: This medication is Pregnancy Category D and is known to cause fetal risk.  It is also excreted in breast milk.
Minocycline Pregnancy And Lactation Text: This medication is Pregnancy Category D and not consider safe during pregnancy. It is also excreted in breast milk.
Include Pregnancy/Lactation Warning?: No
Azelaic Acid Counseling: Patient counseled that medicine may cause skin irritation and to avoid applying near the eyes.  In the event of skin irritation, the patient was advised to reduce the amount of the drug applied or use it less frequently.   The patient verbalized understanding of the proper use and possible adverse effects of azelaic acid.  All of the patient's questions and concerns were addressed.
Isotretinoin Pregnancy And Lactation Text: This medication is Pregnancy Category X and is considered extremely dangerous during pregnancy. It is unknown if it is excreted in breast milk.
Doxycycline Counseling:  Patient counseled regarding possible photosensitivity and increased risk for sunburn.  Patient instructed to avoid sunlight, if possible.  When exposed to sunlight, patients should wear protective clothing, sunglasses, and sunscreen.  The patient was instructed to call the office immediately if the following severe adverse effects occur:  hearing changes, easy bruising/bleeding, severe headache, or vision changes.  The patient verbalized understanding of the proper use and possible adverse effects of doxycycline.  All of the patient's questions and concerns were addressed.
Tetracycline Counseling: Patient counseled regarding possible photosensitivity and increased risk for sunburn.  Patient instructed to avoid sunlight, if possible.  When exposed to sunlight, patients should wear protective clothing, sunglasses, and sunscreen.  The patient was instructed to call the office immediately if the following severe adverse effects occur:  hearing changes, easy bruising/bleeding, severe headache, or vision changes.  The patient verbalized understanding of the proper use and possible adverse effects of tetracycline.  All of the patient's questions and concerns were addressed. Patient understands to avoid pregnancy while on therapy due to potential birth defects.
Topical Sulfur Applications Counseling: Topical Sulfur Counseling: Patient counseled that this medication may cause skin irritation or allergic reactions.  In the event of skin irritation, the patient was advised to reduce the amount of the drug applied or use it less frequently.   The patient verbalized understanding of the proper use and possible adverse effects of topical sulfur application.  All of the patient's questions and concerns were addressed.
Birth Control Pills Counseling: Birth Control Pill Counseling: I discussed with the patient the potential side effects of OCPs including but not limited to increased risk of stroke, heart attack, thrombophlebitis, deep venous thrombosis, hepatic adenomas, breast changes, GI upset, headaches, and depression.  The patient verbalized understanding of the proper use and possible adverse effects of OCPs. All of the patient's questions and concerns were addressed.
Topical Retinoid Pregnancy And Lactation Text: This medication is Pregnancy Category C. It is unknown if this medication is excreted in breast milk.
Azelaic Acid Pregnancy And Lactation Text: This medication is considered safe during pregnancy and breast feeding.
Doxycycline Pregnancy And Lactation Text: This medication is Pregnancy Category D and not consider safe during pregnancy. It is also excreted in breast milk but is considered safe for shorter treatment courses.
Sarecycline Counseling: Patient advised regarding possible photosensitivity and discoloration of the teeth, skin, lips, tongue and gums.  Patient instructed to avoid sunlight, if possible.  When exposed to sunlight, patients should wear protective clothing, sunglasses, and sunscreen.  The patient was instructed to call the office immediately if the following severe adverse effects occur:  hearing changes, easy bruising/bleeding, severe headache, or vision changes.  The patient verbalized understanding of the proper use and possible adverse effects of sarecycline.  All of the patient's questions and concerns were addressed.
Birth Control Pills Pregnancy And Lactation Text: This medication should be avoided if pregnant and for the first 30 days post-partum.
Tazorac Counseling:  Patient advised that medication is irritating and drying.  Patient may need to apply sparingly and wash off after an hour before eventually leaving it on overnight.  The patient verbalized understanding of the proper use and possible adverse effects of tazorac.  All of the patient's questions and concerns were addressed.
Azithromycin Counseling:  I discussed with the patient the risks of azithromycin including but not limited to GI upset, allergic reaction, drug rash, diarrhea, and yeast infections.
High Dose Vitamin A Counseling: Side effects reviewed, pt to contact office should one occur.
Benzoyl Peroxide Counseling: Patient counseled that medicine may cause skin irritation and bleach clothing.  In the event of skin irritation, the patient was advised to reduce the amount of the drug applied or use it less frequently.   The patient verbalized understanding of the proper use and possible adverse effects of benzoyl peroxide.  All of the patient's questions and concerns were addressed.
Topical Sulfur Applications Pregnancy And Lactation Text: This medication is Pregnancy Category C and has an unknown safety profile during pregnancy. It is unknown if this topical medication is excreted in breast milk.
Erythromycin Counseling:  I discussed with the patient the risks of erythromycin including but not limited to GI upset, allergic reaction, drug rash, diarrhea, increase in liver enzymes, and yeast infections.

## 2022-05-26 NOTE — PROCEDURE: MEDICATION COUNSELING
Colchicine Pregnancy And Lactation Text: This medication is Pregnancy Category C and isn't considered safe during pregnancy. It is excreted in breast milk.
Itraconazole Pregnancy And Lactation Text: This medication is Pregnancy Category C and it isn't know if it is safe during pregnancy. It is also excreted in breast milk.
Niacinamide Pregnancy And Lactation Text: These medications are considered safe during pregnancy.
Opzelura Pregnancy And Lactation Text: There is insufficient data to evaluate drug-associated risk for major birth defects, miscarriage, or other adverse maternal or fetal outcomes.  There is a pregnancy registry that monitors pregnancy outcomes in pregnant persons exposed to the medication during pregnancy.  It is unknown if this medication is excreted in breast milk.  Do not breastfeed during treatment and for about 4 weeks after the last dose.
Topical Ketoconazole Pregnancy And Lactation Text: This medication is Pregnancy Category B and is considered safe during pregnancy. It is unknown if it is excreted in breast milk.
Tremfya Pregnancy And Lactation Text: The risk during pregnancy and breastfeeding is uncertain with this medication.
Metronidazole Counseling:  I discussed with the patient the risks of metronidazole including but not limited to seizures, nausea/vomiting, a metallic taste in the mouth, nausea/vomiting and severe allergy.
Opzelura Counseling:  I discussed with the patient the risks of Opzelura including but not limited to nasopharngitis, bronchitis, ear infection, eosinophila, hives, diarrhea, folliculitis, tonsillitis, and rhinorrhea.  Taken orally, this medication has been linked to serious infections; higher rate of mortality; malignancy and lymphoproliferative disorders; major adverse cardiovascular events; thrombosis; thrombocytopenia, anemia, and neutropenia; and lipid elevations.
Tremfya Counseling: I discussed with the patient the risks of guselkumab including but not limited to immunosuppression, serious infections, worsening of inflammatory bowel disease and drug reactions.  The patient understands that monitoring is required including a PPD at baseline and must alert us or the primary physician if symptoms of infection or other concerning signs are noted.
Prednisone Pregnancy And Lactation Text: This medication is Pregnancy Category C and it isn't know if it is safe during pregnancy. This medication is excreted in breast milk.
Sski Pregnancy And Lactation Text: This medication is Pregnancy Category D and isn't considered safe during pregnancy. It is excreted in breast milk.
5-Fu Counseling: 5-Fluorouracil Counseling:  I discussed with the patient the risks of 5-fluorouracil including but not limited to erythema, scaling, itching, weeping, crusting, and pain.
Infliximab Counseling:  I discussed with the patient the risks of infliximab including but not limited to myelosuppression, immunosuppression, autoimmune hepatitis, demyelinating diseases, lymphoma, and serious infections.  The patient understands that monitoring is required including a PPD at baseline and must alert us or the primary physician if symptoms of infection or other concerning signs are noted.
Xeljanz Counseling: I discussed with the patient the risks of Xeljanz therapy including increased risk of infection, liver issues, headache, diarrhea, or cold symptoms. Live vaccines should be avoided. They were instructed to call if they have any problems.
Nsaids Counseling: NSAID Counseling: I discussed with the patient that NSAIDs should be taken with food. Prolonged use of NSAIDs can result in the development of stomach ulcers.  Patient advised to stop taking NSAIDs if abdominal pain occurs.  The patient verbalized understanding of the proper use and possible adverse effects of NSAIDs.  All of the patient's questions and concerns were addressed.
Metronidazole Pregnancy And Lactation Text: This medication is Pregnancy Category B and considered safe during pregnancy.  It is also excreted in breast milk.
Picato Counseling:  I discussed with the patient the risks of Picato including but not limited to erythema, scaling, itching, weeping, crusting, and pain.
Topical Sulfur Applications Counseling: Topical Sulfur Counseling: Patient counseled that this medication may cause skin irritation or allergic reactions.  In the event of skin irritation, the patient was advised to reduce the amount of the drug applied or use it less frequently.   The patient verbalized understanding of the proper use and possible adverse effects of topical sulfur application.  All of the patient's questions and concerns were addressed.
Include Pregnancy/Lactation Warning?: No
Olumiant Counseling: I discussed with the patient the risks of Olumiant therapy including but not limited to upper respiratory tract infections, shingles, cold sores, and nausea. Live vaccines should be avoided.  This medication has been linked to serious infections; higher rate of mortality; malignancy and lymphoproliferative disorders; major adverse cardiovascular events; thrombosis; gastrointestinal perforations; neutropenia; lymphopenia; anemia; liver enzyme elevations; and lipid elevations.
Acitretin Counseling:  I discussed with the patient the risks of acitretin including but not limited to hair loss, dry lips/skin/eyes, liver damage, hyperlipidemia, depression/suicidal ideation, photosensitivity.  Serious rare side effects can include but are not limited to pancreatitis, pseudotumor cerebri, bony changes, clot formation/stroke/heart attack.  Patient understands that alcohol is contraindicated since it can result in liver toxicity and significantly prolong the elimination of the drug by many years.
Drysol Counseling:  I discussed with the patient the risks of drysol/aluminum chloride including but not limited to skin rash, itching, irritation, burning.
Infliximab Pregnancy And Lactation Text: This medication is Pregnancy Category B and is considered safe during pregnancy. It is unknown if this medication is excreted in breast milk.
Thalidomide Counseling: I discussed with the patient the risks of thalidomide including but not limited to birth defects, anxiety, weakness, chest pain, dizziness, cough and severe allergy.
5-Fu Pregnancy And Lactation Text: This medication is Pregnancy Category X and contraindicated in pregnancy and in women who may become pregnant. It is unknown if this medication is excreted in breast milk.
Ketoconazole Counseling:   Patient counseled regarding improving absorption with orange juice.  Adverse effects include but are not limited to breast enlargement, headache, diarrhea, nausea, upset stomach, liver function test abnormalities, taste disturbance, and stomach pain.  There is a rare possibility of liver failure that can occur when taking ketoconazole. The patient understands that monitoring of LFTs may be required, especially at baseline. The patient verbalized understanding of the proper use and possible adverse effects of ketoconazole.  All of the patient's questions and concerns were addressed.
Dapsone Counseling: I discussed with the patient the risks of dapsone including but not limited to hemolytic anemia, agranulocytosis, rashes, methemoglobinemia, kidney failure, peripheral neuropathy, headaches, GI upset, and liver toxicity.  Patients who start dapsone require monitoring including baseline LFTs and weekly CBCs for the first month, then every month thereafter.  The patient verbalized understanding of the proper use and possible adverse effects of dapsone.  All of the patient's questions and concerns were addressed.
Minocycline Counseling: Patient advised regarding possible photosensitivity and discoloration of the teeth, skin, lips, tongue and gums.  Patient instructed to avoid sunlight, if possible.  When exposed to sunlight, patients should wear protective clothing, sunglasses, and sunscreen.  The patient was instructed to call the office immediately if the following severe adverse effects occur:  hearing changes, easy bruising/bleeding, severe headache, or vision changes.  The patient verbalized understanding of the proper use and possible adverse effects of minocycline.  All of the patient's questions and concerns were addressed.
Topical Sulfur Applications Pregnancy And Lactation Text: This medication is Pregnancy Category C and has an unknown safety profile during pregnancy. It is unknown if this topical medication is excreted in breast milk.
Acitretin Pregnancy And Lactation Text: This medication is Pregnancy Category X and should not be given to women who are pregnant or may become pregnant in the future. This medication is excreted in breast milk.
Xelbiancaz Pregnancy And Lactation Text: This medication is Pregnancy Category D and is not considered safe during pregnancy.  The risk during breast feeding is also uncertain.
Drysol Pregnancy And Lactation Text: This medication is considered safe during pregnancy and breast feeding.
Tranexamic Acid Counseling:  Patient advised of the small risk of bleeding problems with tranexamic acid. They were also instructed to call if they developed any nausea, vomiting or diarrhea. All of the patient's questions and concerns were addressed.
Picato Pregnancy And Lactation Text: This medication is Pregnancy Category C. It is unknown if this medication is excreted in breast milk.
Olumiant Pregnancy And Lactation Text: Based on animal studies, Olumiant may cause embryo-fetal harm when administered to pregnant women.  The medication should not be used in pregnancy.  Breastfeeding is not recommended during treatment.
Thalidomide Pregnancy And Lactation Text: This medication is Pregnancy Category X and is absolutely contraindicated during pregnancy. It is unknown if it is excreted in breast milk.
Ketoconazole Pregnancy And Lactation Text: This medication is Pregnancy Category C and it isn't know if it is safe during pregnancy. It is also excreted in breast milk and breast feeding isn't recommended.
Nsaids Pregnancy And Lactation Text: These medications are considered safe up to 30 weeks gestation. It is excreted in breast milk.
Dapsone Pregnancy And Lactation Text: This medication is Pregnancy Category C and is not considered safe during pregnancy or breast feeding.
Adbry Counseling: I discussed with the patient the risks of tralokinumab including but not limited to eye infection and irritation, cold sores, injection site reactions, worsening of asthma, allergic reactions and increased risk of parasitic infection.  Live vaccines should be avoided while taking tralokinumab. The patient understands that monitoring is required and they must alert us or the primary physician if symptoms of infection or other concerning signs are noted.
Rinvoq Counseling: I discussed with the patient the risks of Rinvoq therapy including but not limited to upper respiratory tract infections, shingles, cold sores, bronchitis, nausea, cough, fever, acne, and headache. Live vaccines should be avoided.  This medication has been linked to serious infections; higher rate of mortality; malignancy and lymphoproliferative disorders; major adverse cardiovascular events; thrombosis; thrombocytopenia, anemia, and neutropenia; lipid elevations; liver enzyme elevations; and gastrointestinal perforations.
Xolair Counseling:  Patient informed of potential adverse effects including but not limited to fever, muscle aches, rash and allergic reactions.  The patient verbalized understanding of the proper use and possible adverse effects of Xolair.  All of the patient's questions and concerns were addressed.
Elidel Counseling: Patient may experience a mild burning sensation during topical application. Elidel is not approved in children less than 2 years of age. There have been case reports of hematologic and skin malignancies in patients using topical calcineurin inhibitors although causality is questionable.
Protopic Counseling: Patient may experience a mild burning sensation during topical application. Protopic is not approved in children less than 2 years of age. There have been case reports of hematologic and skin malignancies in patients using topical calcineurin inhibitors although causality is questionable.
Terbinafine Pregnancy And Lactation Text: This medication is Pregnancy Category B and is considered safe during pregnancy. It is also excreted in breast milk and breast feeding isn't recommended.
Cibinqo Counseling: I discussed with the patient the risks of Cibinqo therapy including but not limited to common cold, nausea, headache, cold sores, increased blood CPK levels, dizziness, UTIs, fatigue, acne, and vomitting. Live vaccines should be avoided.  This medication has been linked to serious infections; higher rate of mortality; malignancy and lymphoproliferative disorders; major adverse cardiovascular events; thrombosis; thrombocytopenia and lymphopenia; lipid elevations; and retinal detachment.
Adbry Pregnancy And Lactation Text: It is unknown if this medication will adversely affect pregnancy or breast feeding.
Dutasteride Male Counseling: Dustasteride Counseling:  I discussed with the patient the risks of use of dutasteride including but not limited to decreased libido, decreased ejaculate volume, and gynecomastia. Women who can become pregnant should not handle medication.  All of the patient's questions and concerns were addressed.
Terbinafine Counseling: Patient counseling regarding adverse effects of terbinafine including but not limited to headache, diarrhea, rash, upset stomach, liver function test abnormalities, itching, taste/smell disturbance, nausea, abdominal pain, and flatulence.  There is a rare possibility of liver failure that can occur when taking terbinafine.  The patient understands that a baseline LFT and kidney function test may be required. The patient verbalized understanding of the proper use and possible adverse effects of terbinafine.  All of the patient's questions and concerns were addressed.
Odomzo Counseling- I discussed with the patient the risks of Odomzo including but not limited to nausea, vomiting, diarrhea, constipation, weight loss, changes in the sense of taste, decreased appetite, muscle spasms, and hair loss.  The patient verbalized understanding of the proper use and possible adverse effects of Odomzo.  All of the patient's questions and concerns were addressed.
Minocycline Pregnancy And Lactation Text: This medication is Pregnancy Category D and not consider safe during pregnancy. It is also excreted in breast milk.
Bexarotene Counseling:  I discussed with the patient the risks of bexarotene including but not limited to hair loss, dry lips/skin/eyes, liver abnormalities, hyperlipidemia, pancreatitis, depression/suicidal ideation, photosensitivity, drug rash/allergic reactions, hypothyroidism, anemia, leukopenia, infection, cataracts, and teratogenicity.  Patient understands that they will need regular blood tests to check lipid profile, liver function tests, white blood cell count, thyroid function tests and pregnancy test if applicable.
Wartpeel Counseling:  I discussed with the patient the risks of Wartpeel including but not limited to erythema, scaling, itching, weeping, crusting, and pain.
Protopic Pregnancy And Lactation Text: This medication is Pregnancy Category C. It is unknown if this medication is excreted in breast milk when applied topically.
Xolair Pregnancy And Lactation Text: This medication is Pregnancy Category B and is considered safe during pregnancy. This medication is excreted in breast milk.
Oral Minoxidil Counseling- I discussed with the patient the risks of oral minoxidil including but not limited to shortness of breath, swelling of the feet or ankles, dizziness, lightheadedness, unwanted hair growth and allergic reaction.  The patient verbalized understanding of the proper use and possible adverse effects of oral minoxidil.  All of the patient's questions and concerns were addressed.
Cibinqo Pregnancy And Lactation Text: It is unknown if this medication will adversely affect pregnancy or breast feeding.  You should not take this medication if you are currently pregnant or planning a pregnancy or while breastfeeding.
Valtrex Counseling: I discussed with the patient the risks of valacyclovir including but not limited to kidney damage, nausea, vomiting and severe allergy.  The patient understands that if the infection seems to be worsening or is not improving, they are to call.
Tranexamic Acid Pregnancy And Lactation Text: It is unknown if this medication is safe during pregnancy or breast feeding.
Bexarotene Pregnancy And Lactation Text: This medication is Pregnancy Category X and should not be given to women who are pregnant or may become pregnant. This medication should not be used if you are breast feeding.
Quinolones Counseling:  I discussed with the patient the risks of fluoroquinolones including but not limited to GI upset, allergic reaction, drug rash, diarrhea, dizziness, photosensitivity, yeast infections, liver function test abnormalities, tendonitis/tendon rupture.
Dutasteride Pregnancy And Lactation Text: This medication is absolutely contraindicated in women, especially during pregnancy and breast feeding. Feminization of male fetuses is possible if taking while pregnant.
Rinvoq Pregnancy And Lactation Text: Based on animal studies, Rinvoq may cause embryo-fetal harm when administered to pregnant women.  The medication should not be used in pregnancy.  Breastfeeding is not recommended during treatment and for 6 days after the last dose.
Rituxan Counseling:  I discussed with the patient the risks of Rituxan infusions. Side effects can include infusion reactions, severe drug rashes including mucocutaneous reactions, reactivation of latent hepatitis and other infections and rarely progressive multifocal leukoencephalopathy.  All of the patient's questions and concerns were addressed.
Valtrex Pregnancy And Lactation Text: this medication is Pregnancy Category B and is considered safe during pregnancy. This medication is not directly found in breast milk but it's metabolite acyclovir is present.
Oral Minoxidil Pregnancy And Lactation Text: This medication should only be used when clearly needed if you are pregnant, attempting to become pregnant or breast feeding.
Cimzia Counseling:  I discussed with the patient the risks of Cimzia including but not limited to immunosuppression, allergic reactions and infections.  The patient understands that monitoring is required including a PPD at baseline and must alert us or the primary physician if symptoms of infection or other concerning signs are noted.
Rifampin Counseling: I discussed with the patient the risks of rifampin including but not limited to liver damage, kidney damage, red-orange body fluids, nausea/vomiting and severe allergy.
Isotretinoin Pregnancy And Lactation Text: This medication is Pregnancy Category X and is considered extremely dangerous during pregnancy. It is unknown if it is excreted in breast milk.
Cimetidine Counseling:  I discussed with the patient the risks of Cimetidine including but not limited to gynecomastia, headache, diarrhea, nausea, drowsiness, arrhythmias, pancreatitis, skin rashes, psychosis, bone marrow suppression and kidney toxicity.
Winlevi Pregnancy And Lactation Text: This medication is considered safe during pregnancy and breastfeeding.
Winlevi Counseling:  I discussed with the patient the risks of topical clascoterone including but not limited to erythema, scaling, itching, and stinging. Patient voiced their understanding.
Erivedge Counseling- I discussed with the patient the risks of Erivedge including but not limited to nausea, vomiting, diarrhea, constipation, weight loss, changes in the sense of taste, decreased appetite, muscle spasms, and hair loss.  The patient verbalized understanding of the proper use and possible adverse effects of Erivedge.  All of the patient's questions and concerns were addressed.
Isotretinoin Counseling: Patient should get monthly blood tests, not donate blood, not drive at night if vision affected, not share medication, and not undergo elective surgery for 6 months after tx completed. Side effects reviewed, pt to contact office should one occur.
Eucrisa Counseling: Patient may experience a mild burning sensation during topical application. Eucrisa is not approved in children less than 2 years of age.
Qbrexza Counseling:  I discussed with the patient the risks of Qbrexza including but not limited to headache, mydriasis, blurred vision, dry eyes, nasal dryness, dry mouth, dry throat, dry skin, urinary hesitation, and constipation.  Local skin reactions including erythema, burning, stinging, and itching can also occur.
Aklief counseling:  Patient advised to apply a pea-sized amount only at bedtime and wait 30 minutes after washing their face before applying.  If too drying, patient may add a non-comedogenic moisturizer.  The most commonly reported side effects including irritation, redness, scaling, dryness, stinging, burning, itching, and increased risk of sunburn.  The patient verbalized understanding of the proper use and possible adverse effects of retinoids.  All of the patient's questions and concerns were addressed.
Eucrisa Pregnancy And Lactation Text: This medication has not been assigned a Pregnancy Risk Category but animal studies failed to show danger with the topical medication. It is unknown if the medication is excreted in breast milk.
Rituxan Pregnancy And Lactation Text: This medication is Pregnancy Category C and it isn't know if it is safe during pregnancy. It is unknown if this medication is excreted in breast milk but similar antibodies are known to be excreted.
Zyclara Counseling:  I discussed with the patient the risks of imiquimod including but not limited to erythema, scaling, itching, weeping, crusting, and pain.  Patient understands that the inflammatory response to imiquimod is variable from person to person and was educated regarded proper titration schedule.  If flu-like symptoms develop, patient knows to discontinue the medication and contact us.
Finasteride Male Counseling: Finasteride Counseling:  I discussed with the patient the risks of use of finasteride including but not limited to decreased libido, decreased ejaculate volume, gynecomastia, and depression. Women should not handle medication.  All of the patient's questions and concerns were addressed.
Otezla Counseling: The side effects of Otezla were discussed with the patient, including but not limited to worsening or new depression, weight loss, diarrhea, nausea, upper respiratory tract infection, and headache. Patient instructed to call the office should any adverse effect occur.  The patient verbalized understanding of the proper use and possible adverse effects of Otezla.  All the patient's questions and concerns were addressed.
Azithromycin Pregnancy And Lactation Text: This medication is considered safe during pregnancy and is also secreted in breast milk.
Azathioprine Counseling:  I discussed with the patient the risks of azathioprine including but not limited to myelosuppression, immunosuppression, hepatotoxicity, lymphoma, and infections.  The patient understands that monitoring is required including baseline LFTs, Creatinine, possible TPMP genotyping and weekly CBCs for the first month and then every 2 weeks thereafter.  The patient verbalized understanding of the proper use and possible adverse effects of azathioprine.  All of the patient's questions and concerns were addressed.
Azithromycin Counseling:  I discussed with the patient the risks of azithromycin including but not limited to GI upset, allergic reaction, drug rash, diarrhea, and yeast infections.
Qbrexza Pregnancy And Lactation Text: There is no available data on Qbrexza use in pregnant women.  There is no available data on Qbrexza use in lactation.
Cimzia Pregnancy And Lactation Text: This medication crosses the placenta but can be considered safe in certain situations. Cimzia may be excreted in breast milk.
Aklief Pregnancy And Lactation Text: It is unknown if this medication is safe to use during pregnancy.  It is unknown if this medication is excreted in breast milk.  Breastfeeding women should use the topical cream on the smallest area of the skin for the shortest time needed while breastfeeding.  Do not apply to nipple and areola.
Finasteride Pregnancy And Lactation Text: This medication is absolutely contraindicated during pregnancy. It is unknown if it is excreted in breast milk.
Otezla Pregnancy And Lactation Text: This medication is Pregnancy Category C and it isn't known if it is safe during pregnancy. It is unknown if it is excreted in breast milk.
Doxepin Counseling:  Patient advised that the medication is sedating and not to drive a car after taking this medication. Patient informed of potential adverse effects including but not limited to dry mouth, urinary retention, and blurry vision.  The patient verbalized understanding of the proper use and possible adverse effects of doxepin.  All of the patient's questions and concerns were addressed.
High Dose Vitamin A Pregnancy And Lactation Text: High dose vitamin A therapy is contraindicated during pregnancy and breast feeding.
Bactrim Counseling:  I discussed with the patient the risks of sulfa antibiotics including but not limited to GI upset, allergic reaction, drug rash, diarrhea, dizziness, photosensitivity, and yeast infections.  Rarely, more serious reactions can occur including but not limited to aplastic anemia, agranulocytosis, methemoglobinemia, blood dyscrasias, liver or kidney failure, lung infiltrates or desquamative/blistering drug rashes.
Rhofade Pregnancy And Lactation Text: This medication has not been assigned a Pregnancy Risk Category. It is unknown if the medication is excreted in breast milk.
Cellcept Counseling:  I discussed with the patient the risks of mycophenolate mofetil including but not limited to infection/immunosuppression, GI upset, hypokalemia, hypercholesterolemia, bone marrow suppression, lymphoproliferative disorders, malignancy, GI ulceration/bleed/perforation, colitis, interstitial lung disease, kidney failure, progressive multifocal leukoencephalopathy, and birth defects.  The patient understands that monitoring is required including a baseline creatinine and regular CBC testing. In addition, patient must alert us immediately if symptoms of infection or other concerning signs are noted.
Sarecycline Counseling: Patient advised regarding possible photosensitivity and discoloration of the teeth, skin, lips, tongue and gums.  Patient instructed to avoid sunlight, if possible.  When exposed to sunlight, patients should wear protective clothing, sunglasses, and sunscreen.  The patient was instructed to call the office immediately if the following severe adverse effects occur:  hearing changes, easy bruising/bleeding, severe headache, or vision changes.  The patient verbalized understanding of the proper use and possible adverse effects of sarecycline.  All of the patient's questions and concerns were addressed.
Rhofade Counseling: Rhofade is a topical medication which can decrease superficial blood flow where applied. Side effects are uncommon and include stinging, redness and allergic reactions.
Rifampin Pregnancy And Lactation Text: This medication is Pregnancy Category C and it isn't know if it is safe during pregnancy. It is also excreted in breast milk and should not be used if you are breast feeding.
Azathioprine Pregnancy And Lactation Text: This medication is Pregnancy Category D and isn't considered safe during pregnancy. It is unknown if this medication is excreted in breast milk.
High Dose Vitamin A Counseling: Side effects reviewed, pt to contact office should one occur.
Azelaic Acid Counseling: Patient counseled that medicine may cause skin irritation and to avoid applying near the eyes.  In the event of skin irritation, the patient was advised to reduce the amount of the drug applied or use it less frequently.   The patient verbalized understanding of the proper use and possible adverse effects of azelaic acid.  All of the patient's questions and concerns were addressed.
Hydroquinone Counseling:  Patient advised that medication may result in skin irritation, lightening (hypopigmentation), dryness, and burning.  In the event of skin irritation, the patient was advised to reduce the amount of the drug applied or use it less frequently.  Rarely, spots that are treated with hydroquinone can become darker (pseudoochronosis).  Should this occur, patient instructed to stop medication and call the office. The patient verbalized understanding of the proper use and possible adverse effects of hydroquinone.  All of the patient's questions and concerns were addressed.
Cosentyx Counseling:  I discussed with the patient the risks of Cosentyx including but not limited to worsening of Crohn's disease, immunosuppression, allergic reactions and infections.  The patient understands that monitoring is required including a PPD at baseline and must alert us or the primary physician if symptoms of infection or other concerning signs are noted.
Siliq Counseling:  I discussed with the patient the risks of Siliq including but not limited to new or worsening depression, suicidal thoughts and behavior, immunosuppression, malignancy, posterior leukoencephalopathy syndrome, and serious infections.  The patient understands that monitoring is required including a PPD at baseline and must alert us or the primary physician if symptoms of infection or other concerning signs are noted. There is also a special program designed to monitor depression which is required with Siliq.
Oxybutynin Counseling:  I discussed with the patient the risks of oxybutynin including but not limited to skin rash, drowsiness, dry mouth, difficulty urinating, and blurred vision.
Solaraze Counseling:  I discussed with the patient the risks of Solaraze including but not limited to erythema, scaling, itching, weeping, crusting, and pain.
Bactrim Pregnancy And Lactation Text: This medication is Pregnancy Category D and is known to cause fetal risk.  It is also excreted in breast milk.
Imiquimod Counseling:  I discussed with the patient the risks of imiquimod including but not limited to erythema, scaling, itching, weeping, crusting, and pain.  Patient understands that the inflammatory response to imiquimod is variable from person to person and was educated regarded proper titration schedule.  If flu-like symptoms develop, patient knows to discontinue the medication and contact us.
Doxepin Pregnancy And Lactation Text: This medication is Pregnancy Category C and it isn't known if it is safe during pregnancy. It is also excreted in breast milk and breast feeding isn't recommended.
Gabapentin Counseling: I discussed with the patient the risks of gabapentin including but not limited to dizziness, somnolence, fatigue and ataxia.
Tetracycline Counseling: Patient counseled regarding possible photosensitivity and increased risk for sunburn.  Patient instructed to avoid sunlight, if possible.  When exposed to sunlight, patients should wear protective clothing, sunglasses, and sunscreen.  The patient was instructed to call the office immediately if the following severe adverse effects occur:  hearing changes, easy bruising/bleeding, severe headache, or vision changes.  The patient verbalized understanding of the proper use and possible adverse effects of tetracycline.  All of the patient's questions and concerns were addressed. Patient understands to avoid pregnancy while on therapy due to potential birth defects.
Opioid Counseling: I discussed with the patient the potential side effects of opioids including but not limited to addiction, altered mental status, and depression. I stressed avoiding alcohol, benzodiazepines, muscle relaxants and sleep aids unless specifically okayed by a physician. The patient verbalized understanding of the proper use and possible adverse effects of opioids. All of the patient's questions and concerns were addressed. They were instructed to flush the remaining pills down the toilet if they did not need them for pain.
Cephalexin Counseling: I counseled the patient regarding use of cephalexin as an antibiotic for prophylactic and/or therapeutic purposes. Cephalexin (commonly prescribed under brand name Keflex) is a cephalosporin antibiotic which is active against numerous classes of bacteria, including most skin bacteria. Side effects may include nausea, diarrhea, gastrointestinal upset, rash, hives, yeast infections, and in rare cases, hepatitis, kidney disease, seizures, fever, confusion, neurologic symptoms, and others. Patients with severe allergies to penicillin medications are cautioned that there is about a 10% incidence of cross-reactivity with cephalosporins. When possible, patients with penicillin allergies should use alternatives to cephalosporins for antibiotic therapy.
Cyclophosphamide Counseling:  I discussed with the patient the risks of cyclophosphamide including but not limited to hair loss, hormonal abnormalities, decreased fertility, abdominal pain, diarrhea, nausea and vomiting, bone marrow suppression and infection. The patient understands that monitoring is required while taking this medication.
Solaraze Pregnancy And Lactation Text: This medication is Pregnancy Category B and is considered safe. There is some data to suggest avoiding during the third trimester. It is unknown if this medication is excreted in breast milk.
Hydroxyzine Counseling: Patient advised that the medication is sedating and not to drive a car after taking this medication.  Patient informed of potential adverse effects including but not limited to dry mouth, urinary retention, and blurry vision.  The patient verbalized understanding of the proper use and possible adverse effects of hydroxyzine.  All of the patient's questions and concerns were addressed.
Simponi Counseling:  I discussed with the patient the risks of golimumab including but not limited to myelosuppression, immunosuppression, autoimmune hepatitis, demyelinating diseases, lymphoma, and serious infections.  The patient understands that monitoring is required including a PPD at baseline and must alert us or the primary physician if symptoms of infection or other concerning signs are noted.
Benzoyl Peroxide Counseling: Patient counseled that medicine may cause skin irritation and bleach clothing.  In the event of skin irritation, the patient was advised to reduce the amount of the drug applied or use it less frequently.   The patient verbalized understanding of the proper use and possible adverse effects of benzoyl peroxide.  All of the patient's questions and concerns were addressed.
Dupixent Counseling: I discussed with the patient the risks of dupilumab including but not limited to eye infection and irritation, cold sores, injection site reactions, worsening of asthma, allergic reactions and increased risk of parasitic infection.  Live vaccines should be avoided while taking dupilumab. Dupilumab will also interact with certain medications such as warfarin and cyclosporine. The patient understands that monitoring is required and they must alert us or the primary physician if symptoms of infection or other concerning signs are noted.
Skyrizi Counseling: I discussed with the patient the risks of risankizumab-rzaa including but not limited to immunosuppression, and serious infections.  The patient understands that monitoring is required including a PPD at baseline and must alert us or the primary physician if symptoms of infection or other concerning signs are noted.
Cyclophosphamide Pregnancy And Lactation Text: This medication is Pregnancy Category D and it isn't considered safe during pregnancy. This medication is excreted in breast milk.
Klisyri Counseling:  I discussed with the patient the risks of Klisyri including but not limited to erythema, scaling, itching, weeping, crusting, and pain.
Cephalexin Pregnancy And Lactation Text: This medication is Pregnancy Category B and considered safe during pregnancy.  It is also excreted in breast milk but can be used safely for shorter doses.
Topical Retinoid counseling:  Patient advised to apply a pea-sized amount only at bedtime and wait 30 minutes after washing their face before applying.  If too drying, patient may add a non-comedogenic moisturizer. The patient verbalized understanding of the proper use and possible adverse effects of retinoids.  All of the patient's questions and concerns were addressed.
Enbrel Counseling:  I discussed with the patient the risks of etanercept including but not limited to myelosuppression, immunosuppression, autoimmune hepatitis, demyelinating diseases, lymphoma, and infections.  The patient understands that monitoring is required including a PPD at baseline and must alert us or the primary physician if symptoms of infection or other concerning signs are noted.
Carac Counseling:  I discussed with the patient the risks of Carac including but not limited to erythema, scaling, itching, weeping, crusting, and pain.
Dupixent Pregnancy And Lactation Text: This medication likely crosses the placenta but the risk for the fetus is uncertain. This medication is excreted in breast milk.
Glycopyrrolate Counseling:  I discussed with the patient the risks of glycopyrrolate including but not limited to skin rash, drowsiness, dry mouth, difficulty urinating, and blurred vision.
Benzoyl Peroxide Pregnancy And Lactation Text: This medication is Pregnancy Category C. It is unknown if benzoyl peroxide is excreted in breast milk.
Hydroxyzine Pregnancy And Lactation Text: This medication is not safe during pregnancy and should not be taken. It is also excreted in breast milk and breast feeding isn't recommended.
Propranolol Counseling:  I discussed with the patient the risks of propranolol including but not limited to low heart rate, low blood pressure, low blood sugar, restlessness and increased cold sensitivity. They should call the office if they experience any of these side effects.
Opioid Pregnancy And Lactation Text: These medications can lead to premature delivery and should be avoided during pregnancy. These medications are also present in breast milk in small amounts.
Clindamycin Counseling: I counseled the patient regarding use of clindamycin as an antibiotic for prophylactic and/or therapeutic purposes. Clindamycin is active against numerous classes of bacteria, including skin bacteria. Side effects may include nausea, diarrhea, gastrointestinal upset, rash, hives, yeast infections, and in rare cases, colitis.
Cyclosporine Counseling:  I discussed with the patient the risks of cyclosporine including but not limited to hypertension, gingival hyperplasia,myelosuppression, immunosuppression, liver damage, kidney damage, neurotoxicity, lymphoma, and serious infections. The patient understands that monitoring is required including baseline blood pressure, CBC, CMP, lipid panel and uric acid, and then 1-2 times monthly CMP and blood pressure.
Birth Control Pills Counseling: Birth Control Pill Counseling: I discussed with the patient the potential side effects of OCPs including but not limited to increased risk of stroke, heart attack, thrombophlebitis, deep venous thrombosis, hepatic adenomas, breast changes, GI upset, headaches, and depression.  The patient verbalized understanding of the proper use and possible adverse effects of OCPs. All of the patient's questions and concerns were addressed.
Propranolol Pregnancy And Lactation Text: This medication is Pregnancy Category C and it isn't known if it is safe during pregnancy. It is excreted in breast milk.
Glycopyrrolate Pregnancy And Lactation Text: This medication is Pregnancy Category B and is considered safe during pregnancy. It is unknown if it is excreted breast milk.
Klisyri Pregnancy And Lactation Text: It is unknown if this medication can harm a developing fetus or if it is excreted in breast milk.
Humira Counseling:  I discussed with the patient the risks of adalimumab including but not limited to myelosuppression, immunosuppression, autoimmune hepatitis, demyelinating diseases, lymphoma, and serious infections.  The patient understands that monitoring is required including a PPD at baseline and must alert us or the primary physician if symptoms of infection or other concerning signs are noted.
Stelara Counseling:  I discussed with the patient the risks of ustekinumab including but not limited to immunosuppression, malignancy, posterior leukoencephalopathy syndrome, and serious infections.  The patient understands that monitoring is required including a PPD at baseline and must alert us or the primary physician if symptoms of infection or other concerning signs are noted.
Calcipotriene Counseling:  I discussed with the patient the risks of calcipotriene including but not limited to erythema, scaling, itching, and irritation.
Minoxidil Counseling: Minoxidil is a topical medication which can increase blood flow where it is applied. It is uncertain how this medication increases hair growth. Side effects are uncommon and include stinging and allergic reactions.
Albendazole Pregnancy And Lactation Text: This medication is Pregnancy Category C and it isn't known if it is safe during pregnancy. It is also excreted in breast milk.
Hydroxychloroquine Pregnancy And Lactation Text: This medication has been shown to cause fetal harm but it isn't assigned a Pregnancy Risk Category. There are small amounts excreted in breast milk.
Fluconazole Counseling:  Patient counseled regarding adverse effects of fluconazole including but not limited to headache, diarrhea, nausea, upset stomach, liver function test abnormalities, taste disturbance, and stomach pain.  There is a rare possibility of liver failure that can occur when taking fluconazole.  The patient understands that monitoring of LFTs and kidney function test may be required, especially at baseline. The patient verbalized understanding of the proper use and possible adverse effects of fluconazole.  All of the patient's questions and concerns were addressed.
Arava Counseling:  Patient counseled regarding adverse effects of Arava including but not limited to nausea, vomiting, abnormalities in liver function tests. Patients may develop mouth sores, rash, diarrhea, and abnormalities in blood counts. The patient understands that monitoring is required including LFTs and blood counts.  There is a rare possibility of scarring of the liver and lung problems that can occur when taking methotrexate. Persistent nausea, loss of appetite, pale stools, dark urine, cough, and shortness of breath should be reported immediately. Patient advised to discontinue Arava treatment and consult with a physician prior to attempting conception. The patient will have to undergo a treatment to eliminate Arava from the body prior to conception.
Hydroxychloroquine Counseling:  I discussed with the patient that a baseline ophthalmologic exam is needed at the start of therapy and every year thereafter while on therapy. A CBC may also be warranted for monitoring.  The side effects of this medication were discussed with the patient, including but not limited to agranulocytosis, aplastic anemia, seizures, rashes, retinopathy, and liver toxicity. Patient instructed to call the office should any adverse effect occur.  The patient verbalized understanding of the proper use and possible adverse effects of Plaquenil.  All the patient's questions and concerns were addressed.
Albendazole Counseling:  I discussed with the patient the risks of albendazole including but not limited to cytopenia, kidney damage, nausea/vomiting and severe allergy.  The patient understands that this medication is being used in an off-label manner.
Clindamycin Pregnancy And Lactation Text: This medication can be used in pregnancy if certain situations. Clindamycin is also present in breast milk.
Tazorac Counseling:  Patient advised that medication is irritating and drying.  Patient may need to apply sparingly and wash off after an hour before eventually leaving it on overnight.  The patient verbalized understanding of the proper use and possible adverse effects of tazorac.  All of the patient's questions and concerns were addressed.
Libtayo Counseling- I discussed with the patient the risks of Libtayo including but not limited to nausea, vomiting, diarrhea, and bone or muscle pain.  The patient verbalized understanding of the proper use and possible adverse effects of Libtayo.  All of the patient's questions and concerns were addressed.
Ivermectin Counseling:  Patient instructed to take medication on an empty stomach with a full glass of water.  Patient informed of potential adverse effects including but not limited to nausea, diarrhea, dizziness, itching, and swelling of the extremities or lymph nodes.  The patient verbalized understanding of the proper use and possible adverse effects of ivermectin.  All of the patient's questions and concerns were addressed.
Spironolactone Counseling: Patient advised regarding risks of diarrhea, abdominal pain, hyperkalemia, birth defects (for female patients), liver toxicity and renal toxicity. The patient may need blood work to monitor liver and kidney function and potassium levels while on therapy. The patient verbalized understanding of the proper use and possible adverse effects of spironolactone.  All of the patient's questions and concerns were addressed.
Methotrexate Counseling:  Patient counseled regarding adverse effects of methotrexate including but not limited to nausea, vomiting, abnormalities in liver function tests. Patients may develop mouth sores, rash, diarrhea, and abnormalities in blood counts. The patient understands that monitoring is required including LFT's and blood counts.  There is a rare possibility of scarring of the liver and lung problems that can occur when taking methotrexate. Persistent nausea, loss of appetite, pale stools, dark urine, cough, and shortness of breath should be reported immediately. Patient advised to discontinue methotrexate treatment at least three months before attempting to become pregnant.  I discussed the need for folate supplements while taking methotrexate.  These supplements can decrease side effects during methotrexate treatment. The patient verbalized understanding of the proper use and possible adverse effects of methotrexate.  All of the patient's questions and concerns were addressed.
Birth Control Pills Pregnancy And Lactation Text: This medication should be avoided if pregnant and for the first 30 days post-partum.
Doxycycline Counseling:  Patient counseled regarding possible photosensitivity and increased risk for sunburn.  Patient instructed to avoid sunlight, if possible.  When exposed to sunlight, patients should wear protective clothing, sunglasses, and sunscreen.  The patient was instructed to call the office immediately if the following severe adverse effects occur:  hearing changes, easy bruising/bleeding, severe headache, or vision changes.  The patient verbalized understanding of the proper use and possible adverse effects of doxycycline.  All of the patient's questions and concerns were addressed.
Tazorac Pregnancy And Lactation Text: This medication is not safe during pregnancy. It is unknown if this medication is excreted in breast milk.
Calcipotriene Pregnancy And Lactation Text: This medication has not been proven safe during pregnancy. It is unknown if this medication is excreted in breast milk.
Ilumya Counseling: I discussed with the patient the risks of tildrakizumab including but not limited to immunosuppression, malignancy, posterior leukoencephalopathy syndrome, and serious infections.  The patient understands that monitoring is required including a PPD at baseline and must alert us or the primary physician if symptoms of infection or other concerning signs are noted.
Spironolactone Pregnancy And Lactation Text: This medication can cause feminization of the male fetus and should be avoided during pregnancy. The active metabolite is also found in breast milk.
Libtayo Pregnancy And Lactation Text: This medication is contraindicated in pregnancy and when breast feeding.
Erythromycin Counseling:  I discussed with the patient the risks of erythromycin including but not limited to GI upset, allergic reaction, drug rash, diarrhea, increase in liver enzymes, and yeast infections.
Griseofulvin Pregnancy And Lactation Text: This medication is Pregnancy Category X and is known to cause serious birth defects. It is unknown if this medication is excreted in breast milk but breast feeding should be avoided.
Prednisone Counseling:  I discussed with the patient the risks of prolonged use of prednisone including but not limited to weight gain, insomnia, osteoporosis, mood changes, diabetes, susceptibility to infection, glaucoma and high blood pressure.  In cases where prednisone use is prolonged, patients should be monitored with blood pressure checks, serum glucose levels and an eye exam.  Additionally, the patient may need to be placed on GI prophylaxis, PCP prophylaxis, and calcium and vitamin D supplementation and/or a bisphosphonate.  The patient verbalized understanding of the proper use and the possible adverse effects of prednisone.  All of the patient's questions and concerns were addressed.
Topical Clindamycin Counseling: Patient counseled that this medication may cause skin irritation or allergic reactions.  In the event of skin irritation, the patient was advised to reduce the amount of the drug applied or use it less frequently.   The patient verbalized understanding of the proper use and possible adverse effects of clindamycin.  All of the patient's questions and concerns were addressed.
Clofazimine Counseling:  I discussed with the patient the risks of clofazimine including but not limited to skin and eye pigmentation, liver damage, nausea/vomiting, gastrointestinal bleeding and allergy.
Doxycycline Pregnancy And Lactation Text: This medication is Pregnancy Category D and not consider safe during pregnancy. It is also excreted in breast milk but is considered safe for shorter treatment courses.
Methotrexate Pregnancy And Lactation Text: This medication is Pregnancy Category X and is known to cause fetal harm. This medication is excreted in breast milk.
Griseofulvin Counseling:  I discussed with the patient the risks of griseofulvin including but not limited to photosensitivity, cytopenia, liver damage, nausea/vomiting and severe allergy.  The patient understands that this medication is best absorbed when taken with a fatty meal (e.g., ice cream or french fries).
Mirvaso Counseling: Mirvaso is a topical medication which can decrease superficial blood flow where applied. Side effects are uncommon and include stinging, redness and allergic reactions.
Taltz Counseling: I discussed with the patient the risks of ixekizumab including but not limited to immunosuppression, serious infections, worsening of inflammatory bowel disease and drug reactions.  The patient understands that monitoring is required including a PPD at baseline and must alert us or the primary physician if symptoms of infection or other concerning signs are noted.
SSKI Counseling:  I discussed with the patient the risks of SSKI including but not limited to thyroid abnormalities, metallic taste, GI upset, fever, headache, acne, arthralgias, paraesthesias, lymphadenopathy, easy bleeding, arrhythmias, and allergic reaction.
Itraconazole Counseling:  I discussed with the patient the risks of itraconazole including but not limited to liver damage, nausea/vomiting, neuropathy, and severe allergy.  The patient understands that this medication is best absorbed when taken with acidic beverages such as non-diet cola or ginger ale.  The patient understands that monitoring is required including baseline LFTs and repeat LFTs at intervals.  The patient understands that they are to contact us or the primary physician if concerning signs are noted.
Erythromycin Pregnancy And Lactation Text: This medication is Pregnancy Category B and is considered safe during pregnancy. It is also excreted in breast milk.
Topical Ketoconazole Counseling: Patient counseled that this medication may cause skin irritation or allergic reactions.  In the event of skin irritation, the patient was advised to reduce the amount of the drug applied or use it less frequently.   The patient verbalized understanding of the proper use and possible adverse effects of ketoconazole.  All of the patient's questions and concerns were addressed.
Colchicine Counseling:  Patient counseled regarding adverse effects including but not limited to stomach upset (nausea, vomiting, stomach pain, or diarrhea).  Patient instructed to limit alcohol consumption while taking this medication.  Colchicine may reduce blood counts especially with prolonged use.  The patient understands that monitoring of kidney function and blood counts may be required, especially at baseline. The patient verbalized understanding of the proper use and possible adverse effects of colchicine.  All of the patient's questions and concerns were addressed.
Detail Level: Simple
Niacinamide Counseling: I recommended taking niacin or niacinamide, also know as vitamin B3, twice daily. Recent evidence suggests that taking vitamin B3 (500 mg twice daily) can reduce the risk of actinic keratoses and non-melanoma skin cancers. Side effects of vitamin B3 include flushing and headache.
Cantharidin Pregnancy And Lactation Text: The use of this medication during pregnancy or lactation is not recommended as there is insufficient data.

## 2022-08-12 DIAGNOSIS — J01.00 ACUTE NON-RECURRENT MAXILLARY SINUSITIS: ICD-10-CM

## 2022-08-16 RX ORDER — BECLOMETHASONE DIPROPIONATE 80 UG/1
1 AEROSOL, METERED NASAL DAILY
Qty: 10.6 G | Refills: 0 | Status: SHIPPED | OUTPATIENT
Start: 2022-08-16 | End: 2023-05-10 | Stop reason: SDUPTHER

## 2022-11-10 ENCOUNTER — HOSPITAL ENCOUNTER (OUTPATIENT)
Facility: MEDICAL CENTER | Age: 57
End: 2022-11-10
Attending: PHYSICIAN ASSISTANT
Payer: COMMERCIAL

## 2022-11-10 ENCOUNTER — OFFICE VISIT (OUTPATIENT)
Dept: MEDICAL GROUP | Facility: IMAGING CENTER | Age: 57
End: 2022-11-10
Payer: COMMERCIAL

## 2022-11-10 VITALS
WEIGHT: 190 LBS | DIASTOLIC BLOOD PRESSURE: 90 MMHG | BODY MASS INDEX: 29.82 KG/M2 | HEIGHT: 67 IN | TEMPERATURE: 98.9 F | SYSTOLIC BLOOD PRESSURE: 136 MMHG | HEART RATE: 69 BPM | OXYGEN SATURATION: 96 % | RESPIRATION RATE: 17 BRPM

## 2022-11-10 DIAGNOSIS — J45.40 MODERATE PERSISTENT ASTHMA WITHOUT COMPLICATION: ICD-10-CM

## 2022-11-10 DIAGNOSIS — B00.9 HSV-2 (HERPES SIMPLEX VIRUS 2) INFECTION: ICD-10-CM

## 2022-11-10 DIAGNOSIS — E78.2 MIXED HYPERLIPIDEMIA: ICD-10-CM

## 2022-11-10 DIAGNOSIS — M25.542 JOINT PAIN IN BOTH HANDS: ICD-10-CM

## 2022-11-10 DIAGNOSIS — E55.9 VITAMIN D DEFICIENCY: ICD-10-CM

## 2022-11-10 DIAGNOSIS — M25.541 JOINT PAIN IN BOTH HANDS: ICD-10-CM

## 2022-11-10 DIAGNOSIS — Z13.29 SCREENING FOR THYROID DISORDER: ICD-10-CM

## 2022-11-10 DIAGNOSIS — R10.13 DYSPEPSIA: ICD-10-CM

## 2022-11-10 DIAGNOSIS — Z13.1 DIABETES MELLITUS SCREENING: ICD-10-CM

## 2022-11-10 DIAGNOSIS — R53.83 OTHER FATIGUE: ICD-10-CM

## 2022-11-10 DIAGNOSIS — Z13.21 ENCOUNTER FOR VITAMIN DEFICIENCY SCREENING: ICD-10-CM

## 2022-11-10 DIAGNOSIS — Z13.0 SCREENING FOR DEFICIENCY ANEMIA: ICD-10-CM

## 2022-11-10 PROBLEM — Z23 NEEDS FLU SHOT: Status: RESOLVED | Noted: 2019-11-12 | Resolved: 2022-11-10

## 2022-11-10 PROBLEM — J01.00 ACUTE NON-RECURRENT MAXILLARY SINUSITIS: Status: RESOLVED | Noted: 2019-11-12 | Resolved: 2022-11-10

## 2022-11-10 PROBLEM — Z20.5 EXPOSURE TO HEPATITIS C: Status: RESOLVED | Noted: 2019-11-12 | Resolved: 2022-11-10

## 2022-11-10 PROCEDURE — 99214 OFFICE O/P EST MOD 30 MIN: CPT | Performed by: PHYSICIAN ASSISTANT

## 2022-11-10 PROCEDURE — 80307 DRUG TEST PRSMV CHEM ANLYZR: CPT

## 2022-11-10 RX ORDER — TRAMADOL HYDROCHLORIDE 50 MG/1
50 TABLET ORAL
Qty: 30 TABLET | Refills: 0 | Status: SHIPPED | OUTPATIENT
Start: 2022-11-10 | End: 2022-12-10

## 2022-11-10 RX ORDER — OMEPRAZOLE 40 MG/1
40 CAPSULE, DELAYED RELEASE ORAL DAILY
Qty: 30 CAPSULE | Refills: 0 | Status: SHIPPED | OUTPATIENT
Start: 2022-11-10 | End: 2022-12-19 | Stop reason: SDUPTHER

## 2022-11-10 SDOH — HEALTH STABILITY: PHYSICAL HEALTH: ON AVERAGE, HOW MANY MINUTES DO YOU ENGAGE IN EXERCISE AT THIS LEVEL?: 50 MIN

## 2022-11-10 SDOH — ECONOMIC STABILITY: HOUSING INSECURITY
IN THE LAST 12 MONTHS, WAS THERE A TIME WHEN YOU DID NOT HAVE A STEADY PLACE TO SLEEP OR SLEPT IN A SHELTER (INCLUDING NOW)?: NO

## 2022-11-10 SDOH — HEALTH STABILITY: PHYSICAL HEALTH: ON AVERAGE, HOW MANY DAYS PER WEEK DO YOU ENGAGE IN MODERATE TO STRENUOUS EXERCISE (LIKE A BRISK WALK)?: 3 DAYS

## 2022-11-10 SDOH — ECONOMIC STABILITY: FOOD INSECURITY: WITHIN THE PAST 12 MONTHS, YOU WORRIED THAT YOUR FOOD WOULD RUN OUT BEFORE YOU GOT MONEY TO BUY MORE.: NEVER TRUE

## 2022-11-10 SDOH — ECONOMIC STABILITY: INCOME INSECURITY: IN THE LAST 12 MONTHS, WAS THERE A TIME WHEN YOU WERE NOT ABLE TO PAY THE MORTGAGE OR RENT ON TIME?: NO

## 2022-11-10 SDOH — HEALTH STABILITY: MENTAL HEALTH
STRESS IS WHEN SOMEONE FEELS TENSE, NERVOUS, ANXIOUS, OR CAN'T SLEEP AT NIGHT BECAUSE THEIR MIND IS TROUBLED. HOW STRESSED ARE YOU?: TO SOME EXTENT

## 2022-11-10 SDOH — ECONOMIC STABILITY: TRANSPORTATION INSECURITY
IN THE PAST 12 MONTHS, HAS THE LACK OF TRANSPORTATION KEPT YOU FROM MEDICAL APPOINTMENTS OR FROM GETTING MEDICATIONS?: NO

## 2022-11-10 SDOH — ECONOMIC STABILITY: FOOD INSECURITY: WITHIN THE PAST 12 MONTHS, THE FOOD YOU BOUGHT JUST DIDN'T LAST AND YOU DIDN'T HAVE MONEY TO GET MORE.: NEVER TRUE

## 2022-11-10 SDOH — ECONOMIC STABILITY: TRANSPORTATION INSECURITY
IN THE PAST 12 MONTHS, HAS LACK OF RELIABLE TRANSPORTATION KEPT YOU FROM MEDICAL APPOINTMENTS, MEETINGS, WORK OR FROM GETTING THINGS NEEDED FOR DAILY LIVING?: NO

## 2022-11-10 SDOH — ECONOMIC STABILITY: INCOME INSECURITY: HOW HARD IS IT FOR YOU TO PAY FOR THE VERY BASICS LIKE FOOD, HOUSING, MEDICAL CARE, AND HEATING?: NOT HARD AT ALL

## 2022-11-10 SDOH — ECONOMIC STABILITY: HOUSING INSECURITY: IN THE LAST 12 MONTHS, HOW MANY PLACES HAVE YOU LIVED?: 1

## 2022-11-10 SDOH — ECONOMIC STABILITY: TRANSPORTATION INSECURITY
IN THE PAST 12 MONTHS, HAS LACK OF TRANSPORTATION KEPT YOU FROM MEETINGS, WORK, OR FROM GETTING THINGS NEEDED FOR DAILY LIVING?: NO

## 2022-11-10 ASSESSMENT — SOCIAL DETERMINANTS OF HEALTH (SDOH)
HOW HARD IS IT FOR YOU TO PAY FOR THE VERY BASICS LIKE FOOD, HOUSING, MEDICAL CARE, AND HEATING?: NOT HARD AT ALL
DO YOU BELONG TO ANY CLUBS OR ORGANIZATIONS SUCH AS CHURCH GROUPS UNIONS, FRATERNAL OR ATHLETIC GROUPS, OR SCHOOL GROUPS?: NO
HOW OFTEN DO YOU ATTEND CHURCH OR RELIGIOUS SERVICES?: NEVER
HOW OFTEN DO YOU ATTEND CHURCH OR RELIGIOUS SERVICES?: NEVER
HOW OFTEN DO YOU ATTENT MEETINGS OF THE CLUB OR ORGANIZATION YOU BELONG TO?: NEVER
WITHIN THE PAST 12 MONTHS, YOU WORRIED THAT YOUR FOOD WOULD RUN OUT BEFORE YOU GOT THE MONEY TO BUY MORE: NEVER TRUE
HOW OFTEN DO YOU GET TOGETHER WITH FRIENDS OR RELATIVES?: TWICE A WEEK
IN A TYPICAL WEEK, HOW MANY TIMES DO YOU TALK ON THE PHONE WITH FAMILY, FRIENDS, OR NEIGHBORS?: THREE TIMES A WEEK
HOW OFTEN DO YOU GET TOGETHER WITH FRIENDS OR RELATIVES?: TWICE A WEEK
HOW OFTEN DO YOU HAVE A DRINK CONTAINING ALCOHOL: 4 OR MORE TIMES A WEEK
HOW OFTEN DO YOU ATTENT MEETINGS OF THE CLUB OR ORGANIZATION YOU BELONG TO?: NEVER
HOW OFTEN DO YOU HAVE SIX OR MORE DRINKS ON ONE OCCASION: NEVER
IN A TYPICAL WEEK, HOW MANY TIMES DO YOU TALK ON THE PHONE WITH FAMILY, FRIENDS, OR NEIGHBORS?: THREE TIMES A WEEK
HOW MANY DRINKS CONTAINING ALCOHOL DO YOU HAVE ON A TYPICAL DAY WHEN YOU ARE DRINKING: 1 OR 2
DO YOU BELONG TO ANY CLUBS OR ORGANIZATIONS SUCH AS CHURCH GROUPS UNIONS, FRATERNAL OR ATHLETIC GROUPS, OR SCHOOL GROUPS?: NO

## 2022-11-10 ASSESSMENT — PAIN SCALES - GENERAL: PAINLEVEL: NO PAIN

## 2022-11-10 ASSESSMENT — LIFESTYLE VARIABLES
AUDIT-C TOTAL SCORE: 4
SKIP TO QUESTIONS 9-10: 1
HOW MANY STANDARD DRINKS CONTAINING ALCOHOL DO YOU HAVE ON A TYPICAL DAY: 1 OR 2
HOW OFTEN DO YOU HAVE SIX OR MORE DRINKS ON ONE OCCASION: NEVER
HOW OFTEN DO YOU HAVE A DRINK CONTAINING ALCOHOL: 4 OR MORE TIMES A WEEK

## 2022-11-10 NOTE — PROGRESS NOTES
Subjective:     CC:   Chief Complaint   Patient presents with    [facility]    Medication Refill    Arthritis     Hx of arthritis on bilateral hands. Pt is taking anti inflammatory drugs that are causing side effects .          HPI:   Kaley presents today to discuss:    Moderate persistent asthma without complication  Chronic, on Advair and Singulair. Uses albuterol as needed. Has seen a provider at [facility] - multiple environmental allergies s/p allergy shots.    Mixed hyperlipidemia  Chronic, on Crestor 10 mg daily.  Admits to occasional muscle cramping.    Dyspepsia  Patient admits to upset stomach, indigestion, GERD and occasional diarrhea for the past several months.  She states it all started after taking chronic NSAIDs for her hand pain.  She states her symptoms have mildly improved since discontinuing.  Takes Pepcid.  Denies any melena or hematochezia.    HSV-2 (herpes simplex virus 2) infection  Chronic, managed by her gynecologist.  On Valtrex daily.    Other fatigue  Patient is to chronic fatigue and frequent awakenings at night.  She states that she has been told in the past that she likely has sleep apnea.  Requesting consultation.    Joint pain in both hands  Patient admits to chronic joint pain in both of her hands with osteoarthritis.  She states that she is managed by a provider at the [facility].  Wears braces on her hands daily and uses Voltaren gel.  Has had stomach upset and GI bleed from NSAIDs in the past.    Ob-Gyn/ History:    Patient has GYN provider: [facility]  /Para:    Patient's last menstrual period was 2019 (approximate).  Last pap smear: this year  History of abnormal pap smears: not within the past 40 years  On estrogen and progesterone - managed by gyn  Currently sexually active: yes  H/O STD: HSV2  Post-menopausal bleeding: no  Breast Cancer Screening: [facility] 3D      Past Medical History:   Diagnosis Date    Asthma 2018    with  inhalers    Depression     Pain 2018    hands, arms, shoulder and neck     Family History   Problem Relation Age of Onset    Cancer Father     Heart Disease Brother      Past Surgical History:   Procedure Laterality Date    CERVICAL DISK AND FUSION ANTERIOR N/A 9/10/2018    Procedure: CERVICAL DISK AND FUSION ANTERIOR- C5-7 WITH PLATE;  Surgeon: Wallace Zamora M.D.;  Location: SURGERY [facility];  Service: Neurosurgery    TUBAL COAGULATION LAPAROSCOPIC BILATERAL  1994     Social History     Tobacco Use    Smoking status: Former     Types: Cigarettes     Quit date: 1987     Years since quittin.8    Smokeless tobacco: Never   Vaping Use    Vaping Use: Never used   Substance Use Topics    Alcohol use: Yes     Alcohol/week: 1.0 oz     Types: 2 Glasses of wine per week     Comment: 1-5 per week    Drug use: No     Social History     Social History Narrative    Not on file     Current Outpatient Medications Ordered in Epic   Medication Sig Dispense Refill    traMADol (ULTRAM) 50 MG Tab Take 1 Tablet by mouth at bedtime as needed for Severe Pain for up to 30 days. 30 Tablet 0    omeprazole (PRILOSEC) 40 MG delayed-release capsule Take 1 Capsule by mouth every day. 30 Capsule 0    Beclomethasone Dipropionate (QNASL) 80 MCG/ACT Aero Soln Administer 1 Spray into affected nostril(S) every day. 10.6 g 0    valACYclovir (VALTREX) 500 MG Tab TAKE ONE TABLET BY MOUTH TWICE DAILY FOR TEN DAYS.      tretinoin (RETIN-A) 0.1 % cream Apply topically to affected area at night. 45 g 3    clindamycin-benzoyl peroxide (BENZACLIN) gel Apply 1 Application topically 2 times a day. 50 g 3    citalopram (CELEXA) 20 MG Tab Take 1 Tablet by mouth every day. 90 Tablet 0    montelukast (SINGULAIR) 10 MG Tab Take 1 Tablet by mouth every day. 90 Tablet 0    albuterol 108 (90 Base) MCG/ACT Aero Soln inhalation aerosol Inhale 2 Puffs every four hours as needed for Shortness of Breath. 6.7 g 3    rosuvastatin (CRESTOR) 10 MG Tab  "Take 1 Tablet by mouth at bedtime. 90 Tablet 2    ADVAIR DISKUS 500-50 MCG/DOSE AEROSOL POWDER, BREATH ACTIVATED INHALE 1 PUFF EVERY 12 HOURS. 1 Each 11    progesterone (PROMETRIUM) 100 MG Cap       estradiol (CLIMARA) 0.0375 MG/24HR patch Place 1 Patch on the skin every 7 days. 12 Patch 3    Magnesium 500 MG Cap Take  by mouth.      RESTASIS 0.05 % ophthalmic emulsion       Spacer/Aero Chamber Mouthpiece Misc 1 Device by Does not apply route as needed. 1 Device 0    cetirizine (ZYRTEC) 10 MG TABS Take 1 Tablet by mouth every day.      vitamin D (CHOLECALCIFEROL) 1000 UNIT TABS Take 2 Tablets by mouth every day.      Multiple Vitamins-Minerals (OCUVITE PO) Take 1 Tab by mouth every evening.       No current Epic-ordered facility-administered medications on file.     Miacalcin [kdc:calcitonin+chlorobutanol], Simvastatin, and Sulfa drugs    PMH/PSH/FH/Social history reviewed.  Vaccinations discussed.  Previous records and labs reviewed. Discussed age appropriate anticipatory guidance.    ROS: see hpi  Gen: no fevers/chills  Pulm: no sob, no cough  CV: no chest pain, no palpitations, no edema  GI: no nausea/vomiting, no diarrhea  Skin: no rash    Objective:   Exam:  BP (!) 136/90 (BP Location: Left arm, Patient Position: Sitting, BP Cuff Size: Adult)   Pulse 69   Temp 37.2 °C (98.9 °F) (Temporal)   Resp 17   Ht 1.702 m (5' 7\")   Wt 86.2 kg (190 lb)   LMP 06/29/2019 (Approximate)   SpO2 96%   BMI 29.76 kg/m²    Body mass index is 29.76 kg/m².    Gen: Alert and oriented, No apparent distress.  HEENT: Head atraumatic, normocephalic. Pupils equal and round.  Neck: Neck is supple without lymphadenopathy.   Lungs: Normal effort, CTA bilaterally, no wheezes, rhonchi, or rales  CV: Regular rate and rhythm. No murmurs, rubs, or gallops.  ABD: +BS. Non-tender, non-distended. No rebound, rigidity, or guarding.  Ext: No clubbing, cyanosis, edema.    Assessment & Plan:     57 y.o. female with the following -     1. " Dyspepsia  Subacute, persistent.  Suspect gastritis versus PUD from chronic NSAID use.  We will start omeprazole 40 mg daily, reassess in 2 weeks.  Abdominal ultrasound for completeness.  - omeprazole (PRILOSEC) 40 MG delayed-release capsule; Take 1 Capsule by mouth every day.  Dispense: 30 Capsule; Refill: 0  - US-ABDOMEN COMPLETE SURVEY; Future    2. Joint pain in both hands  Chronic, uncontrolled.  Tramadol as needed for severe pain only.  Follow-up with orthopedics as scheduled.  - JENNIFER REFLEXIVE PROFILE; Future  - RHEUMATOID FACTOR QUANT; Future  - CCP ANTIBODY; Future  - CRP QUANTITIVE (NON-CARDIAC); Future  - HOMOCYSTEINE; Future  - Sed Rate; Future  - traMADol (ULTRAM) 50 MG Tab; Take 1 Tablet by mouth at bedtime as needed for Severe Pain for up to 30 days.  Dispense: 30 Tablet; Refill: 0  - URIC ACID; Future  - URINE DRUG SCREEN; Future  - Consent for Opiate Prescription    3. Mixed hyperlipidemia  Chronic, controlled and stable. Continue current regimen.  Start co-Q10 with Crestor for muscle cramping.  - Lipid Profile; Future    4. Other fatigue  Rule out sleep apnea  - Referral to Pulmonary and Sleep Medicine    5. Moderate persistent asthma without complication  Chronic, controlled and stable. Continue current regimen.     6. HSV-2 (herpes simplex virus 2) infection  Chronic, managed by gynecology.  On Valtrex 500 mg daily.    7. Vitamin D deficiency  - VITAMIN D,25 HYDROXY (DEFICIENCY); Future    8. Encounter for vitamin deficiency screening  - VITAMIN D,25 HYDROXY (DEFICIENCY); Future  - VITAMIN B12; Future    9. Screening for thyroid disorder  - TSH WITH REFLEX TO FT4; Future    10. Screening for deficiency anemia  - CBC WITH DIFFERENTIAL; Future    11. Diabetes mellitus screening  - Comp Metabolic Panel; Future      Return in about 2 weeks (around 11/24/2022) for Follow-up labs/tests.    Ema Lee PA-C (Baker)  Physician Assistant Certified  [facility]    Please note that this  dictation was created using voice recognition software. I have made every reasonable attempt to correct obvious errors, but I expect that there are errors of grammar and possibly content that I did not discover before finalizing the note.

## 2022-11-11 DIAGNOSIS — J45.40 MODERATE PERSISTENT ASTHMA WITHOUT COMPLICATION: ICD-10-CM

## 2022-11-11 RX ORDER — MONTELUKAST SODIUM 10 MG/1
10 TABLET ORAL DAILY
Qty: 90 TABLET | Refills: 3 | Status: SHIPPED | OUTPATIENT
Start: 2022-11-11 | End: 2023-10-03 | Stop reason: SDUPTHER

## 2022-11-11 NOTE — ASSESSMENT & PLAN NOTE
Patient admits to chronic joint pain in both of her hands with osteoarthritis.  She states that she is managed by a provider at the [facility].  Wears braces on her hands daily and uses Voltaren gel.  Has had stomach upset and GI bleed from NSAIDs in the past.

## 2022-11-11 NOTE — ASSESSMENT & PLAN NOTE
Patient admits to upset stomach, indigestion, GERD and occasional diarrhea for the past several months.  She states it all started after taking chronic NSAIDs for her hand pain.  She states her symptoms have mildly improved since discontinuing.  Takes Pepcid.  Denies any melena or hematochezia.

## 2022-11-11 NOTE — PATIENT INSTRUCTIONS
Take Co-Q-10 with Crestor  Prevnar 20 at the pharmacy      It was a pleasure meeting with you today at [facility]!    Your medical history/records and medications were reviewed today.     Please review my practice information below:    If you have any prescription refill requests, please send them via [facility] or discuss with your provider at the start of your office visits. Please allow 3-5 business days for lab and testing review and you will be contacted via USEUM with those results, or if advised to make a follow up appointment regarding those results, then please do so.     Once resulted, your lab/test/imaging results will show up automatically in your MyChart. Please wait for my interpretation and recommendations prior to viewing your results to avoid any unnecessary confusion or misinterpretation. I will address all of the lab values that I interpret as abnormal and message you accordingly on your MyChart. I will always send you a message about your results even if they are normal. If you do not hear back from me within 5-7 business days after completing your tests, then please send me a message on USEUM so I can obtain your results (especially if you went to an outside lab or imaging center - [facility], [facility], etc).     If you have any additional questions or concerns beyond my interpretation of your results, please make an appointment with me to discuss in further detail.    Please only use the USEUM messaging system for questions regarding your most recent appointment or if advised to use otherwise (glucose or blood pressure reporting).     If you have any new problems or concerns, you must make an appointment to discuss. This includes any referral requests, lab requests (unless advised to notify me for pre-appt labs), medication side effects, or request for medication adjustments.     Please arrive 15 minutes prior to your appointment time to complete your check-in and intake with the  medical assistant.      Thank you,    Ema Lee PA-C (Baker)  Physician Assistant Certified  [facility]    -----------------------------------------------------------------    Attn: Patients of [facility]:    In an effort to continue to provide excellent and efficient care to our patients, it is vital that we continue to use our resources appropriately. With that, this is a reminder that [facility] is used for prescription refill requests, test results, virtual visits, and chart review only.     Any new questions, concerns/conditions, lab/imaging requests, medication adjustments, new prescriptions, or referral requests do require an appointment (virtually or in person), unless discussed otherwise at your most recent appointment.     Thank you for your understanding,    [facility]

## 2022-11-11 NOTE — ASSESSMENT & PLAN NOTE
Patient is to chronic fatigue and frequent awakenings at night.  She states that she has been told in the past that she likely has sleep apnea.  Requesting consultation.

## 2022-11-11 NOTE — ASSESSMENT & PLAN NOTE
Chronic, on Advair and Singulair. Uses albuterol as needed. Has seen a provider at [facility] - multiple environmental allergies s/p allergy shots.

## 2022-11-12 LAB
AMPHETAMINES UR QL: NEGATIVE NG/ML
BARBITURATES UR QL: NEGATIVE NG/ML
BENZODIAZ UR QL: NEGATIVE NG/ML
CANNABINOIDS UR QL SCN: NEGATIVE NG/ML
COCAINE UR QL: NEGATIVE NG/ML
DRUG SCREEN COMMENT UR-IMP: NORMAL
MDMA CTO UR SCN-MCNC: NEGATIVE NG/ML
METHADONE UR QL: NEGATIVE NG/ML
OPIATES UR QL: NEGATIVE NG/ML
OXYCODONE CTO UR SCN-MCNC: NEGATIVE NG/ML
PCP UR QL SCN: NEGATIVE NG/ML
PROPOXYPH UR QL: NEGATIVE NG/ML

## 2022-11-12 NOTE — TELEPHONE ENCOUNTER
Received request via: Pharmacy    Was the patient seen in the last year in this department? Yes    Does the patient have an active prescription (recently filled or refills available) for medication(s) requested? No    Does the patient have alf Plus and need 100 day supply (blood pressure, diabetes and cholesterol meds only)? Patient does not have SCP

## 2022-11-18 ENCOUNTER — OFFICE VISIT (OUTPATIENT)
Dept: SLEEP MEDICINE | Facility: MEDICAL CENTER | Age: 57
End: 2022-11-18
Payer: COMMERCIAL

## 2022-11-18 VITALS
HEART RATE: 72 BPM | RESPIRATION RATE: 16 BRPM | BODY MASS INDEX: 30.53 KG/M2 | OXYGEN SATURATION: 95 % | SYSTOLIC BLOOD PRESSURE: 128 MMHG | WEIGHT: 190 LBS | HEIGHT: 66 IN | DIASTOLIC BLOOD PRESSURE: 84 MMHG

## 2022-11-18 DIAGNOSIS — J36 TONSILLAR ABSCESS: ICD-10-CM

## 2022-11-18 DIAGNOSIS — G47.19 EXCESSIVE DAYTIME SLEEPINESS: ICD-10-CM

## 2022-11-18 DIAGNOSIS — R06.83 SNORING: ICD-10-CM

## 2022-11-18 DIAGNOSIS — G47.30 BREATHING-RELATED SLEEP DISORDER: ICD-10-CM

## 2022-11-18 PROCEDURE — 99204 OFFICE O/P NEW MOD 45 MIN: CPT | Performed by: PREVENTIVE MEDICINE

## 2022-11-18 ASSESSMENT — ENCOUNTER SYMPTOMS: TIME GOING TO BED: 10:00

## 2022-11-18 NOTE — PROGRESS NOTES
"CHIEF COMPLIANT: \"Establishing care\"   HISTORY OF PRESENT ILLNESS:  Kaley Dean is a 57 y.o.female who is here to establish care.     Doesn't qualify as a sleep study due to not being ready by a sleep doctor.   Kindra Shukla   Sleep study results:  TYPE: HST three nights  DATES: 3 night study 07/29/22, 07/30/22, 07/31/22  Diagnostic:JUAN A 31, 31, and 24, respectively   Diagnostic  Oxygen Connor: spent 38mins under 88%, 5hrs under 88%, and 2hrs 50mins under 88%, Connor all in low 70s.     EPWORTH 13/24 cw with EDS.     Sleep History: She reports that she is tired all the time. The dental study was the first time she has been told she has sleep apnea. She does snore in her sleep and has witnessed apneas. She also wakes up with resuscitative snores and with pounding heart. She also talks in her sleep. She has reactive airway disease due to allergies that become a problem at night during spring. She goes to bed at 10pm and wakes up at 445pm each day. She does have chronic neck issues, she had surgery to replace several discs in her cervical spine. She chronically fatigued. She is a former smoker and has two glasses of wine per week.        Significant comorbidities and modifying factors: see below     PAST MEDICAL HISTORY:  Past Medical History:   Diagnosis Date    Asthma 08/28/2018    with inhalers    Chickenpox     Depression     GERD (gastroesophageal reflux disease)     [place] measles     Hyperlipidemia     Influenza     Mumps     Pain 08/28/2018    hands, arms, shoulder and neck    Tonsillitis       PROBLEM LIST:  Patient Active Problem List    Diagnosis Date Noted    Dyspepsia 11/10/2022    HSV-2 (herpes simplex virus 2) infection 11/10/2022    Joint pain in both hands 11/10/2022    Other fatigue 11/10/2022    Trigger index finger of right hand 04/13/2022    Trigger ring finger of right hand 04/13/2022    Primary osteoarthritis of first carpometacarpal joint of right hand 10/18/2021    Primary osteoarthritis of " first carpometacarpal joint of left hand 10/18/2021    Vitamin D deficiency 2019    Personal history of colonic polyps 2016    Acne rosacea 2015    Bilateral dry eyes 2015    Menopause 2015    Hemorrhoids 2015    Allergic rhinitis 2015    Mixed hyperlipidemia 2015    Moderate persistent asthma without complication 2015    Decreased hearing of both ears 2015     PAST SOCIAL HISTORY:  Past Surgical History:   Procedure Laterality Date    CERVICAL DISK AND FUSION ANTERIOR N/A 09/10/2018    Procedure: CERVICAL DISK AND FUSION ANTERIOR- C5-7 WITH PLATE;  Surgeon: Wallace Zamora M.D.;  Location: SURGERY [facility];  Service: Neurosurgery    TUBAL COAGULATION LAPAROSCOPIC BILATERAL  1994    LAMINOTOMY       PAST FAMILY HISTORY:  Family History   Problem Relation Age of Onset    Hypertension Mother     Kidney Disease Mother     Respiratory Disease Mother         COPD    Stroke Mother     Cancer Father     Heart Disease Brother      SOCIAL HISTORY:  Social History     Socioeconomic History    Marital status:      Spouse name: Not on file    Number of children: Not on file    Years of education: Not on file    Highest education level: Some college, no degree   Occupational History    Not on file   Tobacco Use    Smoking status: Former     Packs/day: 2.00     Years: 12.00     Pack years: 24.00     Types: Cigarettes     Start date: 1975     Quit date: 1987     Years since quittin.9    Smokeless tobacco: Never   Vaping Use    Vaping Use: Never used   Substance and Sexual Activity    Alcohol use: Yes     Alcohol/week: 1.2 oz     Types: 2 Glasses of wine per week     Comment: 1-5 per week    Drug use: No    Sexual activity: Yes     Partners: Male     Birth control/protection: Surgical   Other Topics Concern    Not on file   Social History Narrative    Not on file     Social Determinants of Health     Financial Resource Strain: Low Risk      Difficulty of Paying Living Expenses: Not hard at all   Food Insecurity: No Food Insecurity    Worried About Running Out of Food in the Last Year: Never true    Ran Out of Food in the Last Year: Never true   Transportation Needs: No Transportation Needs    Lack of Transportation (Medical): No    Lack of Transportation (Non-Medical): No   Physical Activity: Sufficiently Active    Days of Exercise per Week: 3 days    Minutes of Exercise per Session: 50 min   Stress: Stress Concern Present    Feeling of Stress : To some extent   Social Connections: Moderately Isolated    Frequency of Communication with Friends and Family: Three times a week    Frequency of Social Gatherings with Friends and Family: Twice a week    Attends Christianity Services: Never    Active Member of Clubs or Organizations: No    Attends Club or Organization Meetings: Never    Marital Status:    Intimate Partner Violence: Not on file   Housing Stability: Low Risk     Unable to Pay for Housing in the Last Year: No    Number of Places Lived in the Last Year: 1    Unstable Housing in the Last Year: No     ALLERGIES: Miacalcin [kdc:calcitonin+chlorobutanol], Simvastatin, and Sulfa drugs  MEDICATIONS:  Current Outpatient Medications   Medication Sig Dispense Refill    montelukast (SINGULAIR) 10 MG Tab Take 1 Tablet by mouth every day. 90 Tablet 3    traMADol (ULTRAM) 50 MG Tab Take 1 Tablet by mouth at bedtime as needed for Severe Pain for up to 30 days. 30 Tablet 0    omeprazole (PRILOSEC) 40 MG delayed-release capsule Take 1 Capsule by mouth every day. 30 Capsule 0    Beclomethasone Dipropionate (QNASL) 80 MCG/ACT Aero Soln Administer 1 Spray into affected nostril(S) every day. 10.6 g 0    valACYclovir (VALTREX) 500 MG Tab TAKE ONE TABLET BY MOUTH TWICE DAILY FOR TEN DAYS.      tretinoin (RETIN-A) 0.1 % cream Apply topically to affected area at night. 45 g 3    clindamycin-benzoyl peroxide (BENZACLIN) gel Apply 1 Application topically 2 times a  "day. 50 g 3    citalopram (CELEXA) 20 MG Tab Take 1 Tablet by mouth every day. 90 Tablet 0    albuterol 108 (90 Base) MCG/ACT Aero Soln inhalation aerosol Inhale 2 Puffs every four hours as needed for Shortness of Breath. 6.7 g 3    rosuvastatin (CRESTOR) 10 MG Tab Take 1 Tablet by mouth at bedtime. 90 Tablet 2    ADVAIR DISKUS 500-50 MCG/DOSE AEROSOL POWDER, BREATH ACTIVATED INHALE 1 PUFF EVERY 12 HOURS. 1 Each 11    progesterone (PROMETRIUM) 100 MG Cap       estradiol (CLIMARA) 0.0375 MG/24HR patch Place 1 Patch on the skin every 7 days. 12 Patch 3    Magnesium 500 MG Cap Take  by mouth.      RESTASIS 0.05 % ophthalmic emulsion       Spacer/Aero Chamber Mouthpiece Misc 1 Device by Does not apply route as needed. 1 Device 0    cetirizine (ZYRTEC) 10 MG TABS Take 1 Tablet by mouth every day.      vitamin D (CHOLECALCIFEROL) 1000 UNIT TABS Take 2 Tablets by mouth every day.      Multiple Vitamins-Minerals (OCUVITE PO) Take 1 Tab by mouth every evening.       No current facility-administered medications for this visit.    \"CURRENT RX\"    REVIEW OF SYSTEMS:  Constitutional: Denies weight loss, endorses chronic daytime fatigue++  Eyes: Denies vision changes  Ears/Nose/Mouth/Throat: Denies rhinitis/nasal congestion   Cardiovascular: Denies chest pain, tightness, palpitations, swelling in legs/feet, difficulty breathing when lying down but gets better when sitting up.   Respiratory: Denies shortness of breath while awake,  Sleep: per HPI  Gastrointestinal: Endorses heartburn., possible ulcer  Genitourinary: REPORTS nocturia  Musculoskeletal: Endorse painful joints,  back pain.  hands  Neurological: Denies frequent headaches,weakness, dizziness.    PHYSICAL EXAM/VITALS:  /84 (BP Location: Left arm, Patient Position: Sitting, BP Cuff Size: Adult)   Pulse 72   Resp 16   Ht 1.676 m (5' 6\")   Wt 86.2 kg (190 lb)   LMP 06/29/2019 (Approximate)   SpO2 95%   BMI 30.67 kg/m²   Appearance: Well-nourished, " well-developed,  looks stated age, no acute distress  Eyes:   EOMI  ENMT: MASKED neck circumference is 14.5 and Mallampati 5   Neck: Supple, trachea midline  Respiratory effort:  No intercostal retractions or use of accessory muscles  Musculoskeletal:  Grossly normal; gait and station normal  Neurologic:  oriented to person, time, place, and purpose; judgement intact  Psychiatric:  No depression, anxiety, agitation      MEDICAL DECISION MAKING:  The medical record was reviewed as it pertains to this referral. This includes records from primary care,consultants notes, referral request, hospital records, labs and imaging. Any available diagnostic and titration nocturnal polysomnograms, home sleep apnea tests, continuous nocturnal oximetry results, multiple sleep latency tests, and recent compliance reports were reviewed with the patient.    ASSESSMENT/PLAN:  Kaley Dean is a 57 y.o.female who has a high pretest probability of having obstructive sleep apnea.       1. Snoring  - Overnight Home Sleep Study; Future    2. Excessive daytime sleepiness  - Overnight Home Sleep Study; Future    3. Breathing-related sleep disorder  - Overnight Home Sleep Study; Future    4. Tonsillar abscess  - Referral to ENT  Washout your nasal passages.  In a hot shower inhale the moist hot air to liquefy the mucus.  This may make you cough and that is to be expected.  Then close 1 nasal passage and spray the nasal saline generously in the other nasal passage and move your head up down and right to left and then blow out all of the mucus.  Do the same thing to the other side.  Repeat the process 1 more time daily.  This will eliminate the allergens collected during the day and will help keep your nasal passages clear from thick mucus.      The risks of untreated sleep apnea were discussed with the patient at length. Patients with GERARDO are at increased risk of cardiovascular disease including coronary artery disease, systemic arterial  "hypertension, pulmonary arterial hypertension, cardiac arrhythmias, and stroke. GERARDO patients have an increased risk of motor vehicle accidents, type 2 diabetes, chronic kidney disease, and non-alcoholic liver disease. The patient was advised to avoid driving a motor vehicle when drowsy.  Have advised the patient to follow up with the appropriate healthcare practitioners for all other medical problems and issues.    RETURN TO CLINIC: Return for 3 weeks after ss to discuss results -DR. Hampton.    My total time spent caring for the patient on the day of the encounter was 40 minutes. This includes time spent on a thorough chart review including other physician notes, all sleep studies, as well as critical labs and pulmonary and cardiac studies.  Additionally, it includes a thorough discussion of good sleep hygiene and stimulus control, as well as  the need for consistency in terms of sleep preparation and practice.    Please note that this dictation was created using voice recognition software.  I have made every reasonable attempt to correct obvious errors, I expect that there are errors of grammar and possibly content that I did not discover before finalizing this note.Answers submitted by the patient for this visit:  Sleep Center Questionnaire (Submitted on 11/18/2022)  Occupation :   Height: 5'6\"  Current weight: 180  6 months ago: 185  At age 18: 125  What is the reason for your visit today?: at home sleep study showed sleep apnea  Name of person referring you to the Sleep Center: Ema Lee  Have you ever been hospitalized?: Yes  Reason, year, and hospital in which you were hospitalized:: 2018 - Recovery from Disk replacement surgery  Have you ever had problems with anesthesia?: No  Have you experienced post-operative delirium?: No  Any complications with surgery?: No  What year did you receive your last Flu shot?: 2022  What year did you receive you last Pneumonia shot?: n/a  Have you had a TB " skin test? If so, please list the year and result:: 2008 neg  Have you had Allergy skin testing? If so, please list the year and result:: 2016 - many allergies' grass, jt brush,  etc.  Please briefly describe your sleep problem and how old you were when it began.: I am tired often, don't sleep soundly  How does this affect your daily life and activities? Please also rate how serious of a problem this is (1 = Not at all, 10 = Very Serious).: 5  Have you had any previous evaluations, examinations, or treatment for this sleep problem or any other problems with your sleep? If so, please describe the evaluation, treatment, and results.: at home sleep study - showed severe  Have you used any medications (prescribed or otherwise) to help your sleep problem? If yes, include name, amount, frequency, and the prescribing physician.: no  If employed, what time do you usually start and end work?: 7:00 am  Do you ever change work shifts? If yes, describe how often (never, infrequently, regularly).: no  What time do you usually go to bed and wake up on: Weekdays? Weekends?: 10:00  Do you have a regular bed partner?: Yes  How many minutes does it usually take to fall asleep at night after turning off the lights?: 5-20  What do you ordinarily do just prior to turning out the lights and attempting to go to sleep (e.g., reading, TV, baths, etc.)?: TV  On average, how many times do you wake up during the night?: 4  On average, how many times do you wake up to use the bathroom?: 0  Do you often wake up too early in the morning and are unable to return to sleep?: no  On average, how many hours of sleep do you get per night?: 7  How do you usually awaken?  Alarm, spontaneously, or other?: My  usually wakes me  Is it difficult for you to awaken and get out of bed after sleeping? (Not at all, Sometimes, Very): yes  Do you nap or return to bed after arising?: on weekends  Are you bothered by sleepiness during the day?: sometimes  Do  "you feel that you get too much sleep at night?: no  Do you feel that you get too little sleep at night?: yes  Do you usually feel tired during the day? If so, what do you attribute this to?: stress  Do you find yourself falling asleep when you don't mean to? : If a passenger in a car I have a hard time staying awake, when watching a movie  If yes, how long does your sleep episode last?: could be hours  Do you feel rested or refreshed after the sleep episode?: Yes  Have you ever suddenly fallen?: No  Have you ever experienced sudden body weakness?: yes  If yes, were you aware of the things around you?: Yes  Was the weakness brought on by any particular event or feeling? If so, briefly describe.: Sometimes I get a wave of sudden tiredness and I must go to sleep  Have you ever experienced weakness or paralysis upon going to sleep?: no  Have you ever experienced weakness or paralysis upon awakening from sleep?: no  Have you ever experienced seeing things or hearing voices/noises: That weren't real? On going to sleep? During the night? On awakening from sleep? During the day?: After the death of my sister in law during the day I felt I could see he hovering  Do you have difficulty breathing at night? If yes, briefly describe.: Snoring, having to swallow often  How many times per week?: 6-7  How did you become aware of this, at what age did this first occur, and how many years has this occurred?: I don't remember  Have you been told you snore while asleep? If so, does it disturb a bed partner (or someone in the same room), or someone in the next room?: Snore and talking in my sleep  Have you ever experienced doing something without being aware of the action? If yes, please describe.: no  Have you ever experienced upon lying in bed before sleep or on awakening from sleep: Restlessness of legs, \"nervous legs\", \"creeping crawling\" sensation of legs, or twitching of legs?: no  Have you ever been told that your arms or legs " jerk or twitch while you are asleep? If yes, how many times per night does this occur?: no  Do you know, or have you ever been told that you do any of the following while sleeping: talk, walk, grit teeth, wet the bed, wake up screaming or seemingly afraid, have disturbing dreams, have unusual movements, wake up with headaches, (males) have erections? If yes to any of these, please indicate how many times per week, age started, last occurrence, treatment received.: talk, grind teeth, vivid dreams - 4 times a week

## 2022-12-15 ENCOUNTER — HOSPITAL ENCOUNTER (OUTPATIENT)
Dept: LAB | Facility: MEDICAL CENTER | Age: 57
End: 2022-12-15
Attending: PHYSICIAN ASSISTANT
Payer: COMMERCIAL

## 2022-12-15 ENCOUNTER — HOSPITAL ENCOUNTER (OUTPATIENT)
Dept: RADIOLOGY | Facility: MEDICAL CENTER | Age: 57
End: 2022-12-15
Attending: PHYSICIAN ASSISTANT
Payer: COMMERCIAL

## 2022-12-15 DIAGNOSIS — Z13.21 ENCOUNTER FOR VITAMIN DEFICIENCY SCREENING: ICD-10-CM

## 2022-12-15 DIAGNOSIS — Z13.29 SCREENING FOR THYROID DISORDER: ICD-10-CM

## 2022-12-15 DIAGNOSIS — M25.542 JOINT PAIN IN BOTH HANDS: ICD-10-CM

## 2022-12-15 DIAGNOSIS — Z13.0 SCREENING FOR DEFICIENCY ANEMIA: ICD-10-CM

## 2022-12-15 DIAGNOSIS — E78.2 MIXED HYPERLIPIDEMIA: ICD-10-CM

## 2022-12-15 DIAGNOSIS — Z13.1 DIABETES MELLITUS SCREENING: ICD-10-CM

## 2022-12-15 DIAGNOSIS — E55.9 VITAMIN D DEFICIENCY: ICD-10-CM

## 2022-12-15 DIAGNOSIS — R10.13 DYSPEPSIA: ICD-10-CM

## 2022-12-15 DIAGNOSIS — M25.541 JOINT PAIN IN BOTH HANDS: ICD-10-CM

## 2022-12-15 LAB
25(OH)D3 SERPL-MCNC: 29 NG/ML (ref 30–100)
ALBUMIN SERPL BCP-MCNC: 4.2 G/DL (ref 3.2–4.9)
ALBUMIN/GLOB SERPL: 1.8 G/DL
ALP SERPL-CCNC: 106 U/L (ref 30–99)
ALT SERPL-CCNC: 74 U/L (ref 2–50)
ANION GAP SERPL CALC-SCNC: 9 MMOL/L (ref 7–16)
AST SERPL-CCNC: 44 U/L (ref 12–45)
BASOPHILS # BLD AUTO: 1.3 % (ref 0–1.8)
BASOPHILS # BLD: 0.09 K/UL (ref 0–0.12)
BILIRUB SERPL-MCNC: 0.5 MG/DL (ref 0.1–1.5)
BUN SERPL-MCNC: 28 MG/DL (ref 8–22)
CALCIUM ALBUM COR SERPL-MCNC: 9.3 MG/DL (ref 8.5–10.5)
CALCIUM SERPL-MCNC: 9.5 MG/DL (ref 8.5–10.5)
CHLORIDE SERPL-SCNC: 106 MMOL/L (ref 96–112)
CHOLEST SERPL-MCNC: 298 MG/DL (ref 100–199)
CO2 SERPL-SCNC: 29 MMOL/L (ref 20–33)
CREAT SERPL-MCNC: 0.67 MG/DL (ref 0.5–1.4)
CRP SERPL HS-MCNC: <0.3 MG/DL (ref 0–0.75)
EOSINOPHIL # BLD AUTO: 0.43 K/UL (ref 0–0.51)
EOSINOPHIL NFR BLD: 6.2 % (ref 0–6.9)
ERYTHROCYTE [DISTWIDTH] IN BLOOD BY AUTOMATED COUNT: 43.6 FL (ref 35.9–50)
FASTING STATUS PATIENT QL REPORTED: NORMAL
GFR SERPLBLD CREATININE-BSD FMLA CKD-EPI: 100 ML/MIN/1.73 M 2
GLOBULIN SER CALC-MCNC: 2.4 G/DL (ref 1.9–3.5)
GLUCOSE SERPL-MCNC: 107 MG/DL (ref 65–99)
HCT VFR BLD AUTO: 47.3 % (ref 37–47)
HCYS SERPL-SCNC: 12.4 UMOL/L
HDLC SERPL-MCNC: 47 MG/DL
HGB BLD-MCNC: 15.2 G/DL (ref 12–16)
IMM GRANULOCYTES # BLD AUTO: 0.1 K/UL (ref 0–0.11)
IMM GRANULOCYTES NFR BLD AUTO: 1.5 % (ref 0–0.9)
LDLC SERPL CALC-MCNC: 186 MG/DL
LYMPHOCYTES # BLD AUTO: 1.78 K/UL (ref 1–4.8)
LYMPHOCYTES NFR BLD: 25.8 % (ref 22–41)
MCH RBC QN AUTO: 30.2 PG (ref 27–33)
MCHC RBC AUTO-ENTMCNC: 32.1 G/DL (ref 33.6–35)
MCV RBC AUTO: 93.8 FL (ref 81.4–97.8)
MONOCYTES # BLD AUTO: 0.58 K/UL (ref 0–0.85)
MONOCYTES NFR BLD AUTO: 8.4 % (ref 0–13.4)
NEUTROPHILS # BLD AUTO: 3.91 K/UL (ref 2–7.15)
NEUTROPHILS NFR BLD: 56.8 % (ref 44–72)
NRBC # BLD AUTO: 0 K/UL
NRBC BLD-RTO: 0 /100 WBC
PLATELET # BLD AUTO: 255 K/UL (ref 164–446)
PMV BLD AUTO: 9.7 FL (ref 9–12.9)
POTASSIUM SERPL-SCNC: 4.3 MMOL/L (ref 3.6–5.5)
PROT SERPL-MCNC: 6.6 G/DL (ref 6–8.2)
RBC # BLD AUTO: 5.04 M/UL (ref 4.2–5.4)
RHEUMATOID FACT SER IA-ACNC: <10 IU/ML (ref 0–14)
SODIUM SERPL-SCNC: 144 MMOL/L (ref 135–145)
TRIGL SERPL-MCNC: 326 MG/DL (ref 0–149)
TSH SERPL DL<=0.005 MIU/L-ACNC: 1.69 UIU/ML (ref 0.38–5.33)
URATE SERPL-MCNC: 5.7 MG/DL (ref 1.9–8.2)
VIT B12 SERPL-MCNC: 1121 PG/ML (ref 211–911)
WBC # BLD AUTO: 6.9 K/UL (ref 4.8–10.8)

## 2022-12-15 PROCEDURE — 83090 ASSAY OF HOMOCYSTEINE: CPT

## 2022-12-15 PROCEDURE — 86140 C-REACTIVE PROTEIN: CPT

## 2022-12-15 PROCEDURE — 80053 COMPREHEN METABOLIC PANEL: CPT

## 2022-12-15 PROCEDURE — 86200 CCP ANTIBODY: CPT

## 2022-12-15 PROCEDURE — 85652 RBC SED RATE AUTOMATED: CPT

## 2022-12-15 PROCEDURE — 82607 VITAMIN B-12: CPT

## 2022-12-15 PROCEDURE — 80061 LIPID PANEL: CPT

## 2022-12-15 PROCEDURE — 82306 VITAMIN D 25 HYDROXY: CPT

## 2022-12-15 PROCEDURE — 36415 COLL VENOUS BLD VENIPUNCTURE: CPT

## 2022-12-15 PROCEDURE — 86431 RHEUMATOID FACTOR QUANT: CPT

## 2022-12-15 PROCEDURE — 76700 US EXAM ABDOM COMPLETE: CPT

## 2022-12-15 PROCEDURE — 86038 ANTINUCLEAR ANTIBODIES: CPT

## 2022-12-15 PROCEDURE — 84443 ASSAY THYROID STIM HORMONE: CPT

## 2022-12-15 PROCEDURE — 84550 ASSAY OF BLOOD/URIC ACID: CPT

## 2022-12-15 PROCEDURE — 85025 COMPLETE CBC W/AUTO DIFF WBC: CPT

## 2022-12-16 LAB — ERYTHROCYTE [SEDIMENTATION RATE] IN BLOOD BY WESTERGREN METHOD: 5 MM/HOUR (ref 0–25)

## 2022-12-17 LAB
CCP IGG SERPL-ACNC: 5 UNITS (ref 0–19)
NUCLEAR IGG SER QL IA: NORMAL

## 2022-12-19 DIAGNOSIS — R10.13 DYSPEPSIA: ICD-10-CM

## 2022-12-19 RX ORDER — OMEPRAZOLE 40 MG/1
40 CAPSULE, DELAYED RELEASE ORAL DAILY
Qty: 90 CAPSULE | Refills: 3 | Status: SHIPPED | OUTPATIENT
Start: 2022-12-19 | End: 2023-10-02 | Stop reason: SDUPTHER

## 2022-12-30 ENCOUNTER — TELEMEDICINE (OUTPATIENT)
Dept: MEDICAL GROUP | Facility: IMAGING CENTER | Age: 57
End: 2022-12-30
Payer: COMMERCIAL

## 2022-12-30 VITALS — WEIGHT: 189 LBS | TEMPERATURE: 102 F | BODY MASS INDEX: 30.37 KG/M2 | HEIGHT: 66 IN | OXYGEN SATURATION: 92 %

## 2022-12-30 DIAGNOSIS — K76.0 HEPATIC STEATOSIS: ICD-10-CM

## 2022-12-30 DIAGNOSIS — R74.01 TRANSAMINITIS: ICD-10-CM

## 2022-12-30 DIAGNOSIS — U07.1 COVID: ICD-10-CM

## 2022-12-30 DIAGNOSIS — R05.1 ACUTE COUGH: ICD-10-CM

## 2022-12-30 DIAGNOSIS — E78.2 MIXED HYPERLIPIDEMIA: ICD-10-CM

## 2022-12-30 PROCEDURE — 99214 OFFICE O/P EST MOD 30 MIN: CPT | Mod: 95 | Performed by: PHYSICIAN ASSISTANT

## 2022-12-30 RX ORDER — PROMETHAZINE HYDROCHLORIDE AND CODEINE PHOSPHATE 6.25; 1 MG/5ML; MG/5ML
5 SYRUP ORAL 4 TIMES DAILY PRN
Qty: 120 ML | Refills: 0 | Status: SHIPPED | OUTPATIENT
Start: 2022-12-30 | End: 2023-01-02

## 2022-12-30 RX ORDER — PRAVASTATIN SODIUM 40 MG
40 TABLET ORAL NIGHTLY
Qty: 90 TABLET | Refills: 1 | Status: SHIPPED | OUTPATIENT
Start: 2022-12-30 | End: 2024-02-23 | Stop reason: SDUPTHER

## 2022-12-30 ASSESSMENT — PAIN SCALES - GENERAL: PAINLEVEL: NO PAIN

## 2022-12-30 ASSESSMENT — FIBROSIS 4 INDEX: FIB4 SCORE: 1.18

## 2022-12-30 NOTE — ASSESSMENT & PLAN NOTE
Chronic, uncontrolled.  Noncompliant with Crestor due to muscle cramping.  Had not been taking it prior to lab draw.

## 2022-12-30 NOTE — ASSESSMENT & PLAN NOTE
Noted in labs, patient states that she had been sick for 2 weeks before her labs.  Imaging shows hepatic steatosis.

## 2022-12-30 NOTE — ASSESSMENT & PLAN NOTE
Pt c/o cough, body aches, fatigue, and fever x 1 day. Tested positive for covid this morning.  Patient with frequent coughing fits throughout the day.  Has albuterol.  History of asthma, on Advair and Singulair.  Taking Tylenol for fever.  Has had all of her COVID boosters.

## 2022-12-30 NOTE — PROGRESS NOTES
Virtual Visit: Established Patient   This visit was conducted via Zoom using secure and encrypted videoconferencing technology. The patient was in a private location in the state of [place].    The patient's identity was confirmed and verbal consent was obtained for this virtual visit.    Subjective:   CC:   Chief Complaint   Patient presents with    Lab Results    Results    Other     Positive COVID    Chills    Fever     102 this morning     Body Aches       Kaley Dean is a 57 y.o. female presenting for evaluation and management of:    COVID  Pt c/o cough, body aches, fatigue, and fever x 1 day. Tested positive for covid this morning.  Patient with frequent coughing fits throughout the day.  Has albuterol.  History of asthma, on Advair and Singulair.  Taking Tylenol for fever.  Has had all of her COVID boosters.    Transaminitis  Noted in labs, patient states that she had been sick for 2 weeks before her labs.  Imaging shows hepatic steatosis.    Mixed hyperlipidemia  Chronic, uncontrolled.  Noncompliant with Crestor due to muscle cramping.  Had not been taking it prior to lab draw.      Allergies   Allergen Reactions    Miacalcin [Kdc:Calcitonin+Chlorobutanol]      Skin turns RED.    Simvastatin      Muscle cramps    Sulfa Drugs Hives     swelling       Current medicines (including changes today)  Current Outpatient Medications   Medication Sig Dispense Refill    Nirmatrelvir&Ritonavir 300/100 20 x 150 MG & 10 x 100MG Tablet Therapy Pack Take 300 mg nirmatrelvir (two 150 mg tablets) with 100 mg ritonavir (one 100 mg tablet) by mouth, with all three tablets taken together twice daily for 5 days. 30 Each 0    pravastatin (PRAVACHOL) 40 MG tablet Take 1 Tablet by mouth every evening. 90 Tablet 1    promethazine-codeine (PHENERGAN-CODEINE) 6.25-10 MG/5ML Syrup Take 5 mL by mouth 4 times a day as needed for Cough for up to 3 days. 120 mL 0    omeprazole (PRILOSEC) 40 MG delayed-release capsule Take 1 Capsule by  mouth every day. 90 Capsule 3    montelukast (SINGULAIR) 10 MG Tab Take 1 Tablet by mouth every day. 90 Tablet 3    Beclomethasone Dipropionate (QNASL) 80 MCG/ACT Aero Soln Administer 1 Spray into affected nostril(S) every day. 10.6 g 0    valACYclovir (VALTREX) 500 MG Tab TAKE ONE TABLET BY MOUTH TWICE DAILY FOR TEN DAYS.      tretinoin (RETIN-A) 0.1 % cream Apply topically to affected area at night. 45 g 3    clindamycin-benzoyl peroxide (BENZACLIN) gel Apply 1 Application topically 2 times a day. 50 g 3    citalopram (CELEXA) 20 MG Tab Take 1 Tablet by mouth every day. 90 Tablet 0    albuterol 108 (90 Base) MCG/ACT Aero Soln inhalation aerosol Inhale 2 Puffs every four hours as needed for Shortness of Breath. 6.7 g 3    ADVAIR DISKUS 500-50 MCG/DOSE AEROSOL POWDER, BREATH ACTIVATED INHALE 1 PUFF EVERY 12 HOURS. 1 Each 11    progesterone (PROMETRIUM) 100 MG Cap       estradiol (CLIMARA) 0.0375 MG/24HR patch Place 1 Patch on the skin every 7 days. 12 Patch 3    Magnesium 500 MG Cap Take  by mouth.      RESTASIS 0.05 % ophthalmic emulsion       Spacer/Aero Chamber Mouthpiece Misc 1 Device by Does not apply route as needed. 1 Device 0    cetirizine (ZYRTEC) 10 MG TABS Take 1 Tablet by mouth every day.      vitamin D (CHOLECALCIFEROL) 1000 UNIT TABS Take 2 Tablets by mouth every day.      Multiple Vitamins-Minerals (OCUVITE PO) Take 1 Tab by mouth every evening.       No current facility-administered medications for this visit.       Patient Active Problem List    Diagnosis Date Noted    Hepatic steatosis 12/30/2022    Transaminitis 12/30/2022    COVID 12/30/2022    Dyspepsia 11/10/2022    HSV-2 (herpes simplex virus 2) infection 11/10/2022    Joint pain in both hands 11/10/2022    Other fatigue 11/10/2022    Trigger index finger of right hand 04/13/2022    Trigger ring finger of right hand 04/13/2022    Primary osteoarthritis of first carpometacarpal joint of right hand 10/18/2021    Primary osteoarthritis of first  "carpometacarpal joint of left hand 10/18/2021    Vitamin D deficiency 11/12/2019    Personal history of colonic polyps 09/26/2016    Acne rosacea 04/24/2015    Bilateral dry eyes 01/16/2015    Menopause 01/13/2015    Hemorrhoids 01/13/2015    Allergic rhinitis 01/13/2015    Mixed hyperlipidemia 01/13/2015    Moderate persistent asthma without complication 01/13/2015    Decreased hearing of both ears 01/13/2015       Family History   Problem Relation Age of Onset    Hypertension Mother     Kidney Disease Mother     Respiratory Disease Mother         COPD    Stroke Mother     Cancer Father     Heart Disease Brother        She  has a past medical history of Asthma (08/28/2018), Chickenpox, Depression, GERD (gastroesophageal reflux disease), Japanese measles, Hyperlipidemia, Influenza, Mumps, Pain (08/28/2018), and Tonsillitis.  She  has a past surgical history that includes tubal coagulation laparoscopic bilateral (01/01/1994); cervical disk and fusion anterior (N/A, 09/10/2018); and laminotomy.         Objective:   Temp (!) 38.9 °C (102 °F) (Temporal)   Ht 1.676 m (5' 6\")   Wt 85.7 kg (189 lb)   LMP 06/29/2019 (Approximate)   SpO2 92%   BMI 30.51 kg/m²   RR 12    Physical Exam:  Constitutional: Alert, frequent coughing during visit., well-groomed.  Skin: No rashes in visible areas.  Eye: Round. Conjunctiva clear, lids normal. No icterus.   ENMT: Lips pink without lesions, good dentition, moist mucous membranes. Phonation normal.  Neck: No masses, no thyromegaly. Moves freely without pain.  Respiratory: Unlabored respiratory effort, no cough or audible wheeze  Psych: Alert and oriented x3, normal affect and mood.     Assessment and Plan:   The following treatment plan was discussed:     1. COVID  According to the latest CDC guidelines you should isolate yourself (regardless of vaccination status):   · For a minimum of 5 days for mild to moderate disease and for at least 10 days for severe disease  · Until at least " 24 hours have passed since your last fever without the use of fever-reducing medications and   · Until all other symptoms have improved.    Regardless of your symptoms, wear a well-fitted mask for 10 days anytime you are around others    Please contact anybody that you have been in contact with so that they can take the proper precautions and discuss with their primary care provider.    Please contact our office if you have any questions or if your symptoms worsen.  If you develop any respiratory compromise please go to the emergency department immediately.    I would recommend increasing fluids (warm tea +/-honey, water, chicken noodle soup), frequent handwashing, and rest.    Use a coolmist humidifier and warm steamy showers/baths to help with congestion.  Please start over-the-counter vitamin D, vitamin C, and zinc.    In regards to over-the-counter medications -    -For sinus or nasal congestion, please use a nasal steroid spray such as Flonase or Nasacort and an antihistamine +/- decongestant.    -If you use Afrin do not use it for more than 3 days at a time as it can cause rebound congestion.   -For any sore throat symptoms, I would recommend salt water gargles and alternate acetaminophen and ibuprofen.   -For cough, please take guaifenesin DM and any medications that may have been prescribed at today's visit   -For fever, chills, body aches, please alternate acetaminophen and ibuprofen every 6-8 hours.  Take ibuprofen with food to avoid stomach upset.  If your respiratory symptoms worsen, please seek immediate medical treatment in the emergency department.  Prescription for Paxlovid sent to pharmacy.  Side effects such as diarrhea discussed with patient.  Medications reviewed.  Discussed criteria for emergency use authorization.  Hold statin while on medication.  - Nirmatrelvir&Ritonavir 300/100 20 x 150 MG & 10 x 100MG Tablet Therapy Pack; Take 300 mg nirmatrelvir (two 150 mg tablets) with 100 mg ritonavir  (one 100 mg tablet) by mouth, with all three tablets taken together twice daily for 5 days.  Dispense: 30 Each; Refill: 0  - promethazine-codeine (PHENERGAN-CODEINE) 6.25-10 MG/5ML Syrup; Take 5 mL by mouth 4 times a day as needed for Cough for up to 3 days.  Dispense: 120 mL; Refill: 0    2. Acute cough  Do not drink alcohol or drive while on this medication.  - promethazine-codeine (PHENERGAN-CODEINE) 6.25-10 MG/5ML Syrup; Take 5 mL by mouth 4 times a day as needed for Cough for up to 3 days.  Dispense: 120 mL; Refill: 0    3. Hepatic steatosis  Noted on imaging, Please continue working on lifestyle modifications. This includes accumulation of 150 minutes or more of moderate-intensity activity each week as tolerated and a healthy diet that is low in saturated fat, sodium, and cholesterol, such as the mediterranean diet that is typically high in fruits, vegetables, whole grains, beans, nuts, and seeds, includes olive oil as an important source of monounsaturated fat, DASH diet, and/or also consider a plant-based diet.  Please also increase your dietary fiber intake to 25 to 30 g a day.    4. Transaminitis  Recheck in 4 to 6 weeks.  - Comp Metabolic Panel; Future    5. Mixed hyperlipidemia  Recheck in 4 to 6 weeks after being on pravastatin.  Take with co-Q10.  - Lipid Profile; Future  - pravastatin (PRAVACHOL) 40 MG tablet; Take 1 Tablet by mouth every evening.  Dispense: 90 Tablet; Refill: 1    Follow-up: Return for Will notify patient to follow-up pending tests.         Ema Lee PA-C (Baker)  Physician Assistant Certified  [facility]    Please note that this dictation was created using voice recognition software. I have made every reasonable attempt to correct obvious errors, but I expect that there are errors of grammar and possibly content that I did not discover before finalizing the note.

## 2023-01-19 ENCOUNTER — TELEMEDICINE (OUTPATIENT)
Dept: MEDICAL GROUP | Facility: IMAGING CENTER | Age: 58
End: 2023-01-19
Payer: COMMERCIAL

## 2023-01-19 VITALS
OXYGEN SATURATION: 94 % | BODY MASS INDEX: 29.57 KG/M2 | WEIGHT: 184 LBS | HEART RATE: 73 BPM | TEMPERATURE: 97.7 F | HEIGHT: 66 IN

## 2023-01-19 DIAGNOSIS — J01.10 ACUTE NON-RECURRENT FRONTAL SINUSITIS: ICD-10-CM

## 2023-01-19 PROBLEM — U07.1 COVID: Status: RESOLVED | Noted: 2022-12-30 | Resolved: 2023-01-19

## 2023-01-19 PROBLEM — J34.89 SINUS PRESSURE: Status: ACTIVE | Noted: 2023-01-19

## 2023-01-19 PROCEDURE — 99213 OFFICE O/P EST LOW 20 MIN: CPT | Mod: 95 | Performed by: PHYSICIAN ASSISTANT

## 2023-01-19 RX ORDER — DOXYCYCLINE HYCLATE 100 MG
100 TABLET ORAL 2 TIMES DAILY
Qty: 20 TABLET | Refills: 0 | Status: SHIPPED | OUTPATIENT
Start: 2023-01-19 | End: 2023-02-07

## 2023-01-19 ASSESSMENT — PATIENT HEALTH QUESTIONNAIRE - PHQ9: CLINICAL INTERPRETATION OF PHQ2 SCORE: 0

## 2023-01-19 ASSESSMENT — FIBROSIS 4 INDEX: FIB4 SCORE: 1.18

## 2023-01-19 NOTE — ASSESSMENT & PLAN NOTE
Pt admits to sinus pressure, thick green nasal drainage, post nasal drip, and headache since having COVID 3 weeks ago. Symptoms feel similar to previous sinus infections.  Has been taking intermittent decongestants.  Chronically on Zyrtec, Qnasl, Singulair.  No fever, chills.

## 2023-01-19 NOTE — PATIENT INSTRUCTIONS
It was a pleasure meeting with you today at [facility]!    Your medical history/records and medications were reviewed today.     Please review my practice information below:    If you have any prescription refill requests, please send them via MemoryBist[employer] or discuss with your provider at the start of your office visits. Please allow 3-5 business days for lab and testing review and you will be contacted via Remotium with those results, or if advised to make a follow up appointment regarding those results, then please do so.     Once resulted, your lab/test/imaging results will show up automatically in your MyChart. Please wait for my interpretation and recommendations prior to viewing your results to avoid any unnecessary confusion or misinterpretation. I will address all of the lab values that I interpret as abnormal and message you accordingly on your MyChart. I will always send you a message about your results even if they are normal. If you do not hear back from me within 5-7 business days after completing your tests, then please send me a message on MemoryBistrot so I can obtain your results (especially if you went to an outside lab or imaging center - [facility], [facility], etc).     If you have any additional questions or concerns beyond my interpretation of your results, please make an appointment with me to discuss in further detail.    Please only use the Remotium messaging system for questions regarding your most recent appointment or if advised to use otherwise (glucose or blood pressure reporting).     If you have any new problems or concerns, you must make an appointment to discuss. This includes any referral requests, lab requests (unless advised to notify me for pre-appt labs), medication side effects, or request for medication adjustments.     Please arrive 15 minutes prior to your appointment time to complete your check-in and intake with the medical assistant.      Thank you,    mEa Bolton) Jesus  JOSE  Physician Assistant Certified  [facility]    -----------------------------------------------------------------    Attn: Patients of [facility]:    In an effort to continue to provide excellent and efficient care to our patients, it is vital that we continue to use our resources appropriately. With that, this is a reminder that [employer] is used for prescription refill requests, test results, virtual visits, and chart review only.     Any new questions, concerns/conditions, lab/imaging requests, medication adjustments, new prescriptions, or referral requests do require an appointment (virtually or in person), unless discussed otherwise at your most recent appointment.     Thank you for your understanding,    [facility]

## 2023-01-19 NOTE — PROGRESS NOTES
Virtual Visit: Established Patient   This visit was conducted via Zoom using secure and encrypted videoconferencing technology. The patient was in a private location in the state of [place].    The patient's identity was confirmed and verbal consent was obtained for this virtual visit.    Subjective:   CC:   Chief Complaint   Patient presents with    Follow-Up     Covid follow up, thinks she has sinus infection now    Sinusitis    Nasal Congestion       Kaley Dean is a 57 y.o. female presenting for evaluation and management of:    Sinus pressure  Pt admits to sinus pressure, thick green nasal drainage, post nasal drip, and headache since having COVID 3 weeks ago. Symptoms feel similar to previous sinus infections.  Has been taking intermittent decongestants.  Chronically on Zyrtec, Qnasl, Singulair.  No fever, chills.    Depression Screening    Little interest or pleasure in doing things?  0 - not at all  Feeling down, depressed , or hopeless? 0 - not at all  Patient Health Questionnaire Score: 0    ROS   Denies any recent fevers or chills. No nausea or vomiting. No chest pains or shortness of breath.     Allergies   Allergen Reactions    Miacalcin [Kdc:Calcitonin+Chlorobutanol]      Skin turns RED.    Simvastatin      Muscle cramps    Sulfa Drugs Hives     swelling       Current medicines (including changes today)  Current Outpatient Medications   Medication Sig Dispense Refill    doxycycline (VIBRAMYCIN) 100 MG Tab Take 1 Tablet by mouth 2 times a day. 20 Tablet 0    pravastatin (PRAVACHOL) 40 MG tablet Take 1 Tablet by mouth every evening. 90 Tablet 1    omeprazole (PRILOSEC) 40 MG delayed-release capsule Take 1 Capsule by mouth every day. 90 Capsule 3    montelukast (SINGULAIR) 10 MG Tab Take 1 Tablet by mouth every day. 90 Tablet 3    Beclomethasone Dipropionate (QNASL) 80 MCG/ACT Aero Soln Administer 1 Spray into affected nostril(S) every day. 10.6 g 0    tretinoin (RETIN-A) 0.1 % cream Apply topically  to affected area at night. 45 g 3    clindamycin-benzoyl peroxide (BENZACLIN) gel Apply 1 Application topically 2 times a day. 50 g 3    citalopram (CELEXA) 20 MG Tab Take 1 Tablet by mouth every day. 90 Tablet 0    albuterol 108 (90 Base) MCG/ACT Aero Soln inhalation aerosol Inhale 2 Puffs every four hours as needed for Shortness of Breath. 6.7 g 3    ADVAIR DISKUS 500-50 MCG/DOSE AEROSOL POWDER, BREATH ACTIVATED INHALE 1 PUFF EVERY 12 HOURS. 1 Each 11    progesterone (PROMETRIUM) 100 MG Cap       estradiol (CLIMARA) 0.0375 MG/24HR patch Place 1 Patch on the skin every 7 days. 12 Patch 3    Magnesium 500 MG Cap Take  by mouth.      RESTASIS 0.05 % ophthalmic emulsion       Spacer/Aero Chamber Mouthpiece Misc 1 Device by Does not apply route as needed. 1 Device 0    cetirizine (ZYRTEC) 10 MG TABS Take 1 Tablet by mouth every day.      vitamin D (CHOLECALCIFEROL) 1000 UNIT TABS Take 2 Tablets by mouth every day.      Multiple Vitamins-Minerals (OCUVITE PO) Take 1 Tab by mouth every evening.      valACYclovir (VALTREX) 500 MG Tab TAKE ONE TABLET BY MOUTH TWICE DAILY FOR TEN DAYS. (Patient not taking: Reported on 1/19/2023)       No current facility-administered medications for this visit.       Patient Active Problem List    Diagnosis Date Noted    Sinus pressure 01/19/2023    Hepatic steatosis 12/30/2022    Transaminitis 12/30/2022    Dyspepsia 11/10/2022    HSV-2 (herpes simplex virus 2) infection 11/10/2022    Joint pain in both hands 11/10/2022    Other fatigue 11/10/2022    Trigger index finger of right hand 04/13/2022    Trigger ring finger of right hand 04/13/2022    Primary osteoarthritis of first carpometacarpal joint of right hand 10/18/2021    Primary osteoarthritis of first carpometacarpal joint of left hand 10/18/2021    Vitamin D deficiency 11/12/2019    Personal history of colonic polyps 09/26/2016    Acne rosacea 04/24/2015    Bilateral dry eyes 01/16/2015    Menopause 01/13/2015    Hemorrhoids  "01/13/2015    Allergic rhinitis 01/13/2015    Mixed hyperlipidemia 01/13/2015    Moderate persistent asthma without complication 01/13/2015    Decreased hearing of both ears 01/13/2015       Family History   Problem Relation Age of Onset    Hypertension Mother     Kidney Disease Mother     Respiratory Disease Mother         COPD    Stroke Mother     Cancer Father     Heart Disease Brother        She  has a past medical history of Asthma (08/28/2018), Chickenpox, COVID (12/30/2022), Depression, GERD (gastroesophageal reflux disease), [place] measles, Hyperlipidemia, Influenza, Mumps, Pain (08/28/2018), and Tonsillitis.  She  has a past surgical history that includes tubal coagulation laparoscopic bilateral (01/01/1994); cervical disk and fusion anterior (N/A, 09/10/2018); and laminotomy.         Objective:   Pulse 73   Temp 36.5 °C (97.7 °F)   Ht 1.676 m (5' 6\")   Wt 83.5 kg (184 lb)   LMP 06/29/2019 (Approximate)   SpO2 94%   BMI 29.70 kg/m²   RR 12    Physical Exam:  Constitutional: Alert, no distress, well-groomed.  Skin: No rashes in visible areas.  Eye: Round. Conjunctiva clear, lids normal. No icterus.  Patient notes pressure along her frontal sinuses.  ENMT: Lips pink without lesions, good dentition, moist mucous membranes. Phonation normal.  Neck: No masses, no thyromegaly. Moves freely without pain.  Respiratory: Unlabored respiratory effort, no cough or audible wheeze  Psych: Alert and oriented x3, normal affect and mood.     Assessment and Plan:   The following treatment plan was discussed:     1. Acute non-recurrent frontal sinusitis  Warm compresses on sinuses, continue Qnasl, antihistamine, Singulair.  Doxycycline twice a day for 10 days.  Follow-up Monday if no improvement, may consider Decadron injection.  - doxycycline (VIBRAMYCIN) 100 MG Tab; Take 1 Tablet by mouth 2 times a day.  Dispense: 20 Tablet; Refill: 0      Follow-up: Return if symptoms worsen or fail to improve.         Boo (Baker) " Jesus GARCIA  Physician Assistant Certified  [facility]    Please note that this dictation was created using voice recognition software. I have made every reasonable attempt to correct obvious errors, but I expect that there are errors of grammar and possibly content that I did not discover before finalizing the note.

## 2023-02-02 ENCOUNTER — HOME STUDY (OUTPATIENT)
Dept: SLEEP MEDICINE | Facility: MEDICAL CENTER | Age: 58
End: 2023-02-02
Attending: PREVENTIVE MEDICINE
Payer: COMMERCIAL

## 2023-02-02 DIAGNOSIS — R06.83 SNORING: ICD-10-CM

## 2023-02-02 DIAGNOSIS — G47.19 EXCESSIVE DAYTIME SLEEPINESS: ICD-10-CM

## 2023-02-02 DIAGNOSIS — G47.30 BREATHING-RELATED SLEEP DISORDER: ICD-10-CM

## 2023-02-02 PROCEDURE — 95800 SLP STDY UNATTENDED: CPT | Performed by: PREVENTIVE MEDICINE

## 2023-02-06 ENCOUNTER — OFFICE VISIT (OUTPATIENT)
Dept: URGENT CARE | Facility: PHYSICIAN GROUP | Age: 58
End: 2023-02-06
Payer: COMMERCIAL

## 2023-02-06 VITALS
HEART RATE: 81 BPM | BODY MASS INDEX: 29.57 KG/M2 | HEIGHT: 66 IN | OXYGEN SATURATION: 95 % | WEIGHT: 184 LBS | DIASTOLIC BLOOD PRESSURE: 80 MMHG | RESPIRATION RATE: 16 BRPM | SYSTOLIC BLOOD PRESSURE: 118 MMHG | TEMPERATURE: 97.8 F

## 2023-02-06 DIAGNOSIS — J01.90 ACUTE BACTERIAL SINUSITIS: ICD-10-CM

## 2023-02-06 DIAGNOSIS — B96.89 ACUTE BACTERIAL SINUSITIS: ICD-10-CM

## 2023-02-06 PROCEDURE — 99213 OFFICE O/P EST LOW 20 MIN: CPT

## 2023-02-06 RX ORDER — AMOXICILLIN AND CLAVULANATE POTASSIUM 500; 125 MG/1; MG/1
1 TABLET, FILM COATED ORAL 3 TIMES DAILY
Qty: 21 TABLET | Refills: 0 | Status: SHIPPED | OUTPATIENT
Start: 2023-02-06 | End: 2023-02-13

## 2023-02-06 RX ORDER — AMOXICILLIN AND CLAVULANATE POTASSIUM 875; 125 MG/1; MG/1
1 TABLET, FILM COATED ORAL 2 TIMES DAILY
Qty: 14 TABLET | Refills: 0 | Status: SHIPPED
Start: 2023-02-06 | End: 2023-02-06

## 2023-02-06 RX ORDER — PREDNISONE 20 MG/1
40 TABLET ORAL DAILY
Qty: 10 TABLET | Refills: 0 | Status: SHIPPED | OUTPATIENT
Start: 2023-02-06 | End: 2023-02-11

## 2023-02-06 ASSESSMENT — FIBROSIS 4 INDEX: FIB4 SCORE: 1.18

## 2023-02-07 ASSESSMENT — ENCOUNTER SYMPTOMS
CHILLS: 0
SINUS PAIN: 1
FEVER: 0
COUGH: 1
HEMOPTYSIS: 0
SHORTNESS OF BREATH: 0
WHEEZING: 0
SPUTUM PRODUCTION: 0
SORE THROAT: 0

## 2023-02-07 NOTE — PROGRESS NOTES
Subjective:   Kaley Dean is a 57 y.o. female who presents for Cough (Sinus pressure, sore throat, had covid 12/30/23, symptoms not improving. )      HPI: This is a 57-year-old female who presents today for evaluation of sinus problem.  Patient was diagnosed with COVID-19 in the last week of December 2022.  She was treated with Paxlovid, and completed full course of antiviral therapy.  She reports following up with her primary care physician for ongoing sinus problems and was treated with 10-day course of doxycycline.  Patient reports completion of antibiotic therapy with continued facial pain, postnasal drip, headaches, nasal congestion, and cough.  She reports pain is 7\10.  In conjunction with antibiotic she has been taking antihistamine daily.  No fevers.        Review of Systems   Constitutional:  Negative for chills, fever and malaise/fatigue.   HENT:  Positive for congestion, ear pain and sinus pain. Negative for sore throat.    Respiratory:  Positive for cough. Negative for hemoptysis, sputum production, shortness of breath and wheezing.      Medications:    Current Outpatient Medications on File Prior to Visit   Medication Sig Dispense Refill    pravastatin (PRAVACHOL) 40 MG tablet Take 1 Tablet by mouth every evening. 90 Tablet 1    omeprazole (PRILOSEC) 40 MG delayed-release capsule Take 1 Capsule by mouth every day. 90 Capsule 3    montelukast (SINGULAIR) 10 MG Tab Take 1 Tablet by mouth every day. 90 Tablet 3    Beclomethasone Dipropionate (QNASL) 80 MCG/ACT Aero Soln Administer 1 Spray into affected nostril(S) every day. 10.6 g 0    tretinoin (RETIN-A) 0.1 % cream Apply topically to affected area at night. 45 g 3    clindamycin-benzoyl peroxide (BENZACLIN) gel Apply 1 Application topically 2 times a day. 50 g 3    citalopram (CELEXA) 20 MG Tab Take 1 Tablet by mouth every day. 90 Tablet 0    albuterol 108 (90 Base) MCG/ACT Aero Soln inhalation aerosol Inhale 2 Puffs every four hours as needed for  Shortness of Breath. 6.7 g 3    ADVAIR DISKUS 500-50 MCG/DOSE AEROSOL POWDER, BREATH ACTIVATED INHALE 1 PUFF EVERY 12 HOURS. 1 Each 11    progesterone (PROMETRIUM) 100 MG Cap       estradiol (CLIMARA) 0.0375 MG/24HR patch Place 1 Patch on the skin every 7 days. 12 Patch 3    Magnesium 500 MG Cap Take  by mouth.      RESTASIS 0.05 % ophthalmic emulsion       Spacer/Aero Chamber Mouthpiece Misc 1 Device by Does not apply route as needed. 1 Device 0    cetirizine (ZYRTEC) 10 MG TABS Take 1 Tablet by mouth every day.      vitamin D (CHOLECALCIFEROL) 1000 UNIT TABS Take 2 Tablets by mouth every day.      Multiple Vitamins-Minerals (OCUVITE PO) Take 1 Tab by mouth every evening.      doxycycline (VIBRAMYCIN) 100 MG Tab Take 1 Tablet by mouth 2 times a day. (Patient not taking: Reported on 2/6/2023) 20 Tablet 0    valACYclovir (VALTREX) 500 MG Tab TAKE ONE TABLET BY MOUTH TWICE DAILY FOR TEN DAYS. (Patient not taking: Reported on 1/19/2023)       No current facility-administered medications on file prior to visit.        Allergies:   Miacalcin [kdc:calcitonin+chlorobutanol], Simvastatin, and Sulfa drugs    Problem List:   Patient Active Problem List   Diagnosis    Menopause    Hemorrhoids    Allergic rhinitis    Mixed hyperlipidemia    Moderate persistent asthma without complication    Decreased hearing of both ears    Bilateral dry eyes    Acne rosacea    Personal history of colonic polyps    Vitamin D deficiency    Primary osteoarthritis of first carpometacarpal joint of right hand    Primary osteoarthritis of first carpometacarpal joint of left hand    Trigger index finger of right hand    Trigger ring finger of right hand    Dyspepsia    HSV-2 (herpes simplex virus 2) infection    Joint pain in both hands    Other fatigue    Hepatic steatosis    Transaminitis    Sinus pressure        Surgical History:  Past Surgical History:   Procedure Laterality Date    CERVICAL DISK AND FUSION ANTERIOR N/A 09/10/2018    Procedure:  "CERVICAL DISK AND FUSION ANTERIOR- C5-7 WITH PLATE;  Surgeon: Wallace Zamora M.D.;  Location: SURGERY [facility];  Service: Neurosurgery    TUBAL COAGULATION LAPAROSCOPIC BILATERAL  1994    LAMINOTOMY         Past Social Hx:   Social History     Tobacco Use    Smoking status: Former     Packs/day: 2.00     Years: 12.00     Pack years: 24.00     Types: Cigarettes     Start date: 1975     Quit date: 1987     Years since quittin.1    Smokeless tobacco: Never   Vaping Use    Vaping Use: Never used   Substance Use Topics    Alcohol use: Yes     Alcohol/week: 1.2 oz     Types: 2 Glasses of wine per week     Comment: 1-5 per week    Drug use: No          Problem list, medications, and allergies reviewed by myself today in Epic.     Objective:     /80 (BP Location: Left arm, Patient Position: Sitting, BP Cuff Size: Adult)   Pulse 81   Temp 36.6 °C (97.8 °F) (Temporal)   Resp 16   Ht 1.676 m (5' 6\")   Wt 83.5 kg (184 lb)   LMP 2019 (Approximate)   SpO2 95%   BMI 29.70 kg/m²     Physical Exam  Vitals and nursing note reviewed.   Constitutional:       General: She is not in acute distress.     Appearance: Normal appearance. She is normal weight. She is not ill-appearing, toxic-appearing or diaphoretic.   HENT:      Head: Normocephalic and atraumatic.      Right Ear: Tympanic membrane, ear canal and external ear normal. There is no impacted cerumen.      Left Ear: Tympanic membrane, ear canal and external ear normal. There is no impacted cerumen.      Nose: No congestion or rhinorrhea.      Right Sinus: Maxillary sinus tenderness and frontal sinus tenderness present.      Left Sinus: Maxillary sinus tenderness and frontal sinus tenderness present.      Mouth/Throat:      Mouth: Mucous membranes are moist.      Pharynx: Oropharynx is clear. No oropharyngeal exudate or posterior oropharyngeal erythema.   Cardiovascular:      Rate and Rhythm: Normal rate and regular rhythm.      " Pulses: Normal pulses.      Heart sounds: Normal heart sounds. No murmur heard.    No friction rub. No gallop.   Pulmonary:      Effort: Pulmonary effort is normal. No respiratory distress.      Breath sounds: Normal breath sounds. No stridor. No wheezing, rhonchi or rales.   Chest:      Chest wall: No tenderness.   Musculoskeletal:      Cervical back: Neck supple. No tenderness.   Lymphadenopathy:      Cervical: No cervical adenopathy.   Skin:     General: Skin is warm and dry.      Capillary Refill: Capillary refill takes less than 2 seconds.   Neurological:      General: No focal deficit present.      Mental Status: She is alert and oriented to person, place, and time. Mental status is at baseline.      Gait: Gait normal.   Psychiatric:         Mood and Affect: Mood normal.         Behavior: Behavior normal.         Thought Content: Thought content normal.         Judgment: Judgment normal.       Assessment/Plan:     Diagnosis and associated orders:   1. Acute bacterial sinusitis  predniSONE (DELTASONE) 20 MG Tab    amoxicillin-clavulanate (AUGMENTIN) 500-125 MG Tab    DISCONTINUED: amoxicillin-clavulanate (AUGMENTIN) 875-125 MG Tab          Comments/MDM:   Pt is clinically stable at today's acute urgent care visit.  No acute distress noted. Appropriate for outpatient management at this time.     Acute problem. Patient presents today for evaluation of ongoing sinus problems that have occurred after COVID 19 infection. She has completed course of Doxycycline without any improved symptoms. Patient will be treated with Augmentin 7 day course and prescribed Prednisone 40mg PO x5 days. I have advised her to begin taking medications as prescribed, continue antihistamine, intranasal saline irrigations, and use humidifier. She is to return for any new or worsening s/s and follow up with PCP for recheck.            Discussed DDx, management options (risks,benefits, and alternatives to planned treatment), natural  progression and supportive care.  Expressed understanding and the treatment plan was agreed upon. Questions were encouraged and answered   Return to urgent care prn if new or worsening sx or if there is no improvement in condition prn.    Educated in Red flags and indications to immediately call 911 or present to the Emergency Department.   Advised the patient to follow-up with the primary care physician for recheck, reevaluation, and consideration of further management.    I personally reviewed prior external notes and test results pertinent to today's visit.  I have independently reviewed and interpreted all diagnostics ordered during this urgent care acute visit.         Please note that this dictation was created using voice recognition software. I have made a reasonable attempt to correct obvious errors, but I expect that there are errors of grammar and possibly content that I did not discover before finalizing the note.    This note was electronically signed by PASCUAL Ley

## 2023-02-09 NOTE — PROCEDURES
DIAGNOSTIC HOME SLEEP TEST (HST) REPORT WatchPAT      PATIENT ID:  NAME:  Kaley Dean  MRN:               [id number]  YOB: 1965  DATE OF STUDY: 2/2/23      Impression:     This study shows evidence of:      1.  Severe obstructive sleep apnea with PAT apnea hyponea index(pAHI) of 48.7 per hour.  PAT respiratory disturbance index (pRDI) was 49.1  per hour. These findings are based on 7 channels recording of PAT signal with sleep staging, heart rate, pulse oximetry, actigraphy, body position, snoring and respiratory movement.     2. Oxygenation O2 Sat. mean O2 sat was 88%,  sammie was 64%,  and maximum O2 at 96%. O2 sat was at or  below 88% for 211.7 min of evaluation time. AVG HR was 73 BPM.      TECHNICAL DESCRIPTION: Patient underwent home sleep apnea testing with peripheral arterial tone signal (WatchPAT™). Monitoring was done with 7 channels recording of PAT signal with sleep staging, heart rate, pulse oximetry, actigraphy, body position, snoring and respiratory movement. Prior to using the device, the patient received verbal and written instructions for its application and was provided with the help desk phone number for additional telephonic instruction with 24-hour availability of qualified personnel to answer questions.    Respiratory events:    pRDI: Total 430 (REM index 58.4/hr. NREM index 4549 /hr, All Night 0.1 /hr)    3% pAHI: Total 426 (REM index 51.7/hr. NREM index 44.6 /hr, All Night 48.7 /hr)    3% pAHIc: Total 16 ( All Night 1.8 /hr)    4% pAHI: Total 341 (All Night 38.9 /hr)    General sleep summary: . Total recording time is 9 hours and 48 minutes.  The estimated sleep time for the patient is 8 hours and 58 minutes.  The patient spent 402.3 minutes in the supine position and 104.0 minutes in the nonsupine position.    ASSESSMENT: This patient has severe obstructive sleep apnea as well as severe nocturnal hypoxia.    Recommendations:    1.  An in lab treatment  (titration) study will be needed.  2.  In general patients with sleep apnea are advised to avoid alcohol and sedatives and to not operate a motor vehicle while drowsy. In some cases alternative treatment options may prove effective in resolving sleep apnea in these options include upper airway surgery, the use of a dental orthotic or weight loss and positional therapy. Clinical correlation is required.

## 2023-02-14 ENCOUNTER — PATIENT MESSAGE (OUTPATIENT)
Dept: SLEEP MEDICINE | Facility: MEDICAL CENTER | Age: 58
End: 2023-02-14
Payer: COMMERCIAL

## 2023-02-14 DIAGNOSIS — G47.33 OSA (OBSTRUCTIVE SLEEP APNEA): ICD-10-CM

## 2023-02-14 DIAGNOSIS — G47.34 NOCTURNAL HYPOXIA: ICD-10-CM

## 2023-02-15 DIAGNOSIS — G47.33 OSA (OBSTRUCTIVE SLEEP APNEA): ICD-10-CM

## 2023-02-15 DIAGNOSIS — F51.02 ADJUSTMENT INSOMNIA: ICD-10-CM

## 2023-02-15 RX ORDER — ZOLPIDEM TARTRATE 5 MG/1
5 TABLET ORAL NIGHTLY PRN
Qty: 2 TABLET | Refills: 0 | Status: SHIPPED | OUTPATIENT
Start: 2023-02-15 | End: 2024-02-14

## 2023-02-15 NOTE — PROGRESS NOTES
This patient has requested a sleep aid for her night titration study in the sleep lab.   Ambien five mg tabs x2 will be ordered.  Pt is to take the first 5 mg of Ambien once she is in bed at the sleep lab.  If she is awake 45 minutes later she can take the second tablet

## 2023-03-14 ENCOUNTER — SLEEP STUDY (OUTPATIENT)
Dept: SLEEP MEDICINE | Facility: MEDICAL CENTER | Age: 58
End: 2023-03-14
Attending: PREVENTIVE MEDICINE
Payer: COMMERCIAL

## 2023-03-14 DIAGNOSIS — G47.33 OSA (OBSTRUCTIVE SLEEP APNEA): ICD-10-CM

## 2023-03-14 DIAGNOSIS — G47.34 NOCTURNAL HYPOXIA: ICD-10-CM

## 2023-03-14 PROCEDURE — 95811 POLYSOM 6/>YRS CPAP 4/> PARM: CPT | Performed by: PREVENTIVE MEDICINE

## 2023-03-16 ENCOUNTER — TELEPHONE (OUTPATIENT)
Dept: SLEEP MEDICINE | Facility: MEDICAL CENTER | Age: 58
End: 2023-03-16
Payer: COMMERCIAL

## 2023-03-17 ENCOUNTER — OFFICE VISIT (OUTPATIENT)
Dept: SLEEP MEDICINE | Facility: MEDICAL CENTER | Age: 58
End: 2023-03-17
Attending: PREVENTIVE MEDICINE
Payer: COMMERCIAL

## 2023-03-17 VITALS
WEIGHT: 187 LBS | BODY MASS INDEX: 30.05 KG/M2 | OXYGEN SATURATION: 96 % | DIASTOLIC BLOOD PRESSURE: 84 MMHG | HEIGHT: 66 IN | SYSTOLIC BLOOD PRESSURE: 124 MMHG | HEART RATE: 73 BPM | RESPIRATION RATE: 16 BRPM

## 2023-03-17 DIAGNOSIS — G47.33 OSA (OBSTRUCTIVE SLEEP APNEA): ICD-10-CM

## 2023-03-17 DIAGNOSIS — G47.34 NOCTURNAL HYPOXIA: ICD-10-CM

## 2023-03-17 PROCEDURE — 99212 OFFICE O/P EST SF 10 MIN: CPT | Performed by: PREVENTIVE MEDICINE

## 2023-03-17 PROCEDURE — 99214 OFFICE O/P EST MOD 30 MIN: CPT | Performed by: PREVENTIVE MEDICINE

## 2023-03-17 ASSESSMENT — FIBROSIS 4 INDEX: FIB4 SCORE: 1.18

## 2023-03-17 NOTE — PROGRESS NOTES
"CHIEF COMPLIANT: \"How did I do on my sleep study?\"   LAST SEEN:  Dr. Hampton on 11/18/22  HISTORY OF PRESENT ILLNESS:  Kaley Dean is a 57 y.o.female who is here for sleep study results.     Sleep study results:   TYPE: titration   DATE: 03/14/2023  TREATMENT AHI: 9.5   TREATMENT Oxygen Sammie: 83% with 26.3mins at or under 88%   TITRATION: CPAP 5     Severe obstructive sleep apnea with PAT apnea hyponea index(pAHI) of 48.7 per hour.  PAT respiratory disturbance index (pRDI) was 49.1  per hour. These findings are based on 7 channels recording of PAT signal with sleep staging, heart rate, pulse oximetry, actigraphy, body position, snoring and respiratory movement.      2. Oxygenation O2 Sat. mean O2 sat was 88%,  sammie was 64%,  and maximum O2 at 96%. O2 sat was at or  below 88% for 211.7 min of evaluation time. AVG HR was 73 BPM.        TECHNICAL DESCRIPTION: Patient underwent home sleep apnea testing with peripheral arterial tone signal (WatchPAT™). Monitoring was done with 7 channels recording of PAT signal with sleep staging, heart rate, pulse oximetry, actigraphy, body position, snoring and respiratory movement. Prior to using the device, the patient received verbal and written instructions for its application and was provided with the help desk phone number for additional telephonic instruction with 24-hour availability of qualified personnel to answer questions.     Respiratory events:     pRDI: Total 430 (REM index 58.4/hr. NREM index 4549 /hr, All Night 0.1 /hr)     3% pAHI: Total 426 (REM index 51.7/hr. NREM index 44.6 /hr, All Night 48.7 /hr)     3% pAHIc: Total 16 ( All Night 1.8 /hr)     4% pAHI: Total 341 (All Night 38.9 /hr)     General sleep summary: . Total recording time is 9 hours and 48 minutes.  The estimated sleep time for the patient is 8 hours and 58 minutes.  The patient spent 402.3 minutes in the supine position and 104.0 minutes in the nonsupine position.     ASSESSMENT: This patient " has severe obstructive sleep apnea as well as severe nocturnal hypoxia.     Recommendations:       Significant comorbidities and modifying factors: see below     PAST MEDICAL HISTORY:  Past Medical History:   Diagnosis Date    Asthma 08/28/2018    with inhalers    Chickenpox     COVID 12/30/2022    Depression     GERD (gastroesophageal reflux disease)     [place] measles     Hyperlipidemia     Influenza     Mumps     Pain 08/28/2018    hands, arms, shoulder and neck    Tonsillitis       PROBLEM LIST:  Patient Active Problem List    Diagnosis Date Noted    Sinus pressure 01/19/2023    Hepatic steatosis 12/30/2022    Transaminitis 12/30/2022    Dyspepsia 11/10/2022    HSV-2 (herpes simplex virus 2) infection 11/10/2022    Joint pain in both hands 11/10/2022    Other fatigue 11/10/2022    Trigger index finger of right hand 04/13/2022    Trigger ring finger of right hand 04/13/2022    Primary osteoarthritis of first carpometacarpal joint of right hand 10/18/2021    Primary osteoarthritis of first carpometacarpal joint of left hand 10/18/2021    Vitamin D deficiency 11/12/2019    Personal history of colonic polyps 09/26/2016    Acne rosacea 04/24/2015    Bilateral dry eyes 01/16/2015    Menopause 01/13/2015    Hemorrhoids 01/13/2015    Allergic rhinitis 01/13/2015    Mixed hyperlipidemia 01/13/2015    Moderate persistent asthma without complication 01/13/2015    Decreased hearing of both ears 01/13/2015     PAST SOCIAL HISTORY:  Past Surgical History:   Procedure Laterality Date    CERVICAL DISK AND FUSION ANTERIOR N/A 09/10/2018    Procedure: CERVICAL DISK AND FUSION ANTERIOR- C5-7 WITH PLATE;  Surgeon: Wallace Zamora M.D.;  Location: SURGERY [facility];  Service: Neurosurgery    TUBAL COAGULATION LAPAROSCOPIC BILATERAL  01/01/1994    LAMINOTOMY       PAST FAMILY HISTORY:  Family History   Problem Relation Age of Onset    Hypertension Mother     Kidney Disease Mother     Respiratory Disease Mother         COPD     Stroke Mother     Cancer Father     Heart Disease Brother      SOCIAL HISTORY:  Social History     Socioeconomic History    Marital status:      Spouse name: Not on file    Number of children: Not on file    Years of education: Not on file    Highest education level: Some college, no degree   Occupational History    Not on file   Tobacco Use    Smoking status: Former     Packs/day: 2.00     Years: 12.00     Pack years: 24.00     Types: Cigarettes     Start date: 1975     Quit date: 1987     Years since quittin.2    Smokeless tobacco: Never   Vaping Use    Vaping Use: Never used   Substance and Sexual Activity    Alcohol use: Yes     Alcohol/week: 1.2 oz     Types: 2 Glasses of wine per week     Comment: 1-5 per week    Drug use: No    Sexual activity: Yes     Partners: Male     Birth control/protection: Surgical   Other Topics Concern    Not on file   Social History Narrative    Not on file     Social Determinants of Health     Financial Resource Strain: Low Risk     Difficulty of Paying Living Expenses: Not hard at all   Food Insecurity: No Food Insecurity    Worried About Running Out of Food in the Last Year: Never true    Ran Out of Food in the Last Year: Never true   Transportation Needs: No Transportation Needs    Lack of Transportation (Medical): No    Lack of Transportation (Non-Medical): No   Physical Activity: Sufficiently Active    Days of Exercise per Week: 3 days    Minutes of Exercise per Session: 50 min   Stress: Stress Concern Present    Feeling of Stress : To some extent   Social Connections: Moderately Isolated    Frequency of Communication with Friends and Family: Three times a week    Frequency of Social Gatherings with Friends and Family: Twice a week    Attends Christianity Services: Never    Active Member of Clubs or Organizations: No    Attends Club or Organization Meetings: Never    Marital Status:    Intimate Partner Violence: Not on file   Housing Stability: Low  Risk     Unable to Pay for Housing in the Last Year: No    Number of Places Lived in the Last Year: 1    Unstable Housing in the Last Year: No     ALLERGIES: Miacalcin [kdc:calcitonin+chlorobutanol], Simvastatin, and Sulfa drugs  MEDICATIONS:  Current Outpatient Medications   Medication Sig Dispense Refill    citalopram (CELEXA) 20 MG Tab Take 1 Tablet by mouth every day. 90 Tablet 0    pravastatin (PRAVACHOL) 40 MG tablet Take 1 Tablet by mouth every evening. 90 Tablet 1    omeprazole (PRILOSEC) 40 MG delayed-release capsule Take 1 Capsule by mouth every day. 90 Capsule 3    montelukast (SINGULAIR) 10 MG Tab Take 1 Tablet by mouth every day. 90 Tablet 3    Beclomethasone Dipropionate (QNASL) 80 MCG/ACT Aero Soln Administer 1 Spray into affected nostril(S) every day. 10.6 g 0    tretinoin (RETIN-A) 0.1 % cream Apply topically to affected area at night. 45 g 3    clindamycin-benzoyl peroxide (BENZACLIN) gel Apply 1 Application topically 2 times a day. 50 g 3    albuterol 108 (90 Base) MCG/ACT Aero Soln inhalation aerosol Inhale 2 Puffs every four hours as needed for Shortness of Breath. 6.7 g 3    ADVAIR DISKUS 500-50 MCG/DOSE AEROSOL POWDER, BREATH ACTIVATED INHALE 1 PUFF EVERY 12 HOURS. 1 Each 11    progesterone (PROMETRIUM) 100 MG Cap       estradiol (CLIMARA) 0.0375 MG/24HR patch Place 1 Patch on the skin every 7 days. 12 Patch 3    Magnesium 500 MG Cap Take  by mouth.      RESTASIS 0.05 % ophthalmic emulsion       Spacer/Aero Chamber Mouthpiece Misc 1 Device by Does not apply route as needed. 1 Device 0    cetirizine (ZYRTEC) 10 MG TABS Take 1 Tablet by mouth every day.      vitamin D (CHOLECALCIFEROL) 1000 UNIT TABS Take 2 Tablets by mouth every day.      Multiple Vitamins-Minerals (OCUVITE PO) Take 1 Tab by mouth every evening.      zolpidem (AMBIEN) 5 MG Tab Take 1 Tablet by mouth at bedtime as needed for Sleep (for sleep study only) for up to 2 doses. One day supply. 2 Tablet 0     No current  "facility-administered medications for this visit.    \"CURRENT RX\"    REVIEW OF SYSTEMS:  Constitutional: Denies weight loss, endorses chronic daytime fatigue  Eyes: Denies vision changes  Ears/Nose/Mouth/Throat: Denies rhinitis/nasal congestion, injury, decayed teeth/toothaches.  Cardiovascular: Denies chest pain, tightness, palpitations, swelling in legs/feet, difficulty breathing when lying down but gets better when sitting up.   Respiratory: Denies shortness of breath while awake,  Sleep: per HPI  Gastrointestinal: Denies  difficulty swallowing,  heartburn.  Genitourinary: REPORTS nocturia  Musculoskeletal: Denies painful joints, sore muscles, back pain.   Neurological: Denies frequent headaches,weakness, dizziness.    PHYSICAL EXAM/VITALS:  /84 (BP Location: Left arm, Patient Position: Sitting, BP Cuff Size: Adult)   Pulse 73   Resp 16   Ht 1.676 m (5' 6\")   Wt 84.8 kg (187 lb)   LMP 06/29/2019 (Approximate)   SpO2 96%   BMI 30.18 kg/m²   Appearance: Well-nourished, well-developed,  looks stated age, no acute distress  Eyes:   EOMI  ENMT: MASKED  Neck: Supple, trachea midline  Respiratory effort:  No intercostal retractions or use of accessory muscles  Musculoskeletal:  Grossly normal; gait and station normal  Neurologic:  oriented to person, time, place, and purpose; judgement intact  Psychiatric:  No depression, anxiety, agitation      MEDICAL DECISION MAKING:  The medical record was reviewed as it pertains to this referral. This includes records from primary care,consultants notes, referral request, hospital records, labs and imaging. Any available diagnostic and titration nocturnal polysomnograms, home sleep apnea tests, continuous nocturnal oximetry results, multiple sleep latency tests, and recent compliance reports were reviewed with the patient.    ASSESSMENT/PLAN:  Kaley Dean is a 57 y.o.female who will do well on a ResMed APAP with initial settings from min 5 to max 10 cm H20. " Careful mask fit and heated humidification are required part of this prescription. This patient will need to meet all insurance compliance requirements.   She will require an OPO once is stable and compliant on PAP therapy.     1. GERARDO (obstructive sleep apnea)  - DME CPAP  - DME Mask Fitting; Future    2. Nocturnal hypoxia  - DME CPAP  - DME Mask Fitting; Future        MEETING INSURANCE COMPLIANCE REQUIREMENTS and MISC tips:     The patient has 90 days in order to be deemed compliant by the insurance company.  The 90-day starts the day that he receives the machine and supplies from the DME.  It is during this period of time that the patient must demonstrate a consistent use of pap (greater than 4 hours/day for a minimum of 24 days out of 30).  The patient is aware that  PAP usage ( time) will be added up--  together over a 24-hour period.  This simply means that the patient does not have to use the machine for 4 hours in a row and he does not have to be asleep for the minutes to count towards the required 4 hours.      It is recommended to the patient that he begin Pap therapy by first just wearing the mask and headgear loosely applied to the face for 20 minutes a day for the first 2 days.  Then  the patient may begin using the mask and headgear with the machine to get a feel for using the mask with a good seal This can be accomplished by using the entire PAP set up with the machine on for 20 to 30 minutes while the patient is awake.  Try this for 2 days.  These usage exercises will  allow the face and brain to get accustomed to mask, headgear and air pressures.      Also  the patient is instructed to keep the machine located slightly ( 6-12 ins)  below the level of the mattress.  This allows for proper humidification of the air before it reaches nasal passages and prevents inhaling water droplets.      Cleaning the mask, headgear, tubing, water reservoir to be done using hot water and a gentle detergent once a  week.    The mask can be removed from the headgear and rinsed under hot water daily.  There is no special machine or device that is needed to keep the machine or supplies clean.    The risks of untreated sleep apnea were discussed with the patient at length. Patients with GERARDO are at increased risk of cardiovascular disease including coronary artery disease, systemic arterial hypertension, pulmonary arterial hypertension, cardiac arrhythmias, and stroke. GERARDO patients have an increased risk of motor vehicle accidents, type 2 diabetes, chronic kidney disease, and non-alcoholic liver disease. The patient was advised to avoid driving a motor vehicle when drowsy.  Have advised the patient to follow up with the appropriate healthcare practitioners for all other medical problems and issues.    RETURN TO CLINIC: Return in about 10 weeks (around 5/26/2023) for Compliance check.    My total time spent caring for the patient on the day of the encounter was 40 minutes. This includes time spent on a thorough chart review including other physician notes, all sleep studies, as well as critical labs and pulmonary and cardiac studies.  Additionally, it includes a thorough discussion of good sleep hygiene and stimulus control, as well as  the need for consistency in terms of sleep preparation and practice.    Please note that this dictation was created using voice recognition software.  I have made every reasonable attempt to correct obvious errors, I expect that there are errors of grammar and possibly content that I did not discover before finalizing this note.

## 2023-03-17 NOTE — PROCEDURES
Physician:   Patient: FÉLIX LUNA  ID: [id number] Date: 3/14/2023 Exam No.:   MONTAGE: Standard  STUDY TYPE: Treatment      RECORDING TECHNIQUE:   After the scalp was prepared, gold plated electrodes were applied to the scalp according to the International 10-20 System. EEG (electroencephalogram) was continuously monitored from the O1-M2, O2-M1, C3-M2, C4-M1, F3-M2, and F4-M1. EOGs (electrooculograms) were monitored by electrodes placed at the left and right outer canthi. Chin EMG (electromyogram) was monitored by electrodes placed on the mentalis and sub-mentalis muscles. Nasal and oral airflow were monitored using a triple port thermocouple as well as oronasal pressure transducer. Respiratory effort was measured by inductive plethysmography technology employing abdominal and thoracic belts. Blood oxygen saturation and pulse were monitored by pulse oximetry. Heart rhythm was monitored by surface electrocardiogram. Leg EMG was studied using surface electrodes placed on left and right anterior tibialis. A microphone was used to monitor tracheal sounds and snoring. Body position was monitored and documented by technician observation.   SCORING CRITERIA:   A modification of the AASM manual for scoring of sleep and associated events was used. Obstructive apneas were scored by cessation of airflow for at least 10 seconds with continuing respiratory effort. Central apneas were scored by cessation of airflow for at least 10 seconds with no respiratory effort. Hypopneas were scored by a 30% or more reduction in airflow for at least 10 seconds accompanied by arterial oxygen desaturation of 3% or an arousal. For CMS (Medicare) patients, per AASM rule 1B, hypopneas are scored by 30% with mild reduction in airflow for at least 10 seconds accompanied by arterial saturation decreased at 4%.       Study start time was 10:54:32 PM. Diagnostic recording time was 6h 36.0m with a total sleep time of 5h 36.5m resulting in a sleep  efficiency of 84.97%%. Sleep latency from the start of the study was 14 minutes and the latency from sleep to REM was 126 minutes. In total,83 arousals were scored for an arousal index of 14.8.  Respiratory:  There were a total of 1 apneas consisting of 0 obstructive apneas, 0 mixed apneas, and 1 central apneas. A total of 52 hypopneas were scored. The apnea index was 0.18 per hour and the hypopnea index was 9.27 per hour resulting in an overall AHI of 9.45. AHI during rem was 15.9 and AHI while supine was 9.35.  Oximetry:  There was a mean oxygen saturation of 91.0%. The minimum oxygen saturation in NREM was 83.0 % and in REM was 84.0. The patient spent 26.3 minutes of TST with SaO2 <88%.  Cardiac:  The highest heart rate seen while awake was 82 BPM while the highest heart rate during sleep was 77 BPM with an average sleeping heart rate of 62 BPM.  Limb Movements:  There were a total of 36 PLMs during sleep which resulted in a PLMS index of 6.4. Of these, 22 were associated with arousals which resulted in a PLMS arousal index of 3.9.  Titration:   This was a fully attended sleep study. This test was technically adequate. This patient was titrated on CPAP starting at 5 cm of water pressure. Patient was titrated up to 9 cm of water pressure. Patient did best at 5 cm of water pressure. Patient spent 46 minutes at that pressure and their AHI was 3.0 which is considered normal limits.    Assessment:   Mild Obstructive Sleep Apnea Hypopnea - AHI overall AHI of 9.45. AHI during rem was 15.9 and AHI while supine was 9.35.  Mild Nocturnal desaturation - sammie saturation 83% - saturations <88% below for 26.3minutes of TST.  Recommendation:   Patient will do well on a ResMed APAP with initial settings from min 5 to max 10 cm H20. Careful mask fit and heated humidification are required part of this prescription. This patient will need to meet all insurance compliance requirements.

## 2023-03-21 ENCOUNTER — APPOINTMENT (OUTPATIENT)
Dept: SLEEP MEDICINE | Facility: MEDICAL CENTER | Age: 58
End: 2023-03-21
Attending: PREVENTIVE MEDICINE
Payer: COMMERCIAL

## 2023-05-11 DIAGNOSIS — J45.40 MODERATE PERSISTENT ASTHMA WITHOUT COMPLICATION: ICD-10-CM

## 2023-05-11 RX ORDER — FLUTICASONE PROPIONATE AND SALMETEROL 500; 50 UG/1; UG/1
1 POWDER RESPIRATORY (INHALATION) EVERY 12 HOURS
Qty: 60 EACH | Refills: 11 | Status: SHIPPED | OUTPATIENT
Start: 2023-05-11

## 2023-05-17 ENCOUNTER — APPOINTMENT (OUTPATIENT)
Dept: RADIOLOGY | Facility: IMAGING CENTER | Age: 58
End: 2023-05-17
Attending: STUDENT IN AN ORGANIZED HEALTH CARE EDUCATION/TRAINING PROGRAM
Payer: COMMERCIAL

## 2023-05-17 ENCOUNTER — OFFICE VISIT (OUTPATIENT)
Dept: URGENT CARE | Facility: PHYSICIAN GROUP | Age: 58
End: 2023-05-17
Payer: COMMERCIAL

## 2023-05-17 VITALS
RESPIRATION RATE: 16 BRPM | BODY MASS INDEX: 30.05 KG/M2 | HEART RATE: 77 BPM | OXYGEN SATURATION: 96 % | WEIGHT: 187 LBS | TEMPERATURE: 97.9 F | HEIGHT: 66 IN

## 2023-05-17 DIAGNOSIS — J45.40 MODERATE PERSISTENT ASTHMA, UNSPECIFIED WHETHER COMPLICATED: ICD-10-CM

## 2023-05-17 DIAGNOSIS — R05.1 ACUTE COUGH: ICD-10-CM

## 2023-05-17 DIAGNOSIS — T36.95XA ANTIBIOTIC-INDUCED YEAST INFECTION: ICD-10-CM

## 2023-05-17 DIAGNOSIS — B37.9 ANTIBIOTIC-INDUCED YEAST INFECTION: ICD-10-CM

## 2023-05-17 PROCEDURE — 99214 OFFICE O/P EST MOD 30 MIN: CPT | Performed by: STUDENT IN AN ORGANIZED HEALTH CARE EDUCATION/TRAINING PROGRAM

## 2023-05-17 PROCEDURE — 71046 X-RAY EXAM CHEST 2 VIEWS: CPT | Mod: TC | Performed by: PHYSICIAN ASSISTANT

## 2023-05-17 RX ORDER — PREDNISONE 20 MG/1
40 TABLET ORAL DAILY
Qty: 10 TABLET | Refills: 0 | Status: SHIPPED | OUTPATIENT
Start: 2023-05-17 | End: 2023-05-22

## 2023-05-17 RX ORDER — PREDNISONE 20 MG/1
TABLET ORAL
COMMUNITY
Start: 2023-05-11 | End: 2024-02-14

## 2023-05-17 RX ORDER — BENZONATATE 100 MG/1
200 CAPSULE ORAL 3 TIMES DAILY PRN
COMMUNITY
Start: 2023-05-11 | End: 2024-02-14

## 2023-05-17 RX ORDER — AMOXICILLIN AND CLAVULANATE POTASSIUM 875; 125 MG/1; MG/1
TABLET, FILM COATED ORAL
COMMUNITY
Start: 2023-05-11 | End: 2024-02-14

## 2023-05-17 RX ORDER — FLUCONAZOLE 150 MG/1
TABLET ORAL
Qty: 2 TABLET | Refills: 0 | Status: SHIPPED | OUTPATIENT
Start: 2023-05-17 | End: 2024-02-14

## 2023-05-17 RX ORDER — BUDESONIDE AND FORMOTEROL FUMARATE DIHYDRATE 160; 4.5 UG/1; UG/1
AEROSOL RESPIRATORY (INHALATION)
Qty: 1 EACH | Refills: 1 | Status: SHIPPED | OUTPATIENT
Start: 2023-05-17 | End: 2024-02-14

## 2023-05-17 RX ORDER — VALACYCLOVIR HYDROCHLORIDE 500 MG/1
500 TABLET, FILM COATED ORAL DAILY
COMMUNITY
Start: 2023-04-20

## 2023-05-17 ASSESSMENT — FIBROSIS 4 INDEX: FIB4 SCORE: 1.18

## 2023-05-17 NOTE — PROGRESS NOTES
Subjective:   CHIEF COMPLAINT  Chief Complaint   Patient presents with    Cough     Was seen through tele doc, medications that were given are not helping, cough has been going on for 11 days         HPI  Kaley Dean is a 57 y.o. female who presents with a chief complaint of cough x11 days.  Initial onset of symptoms she had a TeleDoc appointment started on Augmentin, Tessalon Perles and steroids x5 days (40 mg x 3 days, 20 x2 days).  Says she felt somewhat better on the steroids however no real significant improvement of the cough.  Cough is mostly dry, aggravated with lying down, worse at night associated with wheezing.  She not had any fevers.  No nausea or vomiting.  Past medical history of asthma that she manages with Advair and albuterol; says she does not routinely use Advair however has been using it scheduled over the last 11 days since onset of symptoms.  She has a history of reflux that she managed with omeprazole as needed.    Patient reports she developed a yeast infection from antibiotics and is requesting treatment.      REVIEW OF SYSTEMS  General: no fever or chills  GI: no nausea or vomiting  See HPI for further details.    PAST MEDICAL HISTORY  Patient Active Problem List    Diagnosis Date Noted    Sinus pressure 01/19/2023    Hepatic steatosis 12/30/2022    Transaminitis 12/30/2022    Dyspepsia 11/10/2022    HSV-2 (herpes simplex virus 2) infection 11/10/2022    Joint pain in both hands 11/10/2022    Other fatigue 11/10/2022    Trigger index finger of right hand 04/13/2022    Trigger ring finger of right hand 04/13/2022    Primary osteoarthritis of first carpometacarpal joint of right hand 10/18/2021    Primary osteoarthritis of first carpometacarpal joint of left hand 10/18/2021    Vitamin D deficiency 11/12/2019    Personal history of colonic polyps 09/26/2016    Acne rosacea 04/24/2015    Bilateral dry eyes 01/16/2015    Menopause 01/13/2015    Hemorrhoids 01/13/2015    Allergic rhinitis  2015    Mixed hyperlipidemia 2015    Moderate persistent asthma without complication 2015    Decreased hearing of both ears 2015       SURGICAL HISTORY   has a past surgical history that includes tubal coagulation laparoscopic bilateral (1994); cervical disk and fusion anterior (N/A, 09/10/2018); and laminotomy.    ALLERGIES  Allergies   Allergen Reactions    Miacalcin [Kdc:Calcitonin+Chlorobutanol]      Skin turns RED.    Simvastatin      Muscle cramps    Sulfa Drugs Hives     swelling       CURRENT MEDICATIONS  albuterol Aers  amoxicillin-clavulanate Tabs  benzonatate Caps  budesonide Susp  budesonide-formoterol Aero  celecoxib Caps  cetirizine Tabs  citalopram Tabs  clindamycin-benzoyl peroxide  doxycycline Caps  estradiol  fluconazole  fluticasone-salmeterol Aepb  Magnesium Caps  montelukast Tabs  OCUVITE PO  omeprazole  pravastatin  predniSONE Tabs  progesterone Caps  Qnasl Aers  Restasis Emul  Spacer/Aero Chamber Mouthpiece Misc  tretinoin  triamcinolone acetonide Crea  valACYclovir Tabs  vitamin D Tabs  zolpidem Tabs    SOCIAL HISTORY  Social History     Tobacco Use    Smoking status: Former     Packs/day: 2.00     Years: 12.00     Pack years: 24.00     Types: Cigarettes     Start date: 1975     Quit date: 1987     Years since quittin.3    Smokeless tobacco: Never   Vaping Use    Vaping Use: Never used   Substance and Sexual Activity    Alcohol use: Yes     Alcohol/week: 1.2 oz     Types: 2 Glasses of wine per week     Comment: 1-5 per week    Drug use: No    Sexual activity: Yes     Partners: Male     Birth control/protection: Surgical       FAMILY HISTORY  Family History   Problem Relation Age of Onset    Hypertension Mother     Kidney Disease Mother     Respiratory Disease Mother         COPD    Stroke Mother     Cancer Father     Heart Disease Brother           Objective:   PHYSICAL EXAM  VITAL SIGNS: BP (!) 150/80 (BP Location: Right arm, Patient Position:  "Sitting, BP Cuff Size: Adult)   Pulse 77   Temp 36.6 °C (97.9 °F) (Temporal)   Resp 16   Ht 1.676 m (5' 6\")   Wt 84.8 kg (187 lb)   LMP 06/29/2019 (Approximate)   SpO2 96%   BMI 30.18 kg/m²  BP: 150/80    Gen: no acute distress, normal voice  Skin: dry, intact, moist mucosal membranes  Eyes: No conjunctival injection bilaterally.  Neck: Normal range of motion. No meningeal signs.   Lungs: No increased work of breathing.  CTAB w/ symmetric expansion  CV: RRR w/o murmurs or clicks  Psych: normal affect, normal judgement, alert, awake      RADIOLOGY RESULTS   DX-CHEST-2 VIEWS    Result Date: 5/17/2023 5/17/2023 1:37 PM HISTORY/REASON FOR EXAM:  Cough. TECHNIQUE/EXAM DESCRIPTION AND NUMBER OF VIEWS: Two views of the chest. COMPARISON:  3/2/2019 FINDINGS: Cardiomediastinal silhouette is normal. Aortic calcified atherosclerotic plaque. No focal consolidation, pleural effusion, pulmonary edema or pneumothorax. Postsurgical changes ACDF.     No acute cardiopulmonary abnormality.               Assessment/Plan:     1. Acute cough  DX-CHEST-2 VIEWS      2. Moderate persistent asthma, unspecified whether complicated  budesonide-formoterol (SYMBICORT) 160-4.5 MCG/ACT Aerosol    predniSONE (DELTASONE) 20 MG Tab      3. Antibiotic-induced yeast infection  fluconazole (DIFLUCAN) 150 MG tablet      1-2)Suspect persistent cough multifactorial secondary to uncontrolled asthma, worsened by viral respiratory infection with underlying acid reflux that is not managed with a daily PPI.  Patient had a persistent, dry/bronchial cough during the encounter but was in no respiratory distress.  Lungs were clear to auscultation bilaterally without any adventitious sounds.  Chest x-ray was negative for any acute cardiopulmonary process.  Just completed a course of augmentin and prednisone.  -Ordered Symbicort; 1 puff twice daily then as needed for cough, wheezing or shortness of breath (ie maintenance + reliever regimen MART therapy) "   -Discontinue Advair  -Ordered prescription for prednisone.  Instructed to begin after 48 hours if no improvement with the use of Symbicort.  She has a history of chronic steroid use and would like to cut back on systemic steroids if possible  -Continue albuterol as needed  -Continue Singulair, nasal steroid and daily Zyrtec  -Instructed to resume daily omeprazole  - Return to urgent care any new/worsening symptoms or further questions or concerns.  Patient understood everything discussed.  All questions were answered.    3) 2/2 Augmentin which she just completed  -Ordered Diflucan      Differential diagnosis, natural history, supportive care, and indications for immediate follow-up discussed. All questions answered. Patient agrees with the plan of care.    Follow-up as needed if symptoms worsen or fail to improve to PCP, Urgent care or Emergency Room.    >1 chronic issue with acute exacerbation, ordered prescription medications    Please note that this dictation was created using voice recognition software. I have made a reasonable attempt to correct obvious errors, but I expect that there are errors of grammar and possibly content that I did not discover before finalizing the note.

## 2023-05-30 DIAGNOSIS — J01.00 ACUTE NON-RECURRENT MAXILLARY SINUSITIS: ICD-10-CM

## 2023-06-01 RX ORDER — BECLOMETHASONE DIPROPIONATE 80 UG/1
1 AEROSOL, METERED NASAL DAILY
Qty: 10.6 G | Refills: 3 | Status: SHIPPED | OUTPATIENT
Start: 2023-06-01 | End: 2023-09-14 | Stop reason: SDUPTHER

## 2023-08-22 ENCOUNTER — APPOINTMENT (RX ONLY)
Dept: URBAN - METROPOLITAN AREA CLINIC 15 | Facility: CLINIC | Age: 58
Setting detail: DERMATOLOGY
End: 2023-08-22

## 2023-08-22 DIAGNOSIS — D18.0 HEMANGIOMA: ICD-10-CM

## 2023-08-22 DIAGNOSIS — L81.4 OTHER MELANIN HYPERPIGMENTATION: ICD-10-CM

## 2023-08-22 DIAGNOSIS — D22 MELANOCYTIC NEVI: ICD-10-CM

## 2023-08-22 DIAGNOSIS — L82.1 OTHER SEBORRHEIC KERATOSIS: ICD-10-CM

## 2023-08-22 PROBLEM — D22.5 MELANOCYTIC NEVI OF TRUNK: Status: ACTIVE | Noted: 2023-08-22

## 2023-08-22 PROBLEM — D48.5 NEOPLASM OF UNCERTAIN BEHAVIOR OF SKIN: Status: ACTIVE | Noted: 2023-08-22

## 2023-08-22 PROBLEM — D22.39 MELANOCYTIC NEVI OF OTHER PARTS OF FACE: Status: ACTIVE | Noted: 2023-08-22

## 2023-08-22 PROBLEM — D18.01 HEMANGIOMA OF SKIN AND SUBCUTANEOUS TISSUE: Status: ACTIVE | Noted: 2023-08-22

## 2023-08-22 PROCEDURE — ? BIOPSY BY SHAVE METHOD

## 2023-08-22 PROCEDURE — ? COUNSELING

## 2023-08-22 PROCEDURE — ? PATIENT SPECIFIC COUNSELING

## 2023-08-22 PROCEDURE — 11102 TANGNTL BX SKIN SINGLE LES: CPT

## 2023-08-22 PROCEDURE — ? OBSERVATION AND MEASURE

## 2023-08-22 PROCEDURE — 99213 OFFICE O/P EST LOW 20 MIN: CPT | Mod: 25

## 2023-08-22 ASSESSMENT — LOCATION ZONE DERM
LOCATION ZONE: FACE
LOCATION ZONE: HAND
LOCATION ZONE: TRUNK

## 2023-08-22 ASSESSMENT — LOCATION SIMPLE DESCRIPTION DERM
LOCATION SIMPLE: RIGHT HAND
LOCATION SIMPLE: LEFT CHEEK
LOCATION SIMPLE: RIGHT BUTTOCK
LOCATION SIMPLE: LEFT BUTTOCK
LOCATION SIMPLE: UPPER BACK

## 2023-08-22 ASSESSMENT — LOCATION DETAILED DESCRIPTION DERM
LOCATION DETAILED: INFERIOR THORACIC SPINE
LOCATION DETAILED: RIGHT BUTTOCK
LOCATION DETAILED: LEFT INFERIOR MEDIAL MALAR CHEEK
LOCATION DETAILED: LEFT BUTTOCK
LOCATION DETAILED: RIGHT RADIAL DORSAL HAND

## 2023-09-11 DIAGNOSIS — F32.89 OTHER DEPRESSION: ICD-10-CM

## 2023-09-11 NOTE — TELEPHONE ENCOUNTER
Received request via: Pharmacy    Was the patient seen in the last year in this department? Yes    Does the patient have an active prescription (recently filled or refills available) for medication(s) requested? No    Does the patient have care home Plus and need 100 day supply (blood pressure, diabetes and cholesterol meds only)? Patient does not have SCP    90 tablets

## 2023-09-12 RX ORDER — CITALOPRAM 20 MG/1
20 TABLET ORAL DAILY
Qty: 90 TABLET | Refills: 0 | Status: SHIPPED | OUTPATIENT
Start: 2023-09-12 | End: 2024-02-23 | Stop reason: SDUPTHER

## 2023-09-14 DIAGNOSIS — J01.00 ACUTE NON-RECURRENT MAXILLARY SINUSITIS: ICD-10-CM

## 2023-09-15 RX ORDER — BECLOMETHASONE DIPROPIONATE 80 UG/1
1 AEROSOL, METERED NASAL DAILY
Qty: 10.6 G | Refills: 3 | Status: SHIPPED | OUTPATIENT
Start: 2023-09-15 | End: 2024-02-14

## 2023-10-02 DIAGNOSIS — R10.13 DYSPEPSIA: ICD-10-CM

## 2023-10-02 RX ORDER — OMEPRAZOLE 40 MG/1
40 CAPSULE, DELAYED RELEASE ORAL DAILY
Qty: 90 CAPSULE | Refills: 0 | Status: SHIPPED | OUTPATIENT
Start: 2023-10-02

## 2023-10-03 DIAGNOSIS — J45.40 MODERATE PERSISTENT ASTHMA WITHOUT COMPLICATION: ICD-10-CM

## 2023-10-03 RX ORDER — MONTELUKAST SODIUM 10 MG/1
10 TABLET ORAL DAILY
Qty: 90 TABLET | Refills: 1 | Status: SHIPPED | OUTPATIENT
Start: 2023-10-03

## 2023-10-13 ENCOUNTER — OFFICE VISIT (OUTPATIENT)
Dept: SLEEP MEDICINE | Facility: MEDICAL CENTER | Age: 58
End: 2023-10-13
Attending: PREVENTIVE MEDICINE
Payer: COMMERCIAL

## 2023-10-13 VITALS
BODY MASS INDEX: 29.89 KG/M2 | HEART RATE: 84 BPM | WEIGHT: 186 LBS | SYSTOLIC BLOOD PRESSURE: 124 MMHG | HEIGHT: 66 IN | DIASTOLIC BLOOD PRESSURE: 84 MMHG | RESPIRATION RATE: 16 BRPM | OXYGEN SATURATION: 94 %

## 2023-10-13 DIAGNOSIS — G47.33 OSA ON CPAP: ICD-10-CM

## 2023-10-13 PROCEDURE — 99213 OFFICE O/P EST LOW 20 MIN: CPT | Performed by: PREVENTIVE MEDICINE

## 2023-10-13 PROCEDURE — 3074F SYST BP LT 130 MM HG: CPT | Performed by: PREVENTIVE MEDICINE

## 2023-10-13 PROCEDURE — 99214 OFFICE O/P EST MOD 30 MIN: CPT | Performed by: PREVENTIVE MEDICINE

## 2023-10-13 PROCEDURE — 3079F DIAST BP 80-89 MM HG: CPT | Performed by: PREVENTIVE MEDICINE

## 2023-10-13 ASSESSMENT — FIBROSIS 4 INDEX: FIB4 SCORE: 1.2

## 2023-10-13 NOTE — PROGRESS NOTES
CHIEF COMPLAINT: Compliance check/Annual Visit/First Compliance  LAST SEEN: Dr. Hampton on 3/17/2023  HISTORY OF PRESENT ILLNESS:  Kaley Dean is a 58 y.o.female   who is here today to follow-up  for GERARDO.  This patient reports that she takes off the mask sometimes in the middle of the night and is not aware of it.    When she wakes up in the middle of the night she also has a difficult time getting back to sleep.         COMPLIANCE DATA greater than 4 hours: 73 %  Machine type:  AirSense 10 autoSet  Date range:  8/2-8/31/2023  AHI:  1.4  TIME USED: 7 hrs. 50 mins  PRESSURE SETTINGS:  min 5, max 10 cm H2O  LEAK:  excessive at 28L/min  DME:  [employer]- Set up date 4/19/2023    This patient is using PAP therapy consistently and is benefiting from it but she still needs a few changes.  She will need a mask fit to ensure a better seal and her pressures will need to be changes somewhat as follows.  Min pressure will be increased to 6 and the maximum pressure increased to 12 cm H2O.  She does report improved sleep at night and waking up more refreshed.  She also has met all insurance requirements for compliance.    Significant comorbidities and modifying factors: see below    PAST MEDICAL HISTORY:  Past Medical History:   Diagnosis Date    Asthma 08/28/2018    with inhalers    Chickenpox     COVID 12/30/2022    Depression     GERD (gastroesophageal reflux disease)     [place] measles     Hyperlipidemia     Influenza     Mumps     Pain 08/28/2018    hands, arms, shoulder and neck    Tonsillitis       PROBLEM LIST:  Patient Active Problem List    Diagnosis Date Noted    Sinus pressure 01/19/2023    Hepatic steatosis 12/30/2022    Transaminitis 12/30/2022    Dyspepsia 11/10/2022    HSV-2 (herpes simplex virus 2) infection 11/10/2022    Joint pain in both hands 11/10/2022    Other fatigue 11/10/2022    Trigger index finger of right hand 04/13/2022    Trigger ring finger of right hand 04/13/2022    Primary osteoarthritis of  first carpometacarpal joint of right hand 10/18/2021    Primary osteoarthritis of first carpometacarpal joint of left hand 10/18/2021    Vitamin D deficiency 2019    Personal history of colonic polyps 2016    Acne rosacea 2015    Bilateral dry eyes 2015    Menopause 2015    Hemorrhoids 2015    Allergic rhinitis 2015    Mixed hyperlipidemia 2015    Moderate persistent asthma without complication 2015    Decreased hearing of both ears 2015     PAST SOCIAL HISTORY:  Past Surgical History:   Procedure Laterality Date    CERVICAL DISK AND FUSION ANTERIOR N/A 09/10/2018    Procedure: CERVICAL DISK AND FUSION ANTERIOR- C5-7 WITH PLATE;  Surgeon: Wallace Zamora M.D.;  Location: [facility];  Service: Neurosurgery    TUBAL COAGULATION LAPAROSCOPIC BILATERAL  1994    LAMINOTOMY       PAST FAMILY HISTORY:  Family History   Problem Relation Age of Onset    Hypertension Mother     Kidney Disease Mother     Respiratory Disease Mother         COPD    Stroke Mother     Cancer Father     Heart Disease Brother      SOCIAL HISTORY:  Social History     Socioeconomic History    Marital status:      Spouse name: Not on file    Number of children: Not on file    Years of education: Not on file    Highest education level: Some college, no degree   Occupational History    Not on file   Tobacco Use    Smoking status: Former     Current packs/day: 0.00     Average packs/day: 2.0 packs/day for 12.0 years (24.0 ttl pk-yrs)     Types: Cigarettes     Start date: 1975     Quit date: 1987     Years since quittin.8    Smokeless tobacco: Never   Vaping Use    Vaping Use: Never used   Substance and Sexual Activity    Alcohol use: Yes     Alcohol/week: 1.2 oz     Types: 2 Glasses of wine per week     Comment: 1-5 per week    Drug use: No    Sexual activity: Yes     Partners: Male     Birth control/protection: Surgical   Other Topics Concern    Not on  file   Social History Narrative    Not on file     Social Determinants of Health     Financial Resource Strain: Low Risk  (11/10/2022)    Overall Financial Resource Strain (CARDIA)     Difficulty of Paying Living Expenses: Not hard at all   Food Insecurity: No Food Insecurity (11/10/2022)    Hunger Vital Sign     Worried About Running Out of Food in the Last Year: Never true     Ran Out of Food in the Last Year: Never true   Transportation Needs: No Transportation Needs (11/10/2022)    PRAPARE - Transportation     Lack of Transportation (Medical): No     Lack of Transportation (Non-Medical): No   Physical Activity: Sufficiently Active (11/10/2022)    Exercise Vital Sign     Days of Exercise per Week: 3 days     Minutes of Exercise per Session: 50 min   Stress: Stress Concern Present (11/10/2022)    [employer] - Occupational Stress Questionnaire     Feeling of Stress : To some extent   Social Connections: Moderately Isolated (11/10/2022)    Social Connection and Isolation Panel [NHANES]     Frequency of Communication with Friends and Family: Three times a week     Frequency of Social Gatherings with Friends and Family: Twice a week     Attends Jewish Services: Never     Active Member of Clubs or Organizations: No     Attends Club or Organization Meetings: Never     Marital Status:    Intimate Partner Violence: Not on file   Housing Stability: Low Risk  (11/10/2022)    Housing Stability Vital Sign     Unable to Pay for Housing in the Last Year: No     Number of Places Lived in the Last Year: 1     Unstable Housing in the Last Year: No     ALLERGIES: Miacalcin [kdc:calcitonin+chlorobutanol], Simvastatin, and Sulfa drugs  MEDICATIONS:  Current Outpatient Medications   Medication Sig Dispense Refill    montelukast (SINGULAIR) 10 MG Tab Take 1 Tablet by mouth every day. 90 Tablet 1    omeprazole (PRILOSEC) 40 MG delayed-release capsule Take 1 Capsule by mouth every day. 90 Capsule 0     Beclomethasone Dipropionate (QNASL) 80 MCG/ACT Aero Soln Administer 1 Spray into affected nostril(S) every day. 10.6 g 3    citalopram (CELEXA) 20 MG Tab Take 1 Tablet by mouth every day. 90 Tablet 0    cyclobenzaprine (FLEXERIL) 5 mg tablet Take 1-2 Tablets by mouth at bedtime. 12 Tablet 0    benzonatate (TESSALON) 100 MG Cap Take 200 mg by mouth 3 times a day as needed.      valACYclovir (VALTREX) 500 MG Tab Take 500 mg by mouth every day.      budesonide-formoterol (SYMBICORT) 160-4.5 MCG/ACT Aerosol Take 1 puff twice daily for maintenance therapy.  Take 1 puff every 4-6 hours as needed for wheezing or shortness of breath. 1 Each 1    albuterol 108 (90 Base) MCG/ACT Aero Soln inhalation aerosol Inhale 2 Puffs every four hours as needed for Shortness of Breath. 6.7 g 3    fluticasone-salmeterol (ADVAIR) 500-50 MCG/ACT AEROSOL POWDER, BREATH ACTIVATED Inhale 1 Puff every 12 hours. 60 Each 11    doxycycline (VIBRAMYCIN) 100 MG Cap Take 100 mg by mouth 2 times a day.      budesonide (PULMICORT) 0.5 MG/2ML Suspension NEBULIZE AND INHALE THE CONTENTS OF 1 VIAL TWICE DAILY AS DIRECTED.      triamcinolone acetonide (KENALOG) 0.1 % Cream APPLY TO THE AFFECTED AREA(S) ON LEGS TWICE DAILY AS NEEDED FOR RASH.      celecoxib (CELEBREX) 200 MG Cap Take 1 Capsule by mouth 2 times a day. 30 Capsule 1    zolpidem (AMBIEN) 5 MG Tab Take 1 Tablet by mouth at bedtime as needed for Sleep (for sleep study only) for up to 2 doses. One day supply. 2 Tablet 0    pravastatin (PRAVACHOL) 40 MG tablet Take 1 Tablet by mouth every evening. 90 Tablet 1    tretinoin (RETIN-A) 0.1 % cream Apply topically to affected area at night. 45 g 3    clindamycin-benzoyl peroxide (BENZACLIN) gel Apply 1 Application topically 2 times a day. 50 g 3    progesterone (PROMETRIUM) 100 MG Cap       estradiol (CLIMARA) 0.0375 MG/24HR patch Place 1 Patch on the skin every 7 days. 12 Patch 3    Magnesium 500 MG Cap Take  by mouth.      RESTASIS 0.05 % ophthalmic  "emulsion       Spacer/Aero Chamber Mouthpiece Misc 1 Device by Does not apply route as needed. 1 Device 0    cetirizine (ZYRTEC) 10 MG TABS Take 1 Tablet by mouth every day.      vitamin D (CHOLECALCIFEROL) 1000 UNIT TABS Take 2 Tablets by mouth every day.      Multiple Vitamins-Minerals (OCUVITE PO) Take 1 Tab by mouth every evening.      methylPREDNISolone (MEDROL DOSEPAK) 4 MG Tablet Therapy Pack Follow schedule on package instructions. 21 Tablet 0    amoxicillin-clavulanate (AUGMENTIN) 875-125 MG Tab TAKE ONE TABLET BY MOUTH EVERY TWELVE HOURS.      predniSONE (DELTASONE) 20 MG Tab TAKE TWO TABLETS BY MOUTH EVERY DAY FOR 2 DAYS, THEN ONE TABLET EVERY DAY FOR FOUR DAYS.      fluconazole (DIFLUCAN) 150 MG tablet Take one tab today. Take 2nd tab in 3 days if still symptomatic 2 Tablet 0     No current facility-administered medications for this visit.    \"CURRENT RX\"    REVIEW OF SYSTEMS:  SEE HPI      PHYSICAL EXAM/VITALS:  /84 (BP Location: Left arm, Patient Position: Sitting, BP Cuff Size: Adult)   Pulse 84   Resp 16   Ht 1.676 m (5' 6\")   Wt 84.4 kg (186 lb)   LMP 06/29/2019 (Approximate)   SpO2 94%   BMI 30.02 kg/m²   Appearance: Well-nourished, well-developed,  looks stated age, no acute distress  Eyes:   EOMI  ENMT:  WNL  Neck: Supple, trachea midline  Respiratory effort:  No intercostal retractions or use of accessory muscles  Musculoskeletal:  Grossly normal; gait and station normal  Neurologic:  oriented to person, time, place, and purpose; judgement intact  Psychiatric:  No depression, anxiety, agitation    MEDICAL DECISION MAKING:  The medical record was reviewed as it pertains to this referral. This includes records from primary care,consultants notes, referral request, hospital records, labs and imaging. Any available diagnostic and titration nocturnal polysomnograms, home sleep apnea tests, continuous nocturnal oximetry results, multiple sleep latency tests, and recent compliance reports " were reviewed with the patient.    ASSESSMENT/PLAN:  Kaley Dean is a 58 y.o.female who is doing well on PAP therapy.   She has met all insurance requirements for compliance.      DIAGNOSES :    1. GERARDO on CPAP  - DME Mask Fitting; Future  - DME Mask and Supplies        The risks of untreated sleep apnea were discussed with the patient at length. Patients with GERARDO are at increased risk of cardiovascular disease including coronary artery disease, systemic arterial hypertension, pulmonary arterial hypertension, cardiac arrhythmias, and stroke. GERARDO patients have an increased risk of motor vehicle accidents, type 2 diabetes, chronic kidney disease, and non-alcoholic liver disease. The patient was advised to avoid driving a motor vehicle when drowsy.  Have advised the patient to follow up with the appropriate healthcare practitioners for all other medical problems and issues.    RETURN TO CLINIC: Return in about 6 months (around 4/13/2024) for Compliance check, With Dr Hampton.    My total time spent caring for the patient on the day of the encounter was 40 minutes. This includes time spent on a thorough chart review including other physician notes, all sleep studies, as well as critical labs and pulmonary and cardiac studies.  Additionally, it includes a thorough discussion of good sleep hygiene and stimulus control, as well as  the need for consistency in terms of sleep preparation and practice.    Please note that this dictation was created using voice recognition software.  I have made every reasonable attempt to correct obvious errors, I expect that there are errors of grammar and possibly content that I did not discover before finalizing this note.

## 2023-11-07 ENCOUNTER — APPOINTMENT (RX ONLY)
Dept: URBAN - METROPOLITAN AREA CLINIC 15 | Facility: CLINIC | Age: 58
Setting detail: DERMATOLOGY
End: 2023-11-07

## 2023-11-07 DIAGNOSIS — L56.5 DISSEMINATED SUPERFICIAL ACTINIC POROKERATOSIS (DSAP): ICD-10-CM

## 2023-11-07 PROCEDURE — ? ADDITIONAL NOTES

## 2023-11-07 PROCEDURE — ? LIQUID NITROGEN

## 2023-11-07 PROCEDURE — 17000 DESTRUCT PREMALG LESION: CPT

## 2023-11-07 PROCEDURE — ? COUNSELING

## 2023-11-07 ASSESSMENT — LOCATION ZONE DERM
LOCATION ZONE: LEG
LOCATION ZONE: LEG

## 2023-11-07 ASSESSMENT — LOCATION DETAILED DESCRIPTION DERM
LOCATION DETAILED: LEFT ANTERIOR DISTAL THIGH
LOCATION DETAILED: LEFT ANTERIOR DISTAL THIGH

## 2023-11-07 ASSESSMENT — LOCATION SIMPLE DESCRIPTION DERM
LOCATION SIMPLE: LEFT THIGH
LOCATION SIMPLE: LEFT THIGH

## 2023-11-07 NOTE — PROCEDURE: ADDITIONAL NOTES
Additional Notes: [id number], treated today with LN2.
Render Risk Assessment In Note?: no
Detail Level: Detailed

## 2023-11-07 NOTE — PROCEDURE: LIQUID NITROGEN
Duration Of Freeze Thaw-Cycle (Seconds): 3
Post-Care Instructions: I reviewed with the patient in detail post-care instructions. Patient is to wear sunprotection, and avoid picking at any of the treated lesions. Pt may apply Vaseline to crusted or scabbing areas.
Aperture Size (Optional): A
Render Post-Care Instructions In Note?: yes
Consent: The patient's consent was obtained including but not limited to risks of crusting, scabbing, blistering, scarring, darker or lighter pigmentary change, recurrence, incomplete removal and infection.
Number Of Freeze-Thaw Cycles: 1 freeze-thaw cycle
Render Note In Bullet Format When Appropriate: No
Detail Level: Detailed

## 2024-02-14 PROBLEM — M18.10 DEGENERATIVE ARTHRITIS OF THUMB: Status: ACTIVE | Noted: 2024-02-14

## 2024-02-23 DIAGNOSIS — E78.2 MIXED HYPERLIPIDEMIA: ICD-10-CM

## 2024-02-23 DIAGNOSIS — F32.89 OTHER DEPRESSION: ICD-10-CM

## 2024-02-23 RX ORDER — PRAVASTATIN SODIUM 40 MG
40 TABLET ORAL NIGHTLY
Qty: 30 TABLET | Refills: 0 | Status: SHIPPED | OUTPATIENT
Start: 2024-02-23

## 2024-02-23 RX ORDER — CITALOPRAM 20 MG/1
20 TABLET ORAL DAILY
Qty: 30 TABLET | Refills: 0 | Status: SHIPPED | OUTPATIENT
Start: 2024-02-23

## 2024-02-23 NOTE — TELEPHONE ENCOUNTER
Received request via: Pharmacy    Was the patient seen in the last year in this department? Yes    Does the patient have an active prescription (recently filled or refills available) for medication(s) requested? No    Pharmacy Name: [facility]    Does the patient have [facility] Plus and need 100 day supply (blood pressure, diabetes and cholesterol meds only)? Patient does not have SCP

## 2024-04-08 ENCOUNTER — PATIENT MESSAGE (OUTPATIENT)
Dept: SLEEP MEDICINE | Facility: MEDICAL CENTER | Age: 59
End: 2024-04-08
Payer: COMMERCIAL

## 2024-04-12 ENCOUNTER — APPOINTMENT (OUTPATIENT)
Dept: SLEEP MEDICINE | Facility: MEDICAL CENTER | Age: 59
End: 2024-04-12
Attending: PREVENTIVE MEDICINE
Payer: COMMERCIAL

## 2024-05-29 DIAGNOSIS — R10.13 DYSPEPSIA: ICD-10-CM

## 2024-05-29 DIAGNOSIS — J45.40 MODERATE PERSISTENT ASTHMA WITHOUT COMPLICATION: ICD-10-CM

## 2024-05-29 DIAGNOSIS — F32.89 OTHER DEPRESSION: ICD-10-CM

## 2024-05-29 NOTE — TELEPHONE ENCOUNTER
Received request via: Pharmacy    Was the patient seen in the last year in this department? Yes    Does the patient have an active prescription (recently filled or refills available) for medication(s) requested? No    Pharmacy Name: [facility]     Does the patient have [facility] Plus and need 100 day supply (blood pressure, diabetes and cholesterol meds only)? Patient does not have SCP

## 2024-05-30 RX ORDER — FLUTICASONE PROPIONATE AND SALMETEROL 500; 50 UG/1; UG/1
1 POWDER RESPIRATORY (INHALATION) EVERY 12 HOURS
Qty: 60 EACH | Refills: 11 | Status: SHIPPED | OUTPATIENT
Start: 2024-05-30

## 2024-05-30 RX ORDER — OMEPRAZOLE 40 MG/1
40 CAPSULE, DELAYED RELEASE ORAL DAILY
Qty: 90 CAPSULE | Refills: 0 | Status: SHIPPED | OUTPATIENT
Start: 2024-05-30

## 2024-05-30 RX ORDER — MONTELUKAST SODIUM 10 MG/1
10 TABLET ORAL DAILY
Qty: 90 TABLET | Refills: 0 | Status: SHIPPED | OUTPATIENT
Start: 2024-05-30

## 2024-05-30 RX ORDER — CITALOPRAM 20 MG/1
20 TABLET ORAL DAILY
Qty: 90 TABLET | Refills: 0 | Status: SHIPPED | OUTPATIENT
Start: 2024-05-30

## 2024-06-21 ENCOUNTER — APPOINTMENT (OUTPATIENT)
Dept: SLEEP MEDICINE | Facility: MEDICAL CENTER | Age: 59
End: 2024-06-21
Attending: PREVENTIVE MEDICINE
Payer: COMMERCIAL

## 2024-07-09 SDOH — ECONOMIC STABILITY: INCOME INSECURITY: IN THE LAST 12 MONTHS, WAS THERE A TIME WHEN YOU WERE NOT ABLE TO PAY THE MORTGAGE OR RENT ON TIME?: NO

## 2024-07-09 SDOH — HEALTH STABILITY: PHYSICAL HEALTH: ON AVERAGE, HOW MANY DAYS PER WEEK DO YOU ENGAGE IN MODERATE TO STRENUOUS EXERCISE (LIKE A BRISK WALK)?: 2 DAYS

## 2024-07-09 SDOH — HEALTH STABILITY: PHYSICAL HEALTH: ON AVERAGE, HOW MANY MINUTES DO YOU ENGAGE IN EXERCISE AT THIS LEVEL?: 20 MIN

## 2024-07-09 SDOH — HEALTH STABILITY: MENTAL HEALTH
STRESS IS WHEN SOMEONE FEELS TENSE, NERVOUS, ANXIOUS, OR CAN'T SLEEP AT NIGHT BECAUSE THEIR MIND IS TROUBLED. HOW STRESSED ARE YOU?: RATHER MUCH

## 2024-07-09 SDOH — ECONOMIC STABILITY: FOOD INSECURITY: WITHIN THE PAST 12 MONTHS, YOU WORRIED THAT YOUR FOOD WOULD RUN OUT BEFORE YOU GOT MONEY TO BUY MORE.: NEVER TRUE

## 2024-07-09 SDOH — ECONOMIC STABILITY: INCOME INSECURITY: HOW HARD IS IT FOR YOU TO PAY FOR THE VERY BASICS LIKE FOOD, HOUSING, MEDICAL CARE, AND HEATING?: NOT HARD AT ALL

## 2024-07-09 SDOH — ECONOMIC STABILITY: HOUSING INSECURITY: IN THE LAST 12 MONTHS, HOW MANY PLACES HAVE YOU LIVED?: 1

## 2024-07-09 SDOH — ECONOMIC STABILITY: FOOD INSECURITY: WITHIN THE PAST 12 MONTHS, THE FOOD YOU BOUGHT JUST DIDN'T LAST AND YOU DIDN'T HAVE MONEY TO GET MORE.: NEVER TRUE

## 2024-07-09 ASSESSMENT — SOCIAL DETERMINANTS OF HEALTH (SDOH)
DO YOU BELONG TO ANY CLUBS OR ORGANIZATIONS SUCH AS CHURCH GROUPS UNIONS, FRATERNAL OR ATHLETIC GROUPS, OR SCHOOL GROUPS?: NO
HOW OFTEN DO YOU HAVE A DRINK CONTAINING ALCOHOL: 4 OR MORE TIMES A WEEK
HOW OFTEN DO YOU HAVE SIX OR MORE DRINKS ON ONE OCCASION: NEVER
HOW OFTEN DO YOU ATTEND CHURCH OR RELIGIOUS SERVICES?: NEVER
HOW OFTEN DO YOU GET TOGETHER WITH FRIENDS OR RELATIVES?: MORE THAN THREE TIMES A WEEK
DO YOU BELONG TO ANY CLUBS OR ORGANIZATIONS SUCH AS CHURCH GROUPS UNIONS, FRATERNAL OR ATHLETIC GROUPS, OR SCHOOL GROUPS?: NO
HOW HARD IS IT FOR YOU TO PAY FOR THE VERY BASICS LIKE FOOD, HOUSING, MEDICAL CARE, AND HEATING?: NOT HARD AT ALL
HOW OFTEN DO YOU ATTEND CHURCH OR RELIGIOUS SERVICES?: NEVER
HOW OFTEN DO YOU ATTENT MEETINGS OF THE CLUB OR ORGANIZATION YOU BELONG TO?: NEVER
HOW OFTEN DO YOU GET TOGETHER WITH FRIENDS OR RELATIVES?: MORE THAN THREE TIMES A WEEK
HOW MANY DRINKS CONTAINING ALCOHOL DO YOU HAVE ON A TYPICAL DAY WHEN YOU ARE DRINKING: 1 OR 2
IN THE PAST 12 MONTHS, HAS THE ELECTRIC, GAS, OIL, OR WATER COMPANY THREATENED TO SHUT OFF SERVICE IN YOUR HOME?: NO
IN A TYPICAL WEEK, HOW MANY TIMES DO YOU TALK ON THE PHONE WITH FAMILY, FRIENDS, OR NEIGHBORS?: MORE THAN THREE TIMES A WEEK
IN A TYPICAL WEEK, HOW MANY TIMES DO YOU TALK ON THE PHONE WITH FAMILY, FRIENDS, OR NEIGHBORS?: MORE THAN THREE TIMES A WEEK
HOW OFTEN DO YOU ATTENT MEETINGS OF THE CLUB OR ORGANIZATION YOU BELONG TO?: NEVER
WITHIN THE PAST 12 MONTHS, YOU WORRIED THAT YOUR FOOD WOULD RUN OUT BEFORE YOU GOT THE MONEY TO BUY MORE: NEVER TRUE

## 2024-07-09 ASSESSMENT — LIFESTYLE VARIABLES
HOW MANY STANDARD DRINKS CONTAINING ALCOHOL DO YOU HAVE ON A TYPICAL DAY: 1 OR 2
HOW OFTEN DO YOU HAVE SIX OR MORE DRINKS ON ONE OCCASION: NEVER
SKIP TO QUESTIONS 9-10: 1
HOW OFTEN DO YOU HAVE A DRINK CONTAINING ALCOHOL: 4 OR MORE TIMES A WEEK
AUDIT-C TOTAL SCORE: 4

## 2024-07-11 ENCOUNTER — APPOINTMENT (OUTPATIENT)
Dept: MEDICAL GROUP | Facility: IMAGING CENTER | Age: 59
End: 2024-07-11
Payer: COMMERCIAL

## 2024-07-11 VITALS
DIASTOLIC BLOOD PRESSURE: 76 MMHG | HEART RATE: 77 BPM | HEIGHT: 66 IN | RESPIRATION RATE: 16 BRPM | TEMPERATURE: 98.8 F | OXYGEN SATURATION: 95 % | SYSTOLIC BLOOD PRESSURE: 128 MMHG | BODY MASS INDEX: 29.77 KG/M2 | WEIGHT: 185.2 LBS

## 2024-07-11 DIAGNOSIS — M25.511 CHRONIC RIGHT SHOULDER PAIN: ICD-10-CM

## 2024-07-11 DIAGNOSIS — Z13.220 SCREENING CHOLESTEROL LEVEL: ICD-10-CM

## 2024-07-11 DIAGNOSIS — Z00.00 WELLNESS EXAMINATION: ICD-10-CM

## 2024-07-11 DIAGNOSIS — Z13.29 SCREENING FOR THYROID DISORDER: ICD-10-CM

## 2024-07-11 DIAGNOSIS — E55.9 VITAMIN D DEFICIENCY: ICD-10-CM

## 2024-07-11 DIAGNOSIS — Z13.21 ENCOUNTER FOR VITAMIN DEFICIENCY SCREENING: ICD-10-CM

## 2024-07-11 DIAGNOSIS — F33.42 RECURRENT MAJOR DEPRESSIVE DISORDER, IN FULL REMISSION (HCC): ICD-10-CM

## 2024-07-11 DIAGNOSIS — Z13.1 DIABETES MELLITUS SCREENING: ICD-10-CM

## 2024-07-11 DIAGNOSIS — G89.29 CHRONIC RIGHT SHOULDER PAIN: ICD-10-CM

## 2024-07-11 DIAGNOSIS — R10.13 DYSPEPSIA: ICD-10-CM

## 2024-07-11 DIAGNOSIS — Z78.0 MENOPAUSE: ICD-10-CM

## 2024-07-11 DIAGNOSIS — Z11.4 SCREENING FOR HIV (HUMAN IMMUNODEFICIENCY VIRUS): ICD-10-CM

## 2024-07-11 DIAGNOSIS — F41.9 ANXIETY: ICD-10-CM

## 2024-07-11 DIAGNOSIS — R74.01 TRANSAMINITIS: ICD-10-CM

## 2024-07-11 DIAGNOSIS — J45.40 MODERATE PERSISTENT ASTHMA WITHOUT COMPLICATION: ICD-10-CM

## 2024-07-11 DIAGNOSIS — Z13.0 SCREENING FOR DEFICIENCY ANEMIA: ICD-10-CM

## 2024-07-11 DIAGNOSIS — R19.8 IRREGULAR BOWEL HABITS: ICD-10-CM

## 2024-07-11 DIAGNOSIS — E78.2 MIXED HYPERLIPIDEMIA: ICD-10-CM

## 2024-07-11 PROBLEM — J34.89 SINUS PRESSURE: Status: RESOLVED | Noted: 2023-01-19 | Resolved: 2024-07-11

## 2024-07-11 PROCEDURE — 99214 OFFICE O/P EST MOD 30 MIN: CPT | Mod: 25 | Performed by: PHYSICIAN ASSISTANT

## 2024-07-11 PROCEDURE — 99396 PREV VISIT EST AGE 40-64: CPT | Performed by: PHYSICIAN ASSISTANT

## 2024-07-11 RX ORDER — ALBUTEROL SULFATE 90 UG/1
2 AEROSOL, METERED RESPIRATORY (INHALATION) EVERY 4 HOURS PRN
Qty: 6.7 G | Refills: 3 | Status: SHIPPED | OUTPATIENT
Start: 2024-07-11

## 2024-07-11 RX ORDER — MONTELUKAST SODIUM 10 MG/1
10 TABLET ORAL DAILY
Qty: 90 TABLET | Refills: 3 | Status: SHIPPED | OUTPATIENT
Start: 2024-07-11

## 2024-07-11 RX ORDER — PRAVASTATIN SODIUM 40 MG
40 TABLET ORAL NIGHTLY
Qty: 90 TABLET | Refills: 3 | Status: SHIPPED | OUTPATIENT
Start: 2024-07-11

## 2024-07-11 RX ORDER — METHYLPREDNISOLONE 4 MG/1
4 TABLET ORAL EVERY MORNING
COMMUNITY
Start: 2024-07-10

## 2024-07-11 RX ORDER — OMEPRAZOLE 40 MG/1
40 CAPSULE, DELAYED RELEASE ORAL DAILY
Qty: 90 CAPSULE | Refills: 3 | Status: SHIPPED | OUTPATIENT
Start: 2024-07-11

## 2024-07-11 RX ORDER — DULOXETIN HYDROCHLORIDE 30 MG/1
30 CAPSULE, DELAYED RELEASE ORAL DAILY
Qty: 30 CAPSULE | Refills: 0 | Status: SHIPPED | OUTPATIENT
Start: 2024-07-11

## 2024-07-11 ASSESSMENT — PATIENT HEALTH QUESTIONNAIRE - PHQ9: CLINICAL INTERPRETATION OF PHQ2 SCORE: 0

## 2024-07-11 ASSESSMENT — FIBROSIS 4 INDEX: FIB4 SCORE: 1.2

## 2024-08-05 ENCOUNTER — APPOINTMENT (OUTPATIENT)
Dept: MEDICAL GROUP | Facility: IMAGING CENTER | Age: 59
End: 2024-08-05
Payer: COMMERCIAL

## 2024-08-20 ENCOUNTER — APPOINTMENT (RX ONLY)
Dept: URBAN - METROPOLITAN AREA CLINIC 15 | Facility: CLINIC | Age: 59
Setting detail: DERMATOLOGY
End: 2024-08-20

## 2024-09-24 DIAGNOSIS — F33.42 RECURRENT MAJOR DEPRESSIVE DISORDER, IN FULL REMISSION (HCC): ICD-10-CM

## 2024-09-24 DIAGNOSIS — F41.9 ANXIETY: ICD-10-CM

## 2024-09-24 RX ORDER — DULOXETIN HYDROCHLORIDE 30 MG/1
30 CAPSULE, DELAYED RELEASE ORAL DAILY
Qty: 90 CAPSULE | Refills: 0 | Status: SHIPPED | OUTPATIENT
Start: 2024-09-24

## 2024-09-24 NOTE — TELEPHONE ENCOUNTER
Received request via: Pharmacy    Was the patient seen in the last year in this department? Yes    Does the patient have an active prescription (recently filled or refills available) for medication(s) requested? No    Pharmacy Name: [facility]    Does the patient have [facility] Plus and need 100-day supply? (This applies to ALL medications) Patient does not have SCP

## 2024-09-30 ENCOUNTER — APPOINTMENT (OUTPATIENT)
Dept: MEDICAL GROUP | Facility: IMAGING CENTER | Age: 59
End: 2024-09-30
Payer: COMMERCIAL

## 2024-11-15 ENCOUNTER — HOSPITAL ENCOUNTER (OUTPATIENT)
Dept: LAB | Facility: MEDICAL CENTER | Age: 59
End: 2024-11-15
Attending: PHYSICIAN ASSISTANT
Payer: COMMERCIAL

## 2024-11-15 ENCOUNTER — APPOINTMENT (OUTPATIENT)
Dept: MEDICAL GROUP | Facility: IMAGING CENTER | Age: 59
End: 2024-11-15
Payer: COMMERCIAL

## 2024-11-15 DIAGNOSIS — E55.9 VITAMIN D DEFICIENCY: ICD-10-CM

## 2024-11-15 DIAGNOSIS — Z13.1 DIABETES MELLITUS SCREENING: ICD-10-CM

## 2024-11-15 DIAGNOSIS — Z13.220 SCREENING CHOLESTEROL LEVEL: ICD-10-CM

## 2024-11-15 DIAGNOSIS — R74.01 TRANSAMINITIS: ICD-10-CM

## 2024-11-15 DIAGNOSIS — R19.8 IRREGULAR BOWEL HABITS: ICD-10-CM

## 2024-11-15 DIAGNOSIS — Z13.29 SCREENING FOR THYROID DISORDER: ICD-10-CM

## 2024-11-15 DIAGNOSIS — Z11.4 SCREENING FOR HIV (HUMAN IMMUNODEFICIENCY VIRUS): ICD-10-CM

## 2024-11-15 DIAGNOSIS — Z13.0 SCREENING FOR DEFICIENCY ANEMIA: ICD-10-CM

## 2024-11-15 DIAGNOSIS — Z13.21 ENCOUNTER FOR VITAMIN DEFICIENCY SCREENING: ICD-10-CM

## 2024-11-15 LAB
25(OH)D3 SERPL-MCNC: 31 NG/ML (ref 30–100)
ALBUMIN SERPL BCP-MCNC: 4.4 G/DL (ref 3.2–4.9)
ALBUMIN/GLOB SERPL: 1.5 G/DL
ALP SERPL-CCNC: 99 U/L (ref 30–99)
ALT SERPL-CCNC: 73 U/L (ref 2–50)
ANION GAP SERPL CALC-SCNC: 12 MMOL/L (ref 7–16)
APPEARANCE UR: ABNORMAL
AST SERPL-CCNC: 49 U/L (ref 12–45)
BACTERIA #/AREA URNS HPF: ABNORMAL /HPF
BASOPHILS # BLD AUTO: 1.2 % (ref 0–1.8)
BASOPHILS # BLD: 0.06 K/UL (ref 0–0.12)
BILIRUB SERPL-MCNC: 1 MG/DL (ref 0.1–1.5)
BILIRUB UR QL STRIP.AUTO: NEGATIVE
BUN SERPL-MCNC: 17 MG/DL (ref 8–22)
CALCIUM ALBUM COR SERPL-MCNC: 9.2 MG/DL (ref 8.5–10.5)
CALCIUM SERPL-MCNC: 9.5 MG/DL (ref 8.5–10.5)
CASTS URNS QL MICRO: ABNORMAL /LPF (ref 0–2)
CHLORIDE SERPL-SCNC: 104 MMOL/L (ref 96–112)
CHOLEST SERPL-MCNC: 279 MG/DL (ref 100–199)
CO2 SERPL-SCNC: 26 MMOL/L (ref 20–33)
COLOR UR: YELLOW
CREAT SERPL-MCNC: 0.57 MG/DL (ref 0.5–1.4)
EOSINOPHIL # BLD AUTO: 0.24 K/UL (ref 0–0.51)
EOSINOPHIL NFR BLD: 4.9 % (ref 0–6.9)
EPITHELIAL CELLS 1715: ABNORMAL /HPF (ref 0–5)
ERYTHROCYTE [DISTWIDTH] IN BLOOD BY AUTOMATED COUNT: 43.9 FL (ref 35.9–50)
GFR SERPLBLD CREATININE-BSD FMLA CKD-EPI: 103 ML/MIN/1.73 M 2
GLOBULIN SER CALC-MCNC: 2.9 G/DL (ref 1.9–3.5)
GLUCOSE SERPL-MCNC: 102 MG/DL (ref 65–99)
GLUCOSE UR STRIP.AUTO-MCNC: NEGATIVE MG/DL
HCT VFR BLD AUTO: 49.7 % (ref 37–47)
HDLC SERPL-MCNC: 53 MG/DL
HGB BLD-MCNC: 16 G/DL (ref 12–16)
HIV 1+2 AB+HIV1 P24 AG SERPL QL IA: NORMAL
IMM GRANULOCYTES # BLD AUTO: 0.02 K/UL (ref 0–0.11)
IMM GRANULOCYTES NFR BLD AUTO: 0.4 % (ref 0–0.9)
KETONES UR STRIP.AUTO-MCNC: NEGATIVE MG/DL
LDLC SERPL CALC-MCNC: 189 MG/DL
LEUKOCYTE ESTERASE UR QL STRIP.AUTO: NEGATIVE
LYMPHOCYTES # BLD AUTO: 1.6 K/UL (ref 1–4.8)
LYMPHOCYTES NFR BLD: 32.9 % (ref 22–41)
MCH RBC QN AUTO: 31 PG (ref 27–33)
MCHC RBC AUTO-ENTMCNC: 32.2 G/DL (ref 32.2–35.5)
MCV RBC AUTO: 96.3 FL (ref 81.4–97.8)
MICRO URNS: ABNORMAL
MONOCYTES # BLD AUTO: 0.55 K/UL (ref 0–0.85)
MONOCYTES NFR BLD AUTO: 11.3 % (ref 0–13.4)
NEUTROPHILS # BLD AUTO: 2.39 K/UL (ref 1.82–7.42)
NEUTROPHILS NFR BLD: 49.3 % (ref 44–72)
NITRITE UR QL STRIP.AUTO: NEGATIVE
NRBC # BLD AUTO: 0 K/UL
NRBC BLD-RTO: 0 /100 WBC (ref 0–0.2)
PH UR STRIP.AUTO: 7.5 [PH] (ref 5–8)
PLATELET # BLD AUTO: 220 K/UL (ref 164–446)
PMV BLD AUTO: 9.8 FL (ref 9–12.9)
POTASSIUM SERPL-SCNC: 4.1 MMOL/L (ref 3.6–5.5)
PROT SERPL-MCNC: 7.3 G/DL (ref 6–8.2)
PROT UR QL STRIP: NEGATIVE MG/DL
RBC # BLD AUTO: 5.16 M/UL (ref 4.2–5.4)
RBC # URNS HPF: ABNORMAL /HPF (ref 0–2)
RBC UR QL AUTO: NEGATIVE
SODIUM SERPL-SCNC: 142 MMOL/L (ref 135–145)
SP GR UR STRIP.AUTO: 1.02
TRIGL SERPL-MCNC: 185 MG/DL (ref 0–149)
TSH SERPL DL<=0.005 MIU/L-ACNC: 2.63 UIU/ML (ref 0.38–5.33)
UROBILINOGEN UR STRIP.AUTO-MCNC: 0.2 EU/DL
VIT B12 SERPL-MCNC: 922 PG/ML (ref 211–911)
WBC # BLD AUTO: 4.9 K/UL (ref 4.8–10.8)
WBC #/AREA URNS HPF: ABNORMAL /HPF

## 2024-11-15 PROCEDURE — 82306 VITAMIN D 25 HYDROXY: CPT

## 2024-11-15 PROCEDURE — 85025 COMPLETE CBC W/AUTO DIFF WBC: CPT

## 2024-11-15 PROCEDURE — 87389 HIV-1 AG W/HIV-1&-2 AB AG IA: CPT

## 2024-11-15 PROCEDURE — 81001 URINALYSIS AUTO W/SCOPE: CPT

## 2024-11-15 PROCEDURE — 84443 ASSAY THYROID STIM HORMONE: CPT

## 2024-11-15 PROCEDURE — 36415 COLL VENOUS BLD VENIPUNCTURE: CPT

## 2024-11-15 PROCEDURE — 82607 VITAMIN B-12: CPT

## 2024-11-15 PROCEDURE — 80061 LIPID PANEL: CPT

## 2024-11-15 PROCEDURE — 80053 COMPREHEN METABOLIC PANEL: CPT

## 2024-11-19 RX ORDER — BECLOMETHASONE DIPROPIONATE 80 UG/1
AEROSOL, METERED NASAL
Qty: 10.6 G | Refills: 1 | Status: SHIPPED | OUTPATIENT
Start: 2024-11-19

## 2024-11-19 NOTE — TELEPHONE ENCOUNTER
Received request via: Pharmacy    Was the patient seen in the last year in this department? Yes    Does the patient have an active prescription (recently filled or refills available) for medication(s) requested? No    Pharmacy Name: [facility]    Does the patient have long term Plus and need 100-day supply? (This applies to ALL medications) Patient does not have SCP

## 2024-12-05 ENCOUNTER — APPOINTMENT (OUTPATIENT)
Dept: RADIOLOGY | Facility: IMAGING CENTER | Age: 59
End: 2024-12-05
Attending: PHYSICIAN ASSISTANT
Payer: COMMERCIAL

## 2024-12-05 ENCOUNTER — OFFICE VISIT (OUTPATIENT)
Dept: URGENT CARE | Facility: PHYSICIAN GROUP | Age: 59
End: 2024-12-05
Payer: COMMERCIAL

## 2024-12-05 VITALS
RESPIRATION RATE: 16 BRPM | OXYGEN SATURATION: 93 % | WEIGHT: 180 LBS | HEIGHT: 66 IN | HEART RATE: 72 BPM | SYSTOLIC BLOOD PRESSURE: 106 MMHG | BODY MASS INDEX: 28.93 KG/M2 | DIASTOLIC BLOOD PRESSURE: 68 MMHG | TEMPERATURE: 97.3 F

## 2024-12-05 DIAGNOSIS — M54.9 MID BACK PAIN ON RIGHT SIDE: ICD-10-CM

## 2024-12-05 PROCEDURE — 99213 OFFICE O/P EST LOW 20 MIN: CPT | Performed by: PHYSICIAN ASSISTANT

## 2024-12-05 PROCEDURE — 3078F DIAST BP <80 MM HG: CPT | Performed by: PHYSICIAN ASSISTANT

## 2024-12-05 PROCEDURE — 3074F SYST BP LT 130 MM HG: CPT | Performed by: PHYSICIAN ASSISTANT

## 2024-12-05 PROCEDURE — 72070 X-RAY EXAM THORAC SPINE 2VWS: CPT | Mod: TC | Performed by: RADIOLOGY

## 2024-12-05 RX ORDER — NIRMATRELVIR AND RITONAVIR 300-100 MG
KIT ORAL
COMMUNITY
Start: 2024-09-12 | End: 2024-12-10

## 2024-12-05 RX ORDER — BENZONATATE 100 MG/1
100 CAPSULE ORAL 3 TIMES DAILY
COMMUNITY
Start: 2024-09-12

## 2024-12-05 ASSESSMENT — ENCOUNTER SYMPTOMS
BACK PAIN: 1
PARESTHESIAS: 0
SHORTNESS OF BREATH: 0
EYE DISCHARGE: 0
FEVER: 0
EYE REDNESS: 0

## 2024-12-05 ASSESSMENT — FIBROSIS 4 INDEX: FIB4 SCORE: 1.59

## 2024-12-05 NOTE — PROGRESS NOTES
Subjective     Kaley Dean is a 59 y.o. female who presents with Back Pain (Lower back pain since September )          This is a new problem.  The patient presents to clinic complaining of ongoing right sided mid back pain times 2.5 x 3 months.  The patient is concerned that this pain may be related to her right kidney.  The patient states she was diagnosed with COVID-19 in September.  The patient states she was prescribed Paxlovid for her COVID-19 illness.  The patient states she accidentally took the Paxlovid incorrectly, taking 3/day rather than 2.  The patient states she consulted her daughter who is a pharmacist.  The patient states her daughter advised her to drink lots of fluids.  The patient states following her COVID-19 illness she developed right mid back pain, which has been persistent and worsening.  The patient reports no radiation of pain.  She also reports no recent injury or trauma to her right mid back.  The patient initially thought her mid back pain may be related to a displaced rib from coughing.  The patient reports no recent falls.  The patient reports no urinary symptoms.  No dysuria.  No hematuria.  The patient states her urine is dark in color at times, but admits she does not always drink enough water.  The patient reports a history of a previous neck fusion.  The patient states she does have radiculopathy symptoms related to her neck.  The patient reports no new numbness, tingling, or weakness.  The patient denies persistent chest pain and shortness of breath.  The patient states she has been under increased stress, and noticed some intermittent symptoms of anxiety.  The patient is taking OTC Aleve as needed but is cautious as she is concerned this may be kidney related.  The patient has not taken any additional OTC medications for her current symptoms.  The patient states she did have recent blood work done that was ordered through primary care, and is scheduled to follow-up with  her primary care provider in the coming weeks.    Back Pain  This is a new problem. Episode onset: x 2.5-3 months. The problem occurs constantly. The problem is unchanged. The pain is present in the thoracic spine (The patient states the pain is located to the right mid back.). The quality of the pain is described as aching. Pertinent negatives include no chest pain, dysuria, fever or paresthesias. She has tried NSAIDs for the symptoms.     PMH:  has a past medical history of Allergy, Arthritis, Asthma (08/28/2018), Chickenpox, COVID (12/30/2022), Depression, GERD (gastroesophageal reflux disease), [place] measles, Hyperlipidemia, Influenza, Mumps, Pain (08/28/2018), and Tonsillitis.  MEDS:   Current Outpatient Medications:     benzonatate (TESSALON) 100 MG Cap, Take 100 mg by mouth 3 times a day., Disp: , Rfl:     PAXLOVID, 300/100, 20 x 150 MG & 10 x 100MG Tablet Therapy Pack, TAKE THREE TABLETS BY MOUTH TWICE DAILY FOR 5 DAYS., Disp: , Rfl:     QNASL 80 MCG/ACT Aero Soln, 2 sprays each nostril once daily, Disp: 10.6 g, Rfl: 1    DULoxetine (CYMBALTA) 30 MG Cap DR Particles, Take 1 Capsule by mouth every day., Disp: 90 Capsule, Rfl: 0    methylPREDNISolone (MEDROL DOSEPAK) 4 MG Tablet Therapy Pack, Take 4 mg by mouth every morning., Disp: , Rfl:     hydrocortisone 2.5 % Cream topical cream, , Disp: , Rfl:     pravastatin (PRAVACHOL) 40 MG tablet, Take 1 Tablet by mouth every evening., Disp: 90 Tablet, Rfl: 3    albuterol 108 (90 Base) MCG/ACT Aero Soln inhalation aerosol, Inhale 2 Puffs every four hours as needed for Shortness of Breath., Disp: 6.7 g, Rfl: 3    montelukast (SINGULAIR) 10 MG Tab, Take 1 Tablet by mouth every day., Disp: 90 Tablet, Rfl: 3    omeprazole (PRILOSEC) 40 MG delayed-release capsule, Take 1 Capsule by mouth every day., Disp: 90 Capsule, Rfl: 3    fluticasone-salmeterol (ADVAIR) 500-50 MCG/ACT AEROSOL POWDER, BREATH ACTIVATED, Inhale 1 Puff every 12 hours., Disp: 60 Each, Rfl: 11     cyclobenzaprine (FLEXERIL) 5 mg tablet, Take 1-2 Tablets by mouth at bedtime., Disp: 12 Tablet, Rfl: 0    valACYclovir (VALTREX) 500 MG Tab, Take 500 mg by mouth every day., Disp: , Rfl:     progesterone (PROMETRIUM) 100 MG Cap, , Disp: , Rfl:     estradiol (CLIMARA) 0.0375 MG/24HR patch, Place 1 Patch on the skin every 7 days., Disp: 12 Patch, Rfl: 3    Magnesium 500 MG Cap, Take  by mouth., Disp: , Rfl:     RESTASIS 0.05 % ophthalmic emulsion, , Disp: , Rfl:     cetirizine (ZYRTEC) 10 MG TABS, Take 1 Tablet by mouth every day., Disp: , Rfl:     vitamin D (CHOLECALCIFEROL) 1000 UNIT TABS, Take 2 Tablets by mouth every day., Disp: , Rfl:     Multiple Vitamins-Minerals (OCUVITE PO), Take 1 Tab by mouth every evening., Disp: , Rfl:   ALLERGIES:   Allergies   Allergen Reactions    Miacalcin [Kdc:Calcitonin+Chlorobutanol]      Skin turns RED.    Simvastatin      Muscle cramps    Sulfa Drugs Hives     swelling     SURGHX:   Past Surgical History:   Procedure Laterality Date    PB REPAIR INTERCARP/CARP-METACARP JT Right 5/9/2024    Procedure: RIGHT THUMB CARPOMETACARPAL ARTHROPLASTY;  Surgeon: Mehrdad Orantes M.D.;  Location: [facility];  Service: Orthopedics    PB TRANSPLANT FOREARM/WRIST TENDON Right 5/9/2024    Procedure: RIGHT THUMB FLEXOR CARPI RADIALIS TENDON TRANSFER;  Surgeon: Mehrdad Orantes M.D.;  Location: [facility];  Service: Orthopedics    CERVICAL DISK AND FUSION ANTERIOR N/A 09/10/2018    Procedure: CERVICAL DISK AND FUSION ANTERIOR- C5-7 WITH PLATE;  Surgeon: Wallace Zamora M.D.;  [facility];  Service: Neurosurgery    TUBAL COAGULATION LAPAROSCOPIC BILATERAL  01/01/1994    LAMINOTOMY       SOCHX:  reports that she quit smoking about 37 years ago. Her smoking use included cigarettes. She started smoking about 49 years ago. She has a 24 pack-year smoking history. She has never used smokeless tobacco. She reports current alcohol use of about 3.6  "oz of alcohol per week. She reports that she does not use drugs.  FH: Family history was reviewed, no pertinent findings to report      Review of Systems   Constitutional:  Negative for fever.   Eyes:  Negative for discharge and redness.   Respiratory:  Negative for shortness of breath.    Cardiovascular:  Negative for chest pain.   Genitourinary:  Negative for dysuria.   Musculoskeletal:  Positive for back pain.   Neurological:  Negative for paresthesias.              Objective     /68 (BP Location: Left arm, Patient Position: Sitting, BP Cuff Size: Adult)   Pulse 72   Temp 36.3 °C (97.3 °F) (Temporal)   Resp 16   Ht 1.676 m (5' 6\")   Wt 81.6 kg (180 lb)   LMP 06/29/2019 (Approximate)   SpO2 93%   BMI 29.05 kg/m²      Physical Exam  Constitutional:       General: She is not in acute distress.     Appearance: Normal appearance. She is well-developed.   HENT:      Head: Normocephalic and atraumatic.      Right Ear: External ear normal.      Left Ear: External ear normal.      Nose: Nose normal.   Eyes:      Extraocular Movements: Extraocular movements intact.      Conjunctiva/sclera: Conjunctivae normal.   Cardiovascular:      Rate and Rhythm: Normal rate and regular rhythm.      Heart sounds: Normal heart sounds.   Pulmonary:      Effort: Pulmonary effort is normal. No respiratory distress.      Breath sounds: Normal breath sounds. No wheezing.   Musculoskeletal:      Cervical back: Normal range of motion and neck supple.      Comments:   Right Mid Back:  Tenderness to the right paraspinal region of the mid back with palpable muscle knot/tightness overlying the distal right posterior ribs.  No midline/bony tenderness.  No palpable step-off.  No edema.  No ecchymosis.  No overlying erythema.  No increased warmth.  No rashes/lesions.  No external signs of trauma.   Skin:     General: Skin is warm and dry.   Neurological:      Mental Status: She is alert and oriented to person, place, and time.          "      Progress:  Thoracic Back XR:  COMPARISON: None.     FINDINGS:  The alignment is normal. There is no evidence of fracture.     No focal compression deformities are identified. There is mild degenerative disc disease.     IMPRESSION:  1.  No compression deformity or acute fracture.     2.  Mild degenerative disc disease.    Reviewed the patient's most recent lab work from 11/15/2024.  The patient's BUN and creatinine were within normal limits.  The patient's EGFR was also within normal limits.  The patient's POCT urinalysis showed no signs of infection with negative leukocyte esterase, negative blood and negative nitrites.             Assessment & Plan        Assessment & Plan  Mid back pain on right side    Orders:    DX-THORACIC SPINE-2 VIEWS; Future    The patient's presenting symptoms and physical exam findings are consistent with right sided mid back pain.  On physical exam, the patient tenderness to the right paraspinal region of the mid back with palpable muscle knot/tightness overlying the distal right posterior ribs.  The patient had no midline/bony tenderness or palpable step-off.  The patient also had no edema, ecchymosis, overlying erythema, increased warmth, rashes/lesions, or external signs of trauma.  The remainder the patient's physical exam today in clinic was normal.  The patient is nontoxic and appears in no acute distress.  The patient's vital signs are stable and within normal limits.  She is afebrile today in clinic.  The patient is concerned her right sided mid back pain may be related to her kidneys after this using Paxlovid in September.  The patient states she was diagnosed with COVID-19 in September.  The patient states she was prescribed Paxlovid for her COVID-19 illness.  The patient states she accidentally took the Paxlovid incorrectly, taking 3/day rather than 2.  The patient states she consulted her daughter who is a pharmacist.  The patient states her daughter advised her to drink  lots of fluids.  The patient states following her COVID-19 illness she developed right mid back pain, which has been persistent and worsening.  The patient recently had lab work done on 11/15/2024 that was ordered from her primary care provider.  I reviewed the patient's lab work today in clinic, which showed a normal creatinine and BUN.  The patient's EGFR was also within normal limits.  The patient's POCT urinalysis at that time showed no signs of infection with negative leukocyte esterase, negative blood, and negative nitrates.  Given the patient's lab results, I have low clinical suspicion for renal dysfunction.  I do suspect the patient's ongoing right sided mid back pain to be musculoskeletal in nature.  An x-ray of the patient's thoracic spine was obtained today in clinic.  The patient's thoracic spine x-ray showed no compression deformity or acute fracture.  The patient did have mild degenerative disc disease.  The patient reports a history of neck and back problems.  The patient states she is established with a spine surgeon.  Instructed the patient to follow-up with spine surgery as needed.  The patient also has an upcoming primary care appointment scheduled.  Instructed the patient to follow-up with primary care as scheduled.  Advised the patient to monitor for worsening signs and or symptoms.  Recommend OTC medications and supportive care for symptomatic management.  Informed the patient she may take her muscle relaxer as prescribed to see if it helps with her current symptoms.  Recommend the patient follow-up with primary care as above.  Discussed strict return precautions with the patient, and she verbalized understanding.    Differential diagnoses, supportive care, and indications for immediate follow-up discussed with patient.   Instructed to return to clinic or nearest emergency department for any change in condition, further concerns, or worsening of symptoms.    OTC NSAIDs for  pain/discomfort  Alternate ice and heat to the affected area for symptomatic relief  Home stretches as discussed in clinic  Follow-up with PCP as scheduled  Follow-up with spine surgeon  Return to clinic or go to the ED if symptoms worsen or fail to improve, or if the patient should develop worsening/increasing back pain, radiation of pain, numbness, tingling, or weakness to the lower extremities, incontinence of bladder/bowel, paresthesias, difficulty walking, chest pain, shortness of breath fever/chills, and/or any concerning symptoms.     Discussed plan with the patient, and she agrees to the above.     I personally reviewed prior external notes and test results pertinent to today's visit.  I have independently reviewed and interpreted all diagnostics ordered during this urgent care visit.     Please note that this dictation was created using voice recognition software. I have made every reasonable attempt to correct obvious errors, but I expect that there may be errors of grammar and possibly content that I did not discover before finalizing the note.     This note was electronically signed by Ninfa Gillespie PA-C

## 2024-12-10 ENCOUNTER — OFFICE VISIT (OUTPATIENT)
Dept: MEDICAL GROUP | Facility: IMAGING CENTER | Age: 59
End: 2024-12-10
Payer: COMMERCIAL

## 2024-12-10 VITALS
OXYGEN SATURATION: 98 % | DIASTOLIC BLOOD PRESSURE: 80 MMHG | HEART RATE: 74 BPM | HEIGHT: 66 IN | SYSTOLIC BLOOD PRESSURE: 122 MMHG | BODY MASS INDEX: 29.89 KG/M2 | TEMPERATURE: 97.9 F | WEIGHT: 186 LBS | RESPIRATION RATE: 16 BRPM

## 2024-12-10 DIAGNOSIS — E66.811 CLASS 1 OBESITY DUE TO EXCESS CALORIES WITHOUT SERIOUS COMORBIDITY WITH BODY MASS INDEX (BMI) OF 30.0 TO 30.9 IN ADULT: ICD-10-CM

## 2024-12-10 DIAGNOSIS — E55.9 VITAMIN D DEFICIENCY: ICD-10-CM

## 2024-12-10 DIAGNOSIS — Z91.89 10 YEAR RISK OF MI OR STROKE < 7.5%: ICD-10-CM

## 2024-12-10 DIAGNOSIS — E78.2 MIXED HYPERLIPIDEMIA: ICD-10-CM

## 2024-12-10 DIAGNOSIS — E66.09 CLASS 1 OBESITY DUE TO EXCESS CALORIES WITHOUT SERIOUS COMORBIDITY WITH BODY MASS INDEX (BMI) OF 30.0 TO 30.9 IN ADULT: ICD-10-CM

## 2024-12-10 PROCEDURE — 1126F AMNT PAIN NOTED NONE PRSNT: CPT | Performed by: PHYSICIAN ASSISTANT

## 2024-12-10 PROCEDURE — 3079F DIAST BP 80-89 MM HG: CPT | Performed by: PHYSICIAN ASSISTANT

## 2024-12-10 PROCEDURE — 99214 OFFICE O/P EST MOD 30 MIN: CPT | Performed by: PHYSICIAN ASSISTANT

## 2024-12-10 PROCEDURE — 3074F SYST BP LT 130 MM HG: CPT | Performed by: PHYSICIAN ASSISTANT

## 2024-12-10 ASSESSMENT — PAIN SCALES - GENERAL: PAINLEVEL_OUTOF10: NO PAIN

## 2024-12-10 ASSESSMENT — FIBROSIS 4 INDEX: FIB4 SCORE: 1.59

## 2024-12-11 ENCOUNTER — TELEPHONE (OUTPATIENT)
Dept: MEDICAL GROUP | Facility: IMAGING CENTER | Age: 59
End: 2024-12-11
Payer: COMMERCIAL

## 2024-12-11 NOTE — PATIENT INSTRUCTIONS
It was a pleasure meeting with you today at [facility]!    Your medical history/records and medications were reviewed today.     UPDATE on MyChart Results: If you have blood work, and/or imaging studies, or any other test or procedure completed, you will have access to results as soon as they become available in MyChart. Recently, these results will be available for review at the same time that your provider is able to see results!    This will likely mean you will see a result before your provider has had a chance to review and discuss with you.  Some results or care notes may be hard to understand and may be serious in nature.    We look at every result and your provider will contact you to explain what they mean and discuss appropriate next steps. Please allow for at least 72 business hours for chart and result review.     We prefer that you wait for your care team to contact you with your results.  Often, your provider will discuss your results with you at your next appointment. We look forward to continuing to partner with you in your care.    Please review my practice information below:    If you have any prescription refill requests, please send them via [facility] or discuss with your provider at the start of your office visits. Please allow 3-5 business days for lab and testing review and you will be contacted via [facility] with those results, or if advised to make a follow up appointment regarding those results, then please do so.     Once resulted, your lab/test/imaging results will show up automatically in your MyChart. Please wait for my interpretation and recommendations prior to viewing your results to avoid any unnecessary confusion or misinterpretation. I will address all of the lab values that I interpret as abnormal and message you accordingly on your MyChart. I will always send you a message about your results even if they are normal. If you do not hear back from me within 5-7 business  days after completing your tests, then please send me a message on [facility] so I can obtain your results (especially if you went to an outside lab or imaging center - [facility], [facility], etc).     If you have any additional questions or concerns beyond my interpretation of your results, please make an appointment with me to discuss in further detail.    Please only use the [facility] messaging system for questions regarding your most recent appointment or if advised to use otherwise (glucose or blood pressure reporting).     If you have any new problems or concerns, you must make an appointment to discuss. This includes any referral requests, lab requests (unless advised to notify me for pre-appt labs), medication side effects, or request for medication adjustments.     Please arrive 15 minutes prior to your appointment time to complete your check-in and intake with the medical assistant.      Thank you,    Ema Lee PA-C ([place])  Physician Assistant Certified  [facility]    -----------------------------------------------------------------    Attn: Patients of [facility]:    In an effort to continue to provide excellent and efficient care to our patients, it is vital that we continue to use our resources appropriately. With that, this is a reminder that [facility] is used for prescription refill requests, test results, virtual visits, and chart review only.     Any new questions, concerns/conditions, lab/imaging requests, medication adjustments, new prescriptions, or referral requests do require an appointment (virtually or in person), unless discussed otherwise at your most recent appointment.     Thank you for your understanding,    [facility]

## 2024-12-11 NOTE — ASSESSMENT & PLAN NOTE
Chronic, uncontrolled.  Patient interested in GLP-1 analogs.  She trialed phentermine at a weight loss clinic and had high blood pressure readings while taking the medication.  She is been working on portion control and focusing on whole foods and less fast food.  She is walking and trying to stay active throughout the day.  No specific strength training at this time.

## 2024-12-11 NOTE — PROGRESS NOTES
Subjective:     CC:   Chief Complaint   Patient presents with    Follow-Up     On lab results from 11/15/24     Weight Loss     Wants to start weight loss medication        HPI:   Kaley presents today to discuss:    Class 1 obesity due to excess calories without serious comorbidity with body mass index (BMI) of 30.0 to 30.9 in adult  Chronic, uncontrolled.  Patient interested in GLP-1 analogs.  She trialed phentermine at a weight loss clinic and had high blood pressure readings while taking the medication.  She is been working on portion control and focusing on whole foods and less fast food.  She is walking and trying to stay active throughout the day.  No specific strength training at this time.    Mixed hyperlipidemia  Patient with history of statin intolerance.  Has not been consistent with taking pravastatin.  Was not on her statin when she got her labs drawn.    The 10-year ASCVD risk score (Osmani CARTER, et al., 2019) is: 4.3%    Values used to calculate the score:      Age: 59 years      Sex: Female      Is Non- : No      Diabetic: No      Tobacco smoker: No      Systolic Blood Pressure: 122 mmHg      Is BP treated: No      HDL Cholesterol: 53 mg/dL      Total Cholesterol: 279 mg/dL    Past Medical History:   Diagnosis Date    Allergy     Arthritis     Asthma 08/28/2018    with inhalers    Chickenpox     COVID 12/30/2022    Depression     GERD (gastroesophageal reflux disease)     Portuguese measles     Hyperlipidemia     Influenza     Mumps     Pain 08/28/2018    hands, arms, shoulder and neck    Tonsillitis      Family History   Problem Relation Age of Onset    Hypertension Mother     Kidney Disease Mother     Respiratory Disease Mother         COPD    Stroke Mother     Lung Disease Mother         COPD    Hyperlipidemia Mother     Cancer Father     Hyperlipidemia Father     Drug abuse Sister     Psychiatric Illness Sister     Seizures Brother     Stroke Brother     Heart Disease Brother      Psychiatric Illness Daughter     Psychiatric Illness Daughter      Past Surgical History:   Procedure Laterality Date    PB REPAIR INTERCARP/CARP-METACARP JT Right 2024    Procedure: RIGHT THUMB CARPOMETACARPAL ARTHROPLASTY;  Surgeon: Mehrdad Orantes M.D.;  Location: [facility];  Service: Orthopedics    PB TRANSPLANT FOREARM/WRIST TENDON Right 2024    Procedure: RIGHT THUMB FLEXOR CARPI RADIALIS TENDON TRANSFER;  Surgeon: Mehrdad Orantes M.D.;  Location: [facility];  Service: Orthopedics    CERVICAL DISK AND FUSION ANTERIOR N/A 09/10/2018    Procedure: CERVICAL DISK AND FUSION ANTERIOR- C5-7 WITH PLATE;  Surgeon: Wallace Zamora M.D.;  Location: [facility];  Service: Neurosurgery    TUBAL COAGULATION LAPAROSCOPIC BILATERAL  1994    LAMINOTOMY       Social History     Tobacco Use    Smoking status: Former     Current packs/day: 0.00     Average packs/day: 2.0 packs/day for 12.0 years (24.0 ttl pk-yrs)     Types: Cigarettes     Start date: 1975     Quit date: 1987     Years since quittin.9    Smokeless tobacco: Never   Vaping Use    Vaping status: Never Used   Substance Use Topics    Alcohol use: Yes     Alcohol/week: 3.6 oz     Types: 6 Glasses of wine per week    Drug use: No     Social History     Social History Narrative    Not on file     Current Outpatient Medications Ordered in Epic   Medication Sig Dispense Refill    Semaglutide,0.25 or 0.5MG/DOS, 2 MG/3ML Solution Pen-injector Inject 0.5 mg under the skin every 7 days. 3 mL 1    benzonatate (TESSALON) 100 MG Cap Take 100 mg by mouth 3 times a day.      QNASL 80 MCG/ACT Aero Soln 2 sprays each nostril once daily 10.6 g 1    DULoxetine (CYMBALTA) 30 MG Cap DR Particles Take 1 Capsule by mouth every day. 90 Capsule 0    pravastatin (PRAVACHOL) 40 MG tablet Take 1 Tablet by mouth every evening. 90 Tablet 3    albuterol 108 (90 Base) MCG/ACT Aero Soln inhalation aerosol Inhale 2  "Puffs every four hours as needed for Shortness of Breath. 6.7 g 3    montelukast (SINGULAIR) 10 MG Tab Take 1 Tablet by mouth every day. 90 Tablet 3    omeprazole (PRILOSEC) 40 MG delayed-release capsule Take 1 Capsule by mouth every day. 90 Capsule 3    fluticasone-salmeterol (ADVAIR) 500-50 MCG/ACT AEROSOL POWDER, BREATH ACTIVATED Inhale 1 Puff every 12 hours. 60 Each 11    cyclobenzaprine (FLEXERIL) 5 mg tablet Take 1-2 Tablets by mouth at bedtime. 12 Tablet 0    valACYclovir (VALTREX) 500 MG Tab Take 500 mg by mouth every day.      progesterone (PROMETRIUM) 100 MG Cap       estradiol (CLIMARA) 0.0375 MG/24HR patch Place 1 Patch on the skin every 7 days. 12 Patch 3    Magnesium 500 MG Cap Take  by mouth.      RESTASIS 0.05 % ophthalmic emulsion       cetirizine (ZYRTEC) 10 MG TABS Take 1 Tablet by mouth every day.      vitamin D (CHOLECALCIFEROL) 1000 UNIT TABS Take 2 Tablets by mouth every day.      Multiple Vitamins-Minerals (OCUVITE PO) Take 1 Tab by mouth every evening.       No current Epic-ordered facility-administered medications on file.     Miacalcin [kdc:calcitonin+chlorobutanol], Simvastatin, and Sulfa drugs    PMH/PSH/FH/Social history reviewed.  Vaccinations discussed.  Previous records and labs reviewed. Discussed age appropriate anticipatory guidance.    ROS: see hpi  Gen: no fevers/chills  Pulm: no sob, no cough  CV: no chest pain, no palpitations, no edema  GI: no nausea/vomiting, no diarrhea  Skin: no rash    Objective:   Exam:  /80 (BP Location: Right arm, Patient Position: Sitting, BP Cuff Size: Large adult)   Pulse 74   Temp 36.6 °C (97.9 °F) (Temporal)   Resp 16   Ht 1.676 m (5' 6\")   Wt 84.4 kg (186 lb)   LMP 06/29/2019 (Approximate)   SpO2 98%   BMI 30.02 kg/m²    Body mass index is 30.02 kg/m².    Gen: Alert and oriented, No apparent distress.  HEENT: Head atraumatic, normocephalic. Pupils equal and round.  Neck: Neck is supple without lymphadenopathy.   Lungs: Normal " effort, CTA bilaterally, no wheezes, rhonchi, or rales  CV: Regular rate and rhythm. No murmurs, rubs, or gallops.  Ext: No clubbing, cyanosis, edema.    Assessment & Plan:     59 y.o. female with the following -     1. 10 year risk of MI or stroke < 7.5%  Check calcium score, if elevated will refer to vascular versus cardiology to discuss other options for cholesterol control such as PCSK9 inhibitors.  - CT-CARDIAC SCORING; Future    2. Mixed hyperlipidemia  - CT-CARDIAC SCORING; Future  - Semaglutide,0.25 or 0.5MG/DOS, 2 MG/3ML Solution Pen-injector; Inject 0.5 mg under the skin every 7 days.  Dispense: 3 mL; Refill: 1    3. Class 1 obesity due to excess calories without serious comorbidity with body mass index (BMI) of 30.0 to 30.9 in adult  Will prescribe GLP therapy for weight loss.  Discussed the pathophysiology of GLP-1 treatment.  Would recommend intake of smaller meals and a weight training regimen 2 to 3 days a week to maintain lean body mass. Side effects may include nausea from delayed gastric emptying, weight loss -including fat and lean body mass loss.  No personal or family history of medullary thyroid cancer.  No personal history of pancreatitis.    Please continue working on lifestyle modifications.  There are 4 types of exercise that are recommended.  Endurance training includes 150 minutes or more of moderate-intensity activity each week as tolerated.  It is also recommended to incorporate exercises to help with flexibility and balance twice per week, and weight/resistance training twice per week.  The benefits of weight training include increased strength of the bones, muscles, and connective tissues which ultimately helps to stabilize your joints, lower risk of injury, decrease fall risk, improve your metabolism, and improve quality of life long-term.  If you are weightlifting twice a week, one day can be focus more on your lower body and the other on your upper body.  If you need assistance  with a weight training program, please consult with a certified  or schedule a consultation in my office for guidance.    Additionally, please follow a healthy diet that is low in saturated fat, sodium, and cholesterol, such as the mediterranean diet.  Please focus on a diet that is high in fruits, vegetables, whole grains, beans, nuts, and seeds, includes olive oil as an important source of monounsaturated fat. You may also consider a plant-based diet.  Please also increase your dietary fiber intake to 25 to 30 g a day and limit added sugar to <30 g/day.      In regards to alcohol intake, the 2020 to 2025 Dietary Guidelines for Americans advise no more than two drinks per day for males and one drink per day for nonpregnant females, although complete cessation would be ideal to reduce risks of long term health effects from alcohol.    Please consume at least 5-6 servings of fruits and vegetables per day. Studies show that consuming 5 servings of fruits and vegetables per day can increase life expectancy by 3 years.  Even eating 2.5 servings of fruits and vegetables per day can be associated with a 16% reduced risk of heart disease, 18% reduced risk of stroke, 4% reduced risk of cancer, and 15% reduced risk of premature death.    I would also recommend protein consumption with every meal.  Protein sources include beans, lentils, chickpeas, nuts and seeds, eggs, high protein yogurt (>15 g), cottage cheese, lean meats such as salmon and tuna, soy products i.e. tofu, as well as supplementary protein powders that are low in added sugar.  - Semaglutide,0.25 or 0.5MG/DOS, 2 MG/3ML Solution Pen-injector; Inject 0.5 mg under the skin every 7 days.  Dispense: 3 mL; Refill: 1    4. Vitamin D deficiency  Start vitamin D 2000 IU daily.    Return in about 6 weeks (around 1/22/2025) for Medication recheck.    Ema Lee PA-C ([facility])  Physician Assistant Certified  [facility]      Please note that this  dictation was created using voice recognition software. I have made every reasonable attempt to correct obvious errors, but I expect that there are errors of grammar and possibly content that I did not discover before finalizing the note.

## 2024-12-11 NOTE — ASSESSMENT & PLAN NOTE
Patient with history of statin intolerance.  Has not been consistent with taking pravastatin.  Was not on her statin when she got her labs drawn.

## 2024-12-11 NOTE — TELEPHONE ENCOUNTER
Manjeet from [facility] called regarding this patient's recent prescription of Semaglutide,0.25 or 0.5MG.    They are wondering if the medication is for diabetes or not. If it is not for diabetes, then it has to be changed to Wegovy from Ozempic. They also want to verify that the patient is getting the starter dose for the medication.

## 2024-12-12 ENCOUNTER — APPOINTMENT (OUTPATIENT)
Dept: MEDICAL GROUP | Facility: IMAGING CENTER | Age: 59
End: 2024-12-12
Payer: COMMERCIAL

## 2024-12-12 DIAGNOSIS — E78.2 MIXED HYPERLIPIDEMIA: ICD-10-CM

## 2024-12-12 DIAGNOSIS — E66.811 CLASS 1 OBESITY DUE TO EXCESS CALORIES WITHOUT SERIOUS COMORBIDITY WITH BODY MASS INDEX (BMI) OF 30.0 TO 30.9 IN ADULT: ICD-10-CM

## 2024-12-12 DIAGNOSIS — E66.09 CLASS 1 OBESITY DUE TO EXCESS CALORIES WITHOUT SERIOUS COMORBIDITY WITH BODY MASS INDEX (BMI) OF 30.0 TO 30.9 IN ADULT: ICD-10-CM

## 2024-12-12 RX ORDER — SEMAGLUTIDE 0.25 MG/.5ML
0.25 INJECTION, SOLUTION SUBCUTANEOUS
Qty: 2 ML | Refills: 0 | Status: SHIPPED | OUTPATIENT
Start: 2024-12-12 | End: 2024-12-31

## 2024-12-30 ENCOUNTER — HOSPITAL ENCOUNTER (OUTPATIENT)
Dept: RADIOLOGY | Facility: MEDICAL CENTER | Age: 59
End: 2024-12-30
Attending: PHYSICIAN ASSISTANT
Payer: COMMERCIAL

## 2024-12-30 DIAGNOSIS — E78.2 MIXED HYPERLIPIDEMIA: ICD-10-CM

## 2024-12-30 DIAGNOSIS — Z91.89 10 YEAR RISK OF MI OR STROKE < 7.5%: ICD-10-CM

## 2024-12-30 PROCEDURE — 4410556 CT-CARDIAC SCORING (SELF PAY ONLY)

## 2024-12-31 ENCOUNTER — PATIENT MESSAGE (OUTPATIENT)
Dept: MEDICAL GROUP | Facility: IMAGING CENTER | Age: 59
End: 2024-12-31
Payer: COMMERCIAL

## 2024-12-31 DIAGNOSIS — E66.09 CLASS 1 OBESITY DUE TO EXCESS CALORIES WITHOUT SERIOUS COMORBIDITY WITH BODY MASS INDEX (BMI) OF 30.0 TO 30.9 IN ADULT: ICD-10-CM

## 2024-12-31 DIAGNOSIS — E78.2 MIXED HYPERLIPIDEMIA: ICD-10-CM

## 2024-12-31 DIAGNOSIS — E66.811 CLASS 1 OBESITY DUE TO EXCESS CALORIES WITHOUT SERIOUS COMORBIDITY WITH BODY MASS INDEX (BMI) OF 30.0 TO 30.9 IN ADULT: ICD-10-CM

## 2024-12-31 RX ORDER — SEMAGLUTIDE 0.5 MG/.5ML
0.5 INJECTION, SOLUTION SUBCUTANEOUS
Qty: 2 ML | Refills: 0 | Status: SHIPPED | OUTPATIENT
Start: 2024-12-31

## 2025-01-27 DIAGNOSIS — E66.811 CLASS 1 OBESITY DUE TO EXCESS CALORIES WITHOUT SERIOUS COMORBIDITY WITH BODY MASS INDEX (BMI) OF 30.0 TO 30.9 IN ADULT: ICD-10-CM

## 2025-01-27 DIAGNOSIS — E78.2 MIXED HYPERLIPIDEMIA: ICD-10-CM

## 2025-01-27 DIAGNOSIS — E66.09 CLASS 1 OBESITY DUE TO EXCESS CALORIES WITHOUT SERIOUS COMORBIDITY WITH BODY MASS INDEX (BMI) OF 30.0 TO 30.9 IN ADULT: ICD-10-CM

## 2025-02-24 DIAGNOSIS — E66.811 CLASS 1 OBESITY DUE TO EXCESS CALORIES WITHOUT SERIOUS COMORBIDITY WITH BODY MASS INDEX (BMI) OF 30.0 TO 30.9 IN ADULT: ICD-10-CM

## 2025-02-24 DIAGNOSIS — E66.09 CLASS 1 OBESITY DUE TO EXCESS CALORIES WITHOUT SERIOUS COMORBIDITY WITH BODY MASS INDEX (BMI) OF 30.0 TO 30.9 IN ADULT: ICD-10-CM

## 2025-02-25 ENCOUNTER — TELEPHONE (OUTPATIENT)
Dept: MEDICAL GROUP | Facility: IMAGING CENTER | Age: 60
End: 2025-02-25
Payer: COMMERCIAL

## 2025-02-25 DIAGNOSIS — E66.09 CLASS 1 OBESITY DUE TO EXCESS CALORIES WITHOUT SERIOUS COMORBIDITY WITH BODY MASS INDEX (BMI) OF 30.0 TO 30.9 IN ADULT: ICD-10-CM

## 2025-02-25 DIAGNOSIS — E66.811 CLASS 1 OBESITY DUE TO EXCESS CALORIES WITHOUT SERIOUS COMORBIDITY WITH BODY MASS INDEX (BMI) OF 30.0 TO 30.9 IN ADULT: ICD-10-CM

## 2025-02-25 NOTE — TELEPHONE ENCOUNTER
[facility] pharmacy left a VM saying that semaglutide needs to be 3. Ml instead of 1.75 ml

## 2025-03-16 DIAGNOSIS — E66.811 CLASS 1 OBESITY DUE TO EXCESS CALORIES WITHOUT SERIOUS COMORBIDITY WITH BODY MASS INDEX (BMI) OF 30.0 TO 30.9 IN ADULT: ICD-10-CM

## 2025-03-16 DIAGNOSIS — E66.09 CLASS 1 OBESITY DUE TO EXCESS CALORIES WITHOUT SERIOUS COMORBIDITY WITH BODY MASS INDEX (BMI) OF 30.0 TO 30.9 IN ADULT: ICD-10-CM

## 2025-03-17 NOTE — TELEPHONE ENCOUNTER
Received request via: Patient    Was the patient seen in the last year in this department? Yes    Does the patient have an active prescription (recently filled or refills available) for medication(s) requested? No    Pharmacy Name: [facility]    Does the patient have [facility] and need 100-day supply? (This applies to ALL medications) Patient does not have SCP

## 2025-04-08 ENCOUNTER — APPOINTMENT (OUTPATIENT)
Dept: RADIOLOGY | Facility: IMAGING CENTER | Age: 60
End: 2025-04-08
Attending: NURSE PRACTITIONER
Payer: COMMERCIAL

## 2025-04-08 ENCOUNTER — OFFICE VISIT (OUTPATIENT)
Dept: URGENT CARE | Facility: PHYSICIAN GROUP | Age: 60
End: 2025-04-08
Payer: COMMERCIAL

## 2025-04-08 VITALS
TEMPERATURE: 100 F | RESPIRATION RATE: 12 BRPM | WEIGHT: 164.3 LBS | BODY MASS INDEX: 26.41 KG/M2 | OXYGEN SATURATION: 96 % | HEART RATE: 112 BPM | DIASTOLIC BLOOD PRESSURE: 62 MMHG | HEIGHT: 66 IN | SYSTOLIC BLOOD PRESSURE: 104 MMHG

## 2025-04-08 DIAGNOSIS — J22 LOWER RESPIRATORY TRACT INFECTION: ICD-10-CM

## 2025-04-08 DIAGNOSIS — R05.1 ACUTE COUGH: ICD-10-CM

## 2025-04-08 DIAGNOSIS — R09.02 HYPOXIA: ICD-10-CM

## 2025-04-08 PROCEDURE — 71046 X-RAY EXAM CHEST 2 VIEWS: CPT | Mod: TC | Performed by: RADIOLOGY

## 2025-04-08 PROCEDURE — 99214 OFFICE O/P EST MOD 30 MIN: CPT | Mod: 25 | Performed by: NURSE PRACTITIONER

## 2025-04-08 PROCEDURE — 94640 AIRWAY INHALATION TREATMENT: CPT | Performed by: NURSE PRACTITIONER

## 2025-04-08 RX ORDER — BENZONATATE 100 MG/1
100 CAPSULE ORAL 3 TIMES DAILY PRN
Qty: 30 CAPSULE | Refills: 0 | Status: SHIPPED | OUTPATIENT
Start: 2025-04-08 | End: 2025-04-18

## 2025-04-08 RX ORDER — DEXTROMETHORPHAN HYDROBROMIDE AND PROMETHAZINE HYDROCHLORIDE 15; 6.25 MG/5ML; MG/5ML
5 SYRUP ORAL EVERY 6 HOURS PRN
Qty: 100 ML | Refills: 0 | Status: SHIPPED | OUTPATIENT
Start: 2025-04-08

## 2025-04-08 RX ORDER — IPRATROPIUM BROMIDE AND ALBUTEROL SULFATE 2.5; .5 MG/3ML; MG/3ML
3 SOLUTION RESPIRATORY (INHALATION) ONCE
Status: COMPLETED | OUTPATIENT
Start: 2025-04-08 | End: 2025-04-08

## 2025-04-08 RX ORDER — PREDNISONE 10 MG/1
40 TABLET ORAL DAILY
Qty: 20 TABLET | Refills: 0 | Status: SHIPPED | OUTPATIENT
Start: 2025-04-08 | End: 2025-04-13

## 2025-04-08 RX ADMIN — IPRATROPIUM BROMIDE AND ALBUTEROL SULFATE 3 ML: 2.5; .5 SOLUTION RESPIRATORY (INHALATION) at 15:15

## 2025-04-08 ASSESSMENT — FIBROSIS 4 INDEX: FIB4 SCORE: 1.59

## 2025-04-08 NOTE — PROGRESS NOTES
Verbal consent was acquired by the patient to use [employer] ambient listening note generation during this visit          Chief Complaint   Patient presents with    Difficulty Breathing     Low O2 83%, tight chest, fever, cough, x1 week           History of Present Illness  The patient is a 59-year-old female who presents for evaluation of cough, congestion, and low oxygen saturation.    She has been experiencing symptoms consistent with a common cold for the past week, initially presenting as rhinorrhea and sneezing, which she attributed to allergies. The symptoms subsequently progressed to include chest involvement, causing significant discomfort. She also reports exposure to potential pathogens from her daughter and grandson, who were recently diagnosed with an ear infection and croup, respectively.    She has been monitoring her oxygen saturation at home due to a personal history of asthma and a family history of COPD in her mother. She observed a drop in her oxygen saturation to the low 80s while at rest, which improved to the 90s upon mobilization. This hypoxemia could potentially explain her reported nocturnal shakiness. Despite the use of inhalers and a steroid nasal spray, her symptoms have not improved. She has been managing her congestion with Sudafed during the day and NyQuil at night. She also reports experiencing chills and fever, although she does not have a reliable thermometer at home. Additionally, she reports a sore throat, ear pain, and dental discomfort. She has a rescue inhaler at home.    FAMILY HISTORY  Her mother had COPD.    MEDICATIONS  Current: Sudafed, NyQuil         ROS:    No severe shortness of breath   No cardiac like chest pain, as discussed   As otherwise stated in HPI    Medical/SX/ Social History:  Reviewed per chart    Pertinent Medications:    Current Outpatient Medications on File Prior to Visit   Medication Sig Dispense Refill    Semaglutide (WEGOVY) 1.7 MG/0.75ML Solution  Auto-injector Pen-injector Inject 0.75 mL under the skin every 7 days. 3 mL 0    QNASL 80 MCG/ACT Aero Soln 2 sprays each nostril once daily 10.6 g 1    DULoxetine (CYMBALTA) 30 MG Cap DR Particles Take 1 Capsule by mouth every day. 90 Capsule 0    pravastatin (PRAVACHOL) 40 MG tablet Take 1 Tablet by mouth every evening. 90 Tablet 3    albuterol 108 (90 Base) MCG/ACT Aero Soln inhalation aerosol Inhale 2 Puffs every four hours as needed for Shortness of Breath. 6.7 g 3    montelukast (SINGULAIR) 10 MG Tab Take 1 Tablet by mouth every day. 90 Tablet 3    omeprazole (PRILOSEC) 40 MG delayed-release capsule Take 1 Capsule by mouth every day. 90 Capsule 3    fluticasone-salmeterol (ADVAIR) 500-50 MCG/ACT AEROSOL POWDER, BREATH ACTIVATED Inhale 1 Puff every 12 hours. 60 Each 11    cyclobenzaprine (FLEXERIL) 5 mg tablet Take 1-2 Tablets by mouth at bedtime. 12 Tablet 0    valACYclovir (VALTREX) 500 MG Tab Take 500 mg by mouth every day.      progesterone (PROMETRIUM) 100 MG Cap       estradiol (CLIMARA) 0.0375 MG/24HR patch Place 1 Patch on the skin every 7 days. 12 Patch 3    Magnesium 500 MG Cap Take  by mouth.      RESTASIS 0.05 % ophthalmic emulsion       cetirizine (ZYRTEC) 10 MG TABS Take 1 Tablet by mouth every day.      vitamin D (CHOLECALCIFEROL) 1000 UNIT TABS Take 2 Tablets by mouth every day.      Multiple Vitamins-Minerals (OCUVITE PO) Take 1 Tab by mouth every evening.       No current facility-administered medications on file prior to visit.        Allergies:    Miacalcin [kdc:calcitonin+chlorobutanol], Simvastatin, and Sulfa drugs     Problem list, medications, and allergies reviewed by myself today in Epic     Physical Exam:    Vitals:    04/08/25 1446   BP: 104/62   Pulse: (!) 112   Resp: 12   Temp: 37.8 °C (100 °F)   SpO2: 96%             Physical Exam  Vitals and nursing note reviewed.   Constitutional:       General: She is not in acute distress.     Appearance: Normal appearance. She is not  ill-appearing or toxic-appearing.   HENT:      Head: Normocephalic and atraumatic.      Nose: Nose normal.      Mouth/Throat:      Mouth: Mucous membranes are moist.      Pharynx: Oropharynx is clear.   Eyes:      Extraocular Movements: Extraocular movements intact.      Conjunctiva/sclera: Conjunctivae normal.      Pupils: Pupils are equal, round, and reactive to light.   Cardiovascular:      Rate and Rhythm: Normal rate and regular rhythm.      Pulses: Normal pulses.      Heart sounds: Normal heart sounds.   Pulmonary:      Effort: Pulmonary effort is normal.   Musculoskeletal:         General: Normal range of motion.      Cervical back: Normal range of motion and neck supple.   Skin:     General: Skin is warm.      Capillary Refill: Capillary refill takes less than 2 seconds.   Neurological:      General: No focal deficit present.      Mental Status: She is alert and oriented to person, place, and time.            Diagnostics:      RADIOLOGY RESULTS   DX-CHEST-2 VIEWS  Result Date: 4/8/2025 4/8/2025 3:06 PM HISTORY/REASON FOR EXAM:  Cough. TECHNIQUE/EXAM DESCRIPTION AND NUMBER OF VIEWS: Two views of the chest. COMPARISON:  05/17/2023 FINDINGS: The heart is normal in size. No pulmonary infiltrates or consolidations are noted. No pleural effusions are appreciated.     No active disease.             Medical Decision making and plan :  I personally reviewed prior external notes and test results pertinent to today's visit. Pt is clinically stable at today's acute urgent care visit.  Patient appears nontoxic with no acute distress noted. Appropriate for outpatient care at this time.    Pleasant 59 y.o. female presented clinic with:     Assessment & Plan  1. LRTI.  She has been experiencing symptoms consistent with a common cold for the past week, initially presenting as rhinorrhea and sneezing, which she attributed to allergies. The symptoms subsequently progressed to include chest involvement, causing significant  discomfort. She also reports exposure to potential pathogens from her daughter and grandson, who were recently diagnosed with an ear infection and croup, respectively. A chest x-ray was negative.  She was provided nebulizer treatment in clinic with improvement in her breath sounds and felt much better.      Due to duration of symptoms, underlying asthma, and failure of OTC therapies, antibiotic was written for treatment of bacterial etiology for lower respiratory tract infection. Continue OTC supportive therapies. Flonase, OTC allergy meds, avoid night time dairy, increased fluids and humidification recommended. Patient given precautionary s/sx that mandate immediate follow up and evaluation in the ED. Advised of risks of not doing so. DDX, Supportive care, and indications for immediate follow-up discussed with patient.  Instructed to return to clinic or nearest emergency department if we are not available for any change in condition, further concerns, or worsening of symptoms.    The patient demonstrated a good understanding and agreed with the treatment plan              Shared decision-making was utilized with patient for treatment plan. Medication discussed included indication for use and the potential benefits and side effects. Education was provided regarding the aforementioned assessments.  Differential Diagnosis, natural history, and supportive care discussed. All of the patient's questions were answered to their satisfaction at the time of discharge. Patient was encouraged to monitor symptoms closely. Those signs and symptoms which would warrant concern and mandate seeking a higher level of service through the emergency department discussed at length.  Patient stated agreement and understanding of this plan of care.    Disposition:  Home in stable condition     Voice Recognition Disclaimer:  Portions of this document were created using voice recognition software and [employer] technology provided by [employer]. The  software does have a chance of producing errors of grammar and possibly content. I have made every reasonable attempt to correct obvious errors, but there may be errors of grammar and possibly content that I did not discover before finalizing the  documentation.    NISSA Fishman.

## 2025-04-21 DIAGNOSIS — E66.09 CLASS 1 OBESITY DUE TO EXCESS CALORIES WITHOUT SERIOUS COMORBIDITY WITH BODY MASS INDEX (BMI) OF 30.0 TO 30.9 IN ADULT: ICD-10-CM

## 2025-04-21 DIAGNOSIS — E66.811 CLASS 1 OBESITY DUE TO EXCESS CALORIES WITHOUT SERIOUS COMORBIDITY WITH BODY MASS INDEX (BMI) OF 30.0 TO 30.9 IN ADULT: ICD-10-CM

## 2025-04-22 NOTE — TELEPHONE ENCOUNTER
Received request via: Patient    Was the patient seen in the last year in this department? Yes    Does the patient have an active prescription (recently filled or refills available) for medication(s) requested? No    [facility]    Does the patient have [facility] and need 100-day supply? (This applies to ALL medications) Patient does not have SCP

## 2025-05-12 ENCOUNTER — APPOINTMENT (OUTPATIENT)
Dept: MEDICAL GROUP | Facility: IMAGING CENTER | Age: 60
End: 2025-05-12
Payer: COMMERCIAL

## 2025-05-12 VITALS
TEMPERATURE: 98.5 F | BODY MASS INDEX: 25.65 KG/M2 | DIASTOLIC BLOOD PRESSURE: 58 MMHG | HEIGHT: 66 IN | HEART RATE: 77 BPM | OXYGEN SATURATION: 97 % | SYSTOLIC BLOOD PRESSURE: 112 MMHG | WEIGHT: 159.6 LBS

## 2025-05-12 DIAGNOSIS — E78.2 MIXED HYPERLIPIDEMIA: ICD-10-CM

## 2025-05-12 DIAGNOSIS — E66.3 OVERWEIGHT WITH BODY MASS INDEX (BMI) OF 25 TO 25.9 IN ADULT: ICD-10-CM

## 2025-05-12 DIAGNOSIS — R74.01 TRANSAMINITIS: ICD-10-CM

## 2025-05-12 DIAGNOSIS — Z12.31 ENCOUNTER FOR SCREENING MAMMOGRAM FOR BREAST CANCER: ICD-10-CM

## 2025-05-12 PROCEDURE — 1125F AMNT PAIN NOTED PAIN PRSNT: CPT | Performed by: PHYSICIAN ASSISTANT

## 2025-05-12 PROCEDURE — 99214 OFFICE O/P EST MOD 30 MIN: CPT | Performed by: PHYSICIAN ASSISTANT

## 2025-05-12 PROCEDURE — 3078F DIAST BP <80 MM HG: CPT | Performed by: PHYSICIAN ASSISTANT

## 2025-05-12 PROCEDURE — 3074F SYST BP LT 130 MM HG: CPT | Performed by: PHYSICIAN ASSISTANT

## 2025-05-12 ASSESSMENT — PATIENT HEALTH QUESTIONNAIRE - PHQ9
2. FEELING DOWN, DEPRESSED, IRRITABLE, OR HOPELESS: SEVERAL DAYS
5. POOR APPETITE OR OVEREATING: 0 - NOT AT ALL
SUM OF ALL RESPONSES TO PHQ QUESTIONS 1-9: 5
8. MOVING OR SPEAKING SO SLOWLY THAT OTHER PEOPLE COULD HAVE NOTICED. OR THE OPPOSITE, BEING SO FIGETY OR RESTLESS THAT YOU HAVE BEEN MOVING AROUND A LOT MORE THAN USUAL: NOT AT ALL
6. FEELING BAD ABOUT YOURSELF - OR THAT YOU ARE A FAILURE OR HAVE LET YOURSELF OR YOUR FAMILY DOWN: NOT AL ALL
SUM OF ALL RESPONSES TO PHQ QUESTIONS 1-9: 4
3. TROUBLE FALLING OR STAYING ASLEEP OR SLEEPING TOO MUCH: SEVERAL DAYS
7. TROUBLE CONCENTRATING ON THINGS, SUCH AS READING THE NEWSPAPER OR WATCHING TELEVISION: NOT AT ALL
CLINICAL INTERPRETATION OF PHQ2 SCORE: 2
SUM OF ALL RESPONSES TO PHQ9 QUESTIONS 1 AND 2: 2
4. FEELING TIRED OR HAVING LITTLE ENERGY: SEVERAL DAYS
5. POOR APPETITE OR OVEREATING: NOT AT ALL
1. LITTLE INTEREST OR PLEASURE IN DOING THINGS: SEVERAL DAYS
9. THOUGHTS THAT YOU WOULD BE BETTER OFF DEAD, OR OF HURTING YOURSELF: NOT AT ALL

## 2025-05-12 ASSESSMENT — PAIN SCALES - GENERAL: PAINLEVEL_OUTOF10: 3=SLIGHT PAIN

## 2025-05-12 ASSESSMENT — FIBROSIS 4 INDEX: FIB4 SCORE: 1.59

## 2025-05-12 NOTE — ASSESSMENT & PLAN NOTE
Patient continues to do well on Wegovy 1.7 mg weekly.  No side effects.  She is eating smaller portions and avoiding alcohol.  She has not been exercising/strength training.  Has a history of high cholesterol and transaminitis.  Has not been taking pravastatin as she had been having muscle cramping.

## 2025-05-12 NOTE — PATIENT INSTRUCTIONS
It was a pleasure meeting with you today at [facility]!    Your medical history/records and medications were reviewed today.     UPDATE on MyChart Results: If you have blood work, and/or imaging studies, or any other test or procedure completed, you will have access to results as soon as they become available in MyChart. Recently, these results will be available for review at the same time that your provider is able to see results!    This will likely mean you will see a result before your provider has had a chance to review and discuss with you.  Some results or care notes may be hard to understand and may be serious in nature.    We look at every result and your provider will contact you to explain what they mean and discuss appropriate next steps. Please allow for at least 72 business hours for chart and result review.     We prefer that you wait for your care team to contact you with your results.  Often, your provider will discuss your results with you at your next appointment. We look forward to continuing to partner with you in your care.    Please review my practice information below:    If you have any prescription refill requests, please send them via [employer] or discuss with your provider at the start of your office visits. Please allow 3-5 business days for lab and testing review and you will be contacted via [employer] with those results, or if advised to make a follow up appointment regarding those results, then please do so.     Once resulted, your lab/test/imaging results will show up automatically in your MyChart. Please wait for my interpretation and recommendations prior to viewing your results to avoid any unnecessary confusion or misinterpretation. I will address all of the lab values that I interpret as abnormal and message you accordingly on your MyChart. I will always send you a message about your results even if they are normal. If you do not hear back from me within 5-7 business  days after completing your tests, then please send me a message on [employer] so I can obtain your results (especially if you went to an outside lab or imaging center - [facility], [facility], etc).     If you have any additional questions or concerns beyond my interpretation of your results, please make an appointment with me to discuss in further detail.    Please only use the [employer] messaging system for questions regarding your most recent appointment or if advised to use otherwise (glucose or blood pressure reporting).     If you have any new problems or concerns, you must make an appointment to discuss. This includes any referral requests, lab requests (unless advised to notify me for pre-appt labs), medication side effects, or request for medication adjustments.     Please arrive 15 minutes prior to your appointment time to complete your check-in and intake with the medical assistant.      Thank you,    Ema Lee PA-C ([facility])  Physician Assistant Certified  [facility]    -----------------------------------------------------------------    Attn: Patients of [facility]:    In an effort to continue to provide excellent and efficient care to our patients, it is vital that we continue to use our resources appropriately. With that, this is a reminder that [employer] is used for prescription refill requests, test results, virtual visits, and chart review only.     Any new questions, concerns/conditions, lab/imaging requests, medication adjustments, new prescriptions, or referral requests do require an appointment (virtually or in person), unless discussed otherwise at your most recent appointment.     Thank you for your understanding,    [facility]

## 2025-05-12 NOTE — PROGRESS NOTES
Subjective:     CC:   Chief Complaint   Patient presents with    Requesting Labs    Medication Refill     Wegovy clearance       HPI:   Kaley presents today to discuss:    Overweight with body mass index (BMI) of 25 to 25.9 in adult  Patient continues to do well on Wegovy 1.7 mg weekly.  No side effects.  She is eating smaller portions and avoiding alcohol.  She has not been exercising/strength training.  Has a history of high cholesterol and transaminitis.  Has not been taking pravastatin as she had been having muscle cramping.      Past Medical History:   Diagnosis Date    Allergy     Arthritis     Asthma 08/28/2018    with inhalers    Chickenpox     COVID 12/30/2022    Depression     GERD (gastroesophageal reflux disease)     [place] measles     Hyperlipidemia     Influenza     Mumps     Pain 08/28/2018    hands, arms, shoulder and neck    Sleep apnea     Tonsillitis      Family History   Problem Relation Age of Onset    Hypertension Mother     Kidney Disease Mother     Respiratory Disease Mother         COPD    Stroke Mother     Lung Disease Mother         COPD    Hyperlipidemia Mother     Cancer Father     Hyperlipidemia Father     Drug abuse Sister     Psychiatric Illness Sister     Seizures Brother     Stroke Brother     Heart Disease Brother     Psychiatric Illness Daughter     Psychiatric Illness Daughter      Past Surgical History:   Procedure Laterality Date    PB REPAIR INTERCARP/CARP-METACARP JT Right 05/09/2024    Procedure: RIGHT THUMB CARPOMETACARPAL ARTHROPLASTY;  Surgeon: Mehrdad Orantes M.D.;  Location: [facility] [place];  Service: Orthopedics    PB TRANSPLANT FOREARM/WRIST TENDON Right 05/09/2024    Procedure: RIGHT THUMB FLEXOR CARPI RADIALIS TENDON TRANSFER;  Surgeon: Mehrdad Orantes M.D.;  Location: [facility] [place];  Service: Orthopedics    CERVICAL DISK AND FUSION ANTERIOR N/A 09/10/2018    Procedure: CERVICAL DISK AND FUSION ANTERIOR- C5-7 WITH PLATE;  Surgeon:  Wallace Zamora M.D.;  Location: SURGERY [facility];  Service: Neurosurgery    TUBAL COAGULATION LAPAROSCOPIC BILATERAL  1994    HAND SURGERY  2024    LAMINOTOMY       Social History     Tobacco Use    Smoking status: Former     Current packs/day: 0.00     Average packs/day: 2.0 packs/day for 12.0 years (24.0 ttl pk-yrs)     Types: Cigarettes     Start date: 1975     Quit date: 1987     Years since quittin.3    Smokeless tobacco: Never   Vaping Use    Vaping status: Never Used   Substance Use Topics    Alcohol use: Yes     Alcohol/week: 0.6 - 1.2 oz     Types: 1 - 2 Glasses of wine per week     Comment: rarely    Drug use: No     Social History     Social History Narrative    Not on file     Current Outpatient Medications Ordered in Epic   Medication Sig Dispense Refill    FIBER GUMMIES PO Take  by mouth.      Semaglutide (WEGOVY) 1.7 MG/0.75ML Solution Auto-injector Pen-injector Inject 0.75 mL under the skin every 7 days. 9 mL 0    promethazine-dextromethorphan (PROMETHAZINE-DM) 6.25-15 MG/5ML syrup Take 5 mL by mouth every 6 hours as needed for Cough for up to 20 doses. 100 mL 0    QNASL 80 MCG/ACT Aero Soln 2 sprays each nostril once daily 10.6 g 1    DULoxetine (CYMBALTA) 30 MG Cap DR Particles Take 1 Capsule by mouth every day. 90 Capsule 0    albuterol 108 (90 Base) MCG/ACT Aero Soln inhalation aerosol Inhale 2 Puffs every four hours as needed for Shortness of Breath. 6.7 g 3    montelukast (SINGULAIR) 10 MG Tab Take 1 Tablet by mouth every day. 90 Tablet 3    omeprazole (PRILOSEC) 40 MG delayed-release capsule Take 1 Capsule by mouth every day. 90 Capsule 3    fluticasone-salmeterol (ADVAIR) 500-50 MCG/ACT AEROSOL POWDER, BREATH ACTIVATED Inhale 1 Puff every 12 hours. 60 Each 11    cyclobenzaprine (FLEXERIL) 5 mg tablet Take 1-2 Tablets by mouth at bedtime. 12 Tablet 0    valACYclovir (VALTREX) 500 MG Tab Take 500 mg by mouth every day.      progesterone (PROMETRIUM) 100 MG Cap    "    estradiol (CLIMARA) 0.0375 MG/24HR patch Place 1 Patch on the skin every 7 days. 12 Patch 3    Magnesium 500 MG Cap Take  by mouth.      RESTASIS 0.05 % ophthalmic emulsion       cetirizine (ZYRTEC) 10 MG TABS Take 1 Tablet by mouth every day.      vitamin D (CHOLECALCIFEROL) 1000 UNIT TABS Take 2 Tablets by mouth every day.      Multiple Vitamins-Minerals (OCUVITE PO) Take 1 Tab by mouth every evening.      pravastatin (PRAVACHOL) 40 MG tablet Take 1 Tablet by mouth every evening. (Patient not taking: Reported on 5/12/2025) 90 Tablet 3     No current Epic-ordered facility-administered medications on file.     Miacalcin [kdc:calcitonin+chlorobutanol], Simvastatin, and Sulfa drugs    PMH/PSH/FH/Social history reviewed.  Vaccinations discussed.  Previous records and labs reviewed. Discussed age appropriate anticipatory guidance.    ROS: see hpi  Gen: no fevers/chills  Pulm: no sob, no cough  CV: no chest pain, no palpitations, no edema  GI: no nausea/vomiting, no diarrhea  Skin: no rash    Objective:   Exam:  /58 (BP Location: Left arm, Patient Position: Sitting, BP Cuff Size: Adult)   Pulse 77   Temp 36.9 °C (98.5 °F) (Temporal)   Ht 1.676 m (5' 6\")   Wt 72.4 kg (159 lb 9.6 oz)   LMP 06/29/2019 (Approximate)   SpO2 97%   BMI 25.76 kg/m²    Body mass index is 25.76 kg/m².    Gen: Alert and oriented, No apparent distress.  HEENT: Head atraumatic, normocephalic. Pupils equal and round.  Lungs: Normal effort, CTA bilaterally, no wheezes, rhonchi, or rales  CV: Regular rate and rhythm. No murmurs, rubs, or gallops.  Ext: No clubbing, cyanosis, edema.    Assessment & Plan:     59 y.o. female with the following -     1. Overweight with body mass index (BMI) of 25 to 25.9 in adult  Continue 1.7 mg dose of Wegovy.  Advised patient that strength training is essential in order to maintain and build lean body mass to support long-term weight loss and reduce risk of comorbidities.  - Semaglutide (WEGOVY) 1.7 " MG/0.75ML Solution Auto-injector Pen-injector; Inject 0.75 mL under the skin every 7 days.  Dispense: 9 mL; Refill: 0    2. Mixed hyperlipidemia  Chronic, previous statin intolerance.  Will check updated lipid profile with weight loss, alcohol cessation, and dietary changes.  - Lipid Profile; Future    3. Transaminitis  Recheck with alcohol cessation and weight loss.  - Comp Metabolic Panel; Future    4. Encounter for screening mammogram for breast cancer  - MA-SCREENING MAMMO BILAT W/TOMOSYNTHESIS W/CAD; Future    Return in about 13 weeks (around 8/11/2025) for Medication recheck.    Ema Lee PA-C ([place])  Physician Assistant Certified  [facility]      Please note that this dictation was created using voice recognition software. I have made every reasonable attempt to correct obvious errors, but I expect that there are errors of grammar and possibly content that I did not discover before finalizing the note.

## 2025-07-07 DIAGNOSIS — E78.2 MIXED HYPERLIPIDEMIA: ICD-10-CM

## 2025-07-07 DIAGNOSIS — R10.13 DYSPEPSIA: ICD-10-CM

## 2025-07-07 DIAGNOSIS — E66.3 OVERWEIGHT WITH BODY MASS INDEX (BMI) OF 25 TO 25.9 IN ADULT: ICD-10-CM

## 2025-07-07 DIAGNOSIS — J45.40 MODERATE PERSISTENT ASTHMA WITHOUT COMPLICATION: ICD-10-CM

## 2025-07-07 RX ORDER — OMEPRAZOLE 40 MG/1
40 CAPSULE, DELAYED RELEASE ORAL DAILY
Qty: 90 CAPSULE | Refills: 3 | Status: SHIPPED | OUTPATIENT
Start: 2025-07-07

## 2025-07-07 RX ORDER — FLUTICASONE PROPIONATE AND SALMETEROL 500; 50 UG/1; UG/1
1 POWDER RESPIRATORY (INHALATION) EVERY 12 HOURS
Qty: 60 EACH | Refills: 11 | Status: SHIPPED | OUTPATIENT
Start: 2025-07-07

## 2025-07-07 RX ORDER — MONTELUKAST SODIUM 10 MG/1
10 TABLET ORAL DAILY
Qty: 90 TABLET | Refills: 3 | Status: SHIPPED | OUTPATIENT
Start: 2025-07-07

## 2025-07-07 NOTE — TELEPHONE ENCOUNTER
Received request via: Pharmacy    Was the patient seen in the last year in this department? Yes    Does the patient have an active prescription (recently filled or refills available) for medication(s) requested? No    Pharmacy Name: [facility]    Does the patient have [facility] and need 100-day supply? (This applies to ALL medications) Patient does not have SCP

## 2025-07-16 ENCOUNTER — TELEPHONE (OUTPATIENT)
Dept: MEDICAL GROUP | Facility: IMAGING CENTER | Age: 60
End: 2025-07-16
Payer: COMMERCIAL

## 2025-08-12 ENCOUNTER — APPOINTMENT (OUTPATIENT)
Dept: MEDICAL GROUP | Facility: IMAGING CENTER | Age: 60
End: 2025-08-12
Payer: COMMERCIAL

## 2025-08-18 DIAGNOSIS — E78.2 MIXED HYPERLIPIDEMIA: Primary | ICD-10-CM

## 2025-08-18 DIAGNOSIS — E66.3 OVERWEIGHT WITH BODY MASS INDEX (BMI) OF 25 TO 25.9 IN ADULT: ICD-10-CM

## 2025-08-18 RX ORDER — SEMAGLUTIDE 2.4 MG/.75ML
2.4 INJECTION, SOLUTION SUBCUTANEOUS
Qty: 9 ML | Refills: 0 | Status: SHIPPED | OUTPATIENT
Start: 2025-08-18

## 2025-08-25 RX ORDER — BECLOMETHASONE DIPROPIONATE 80 UG/1
AEROSOL, METERED NASAL
Qty: 10.6 G | Refills: 0 | Status: SHIPPED | OUTPATIENT
Start: 2025-08-25

## 2025-10-20 ENCOUNTER — APPOINTMENT (OUTPATIENT)
Dept: MEDICAL GROUP | Facility: IMAGING CENTER | Age: 60
End: 2025-10-20
Payer: COMMERCIAL

## (undated) DEVICE — SUTURE GENERAL

## (undated) DEVICE — PLATE PIN GOLDIN (2TX3=6)

## (undated) DEVICE — TUBING C&T SET FLYING LEADS DRAIN TUBING (10EA/BX)

## (undated) DEVICE — GOWN SURGEONS LARGE - (32/CA)

## (undated) DEVICE — NEPTUNE 4 PORT MANIFOLD - (20/PK)

## (undated) DEVICE — HEAD HOLDER JUNIOR/ADULT

## (undated) DEVICE — SUTURE 4-0 VICRYL PLUS TF - 8 X 18 INCH (12/BX)

## (undated) DEVICE — KIT SURGIFLO W/OUT THROMBIN - (6EA/CA)

## (undated) DEVICE — PACK NEURO - (2EA/CA)

## (undated) DEVICE — GLOVE BIOGEL SZ 8 SURGICAL PF LTX - (50PR/BX 4BX/CA)

## (undated) DEVICE — ELECTRODE DUAL RETURN W/ CORD - (50/PK)

## (undated) DEVICE — SUTURE 2-0 SILK 12 X 18" (36PK/BX)"

## (undated) DEVICE — GLOVE BIOGEL PI INDICATOR SZ 8.0 SURGICAL PF LF -(50/BX 4BX/CA)

## (undated) DEVICE — HEMOSTAT SURG ABSORBABLE - 2 X 3 IN SURGICEL (24EA/CA)

## (undated) DEVICE — TUBE EMG NIM TRIVANTAGE 7MM (3EA/PK)

## (undated) DEVICE — STERI STRIP COMPOUND BENZOIN - TINCTURE 0.6ML WITH APPLICATOR (40EA/BX)

## (undated) DEVICE — SODIUM CHL IRRIGATION 0.9% 1000ML (12EA/CA)

## (undated) DEVICE — LACTATED RINGERS INJ. 500 ML - (24EA/CA)

## (undated) DEVICE — BIT DRILL 11MM

## (undated) DEVICE — KIT ROOM DECONTAMINATION

## (undated) DEVICE — SLEEVE, VASO, THIGH, MED

## (undated) DEVICE — NEEDLE NON SAFETY HYPO 22 GA X 1 1/2 IN (100/BX)

## (undated) DEVICE — PROTECTOR ULNA NERVE - (36PR/CA)

## (undated) DEVICE — DRESSING TRANSPARENT FILM TEGADERM 4 X 4.75" (50EA/BX)"

## (undated) DEVICE — TOOL DISSECT MATCH HEAD

## (undated) DEVICE — SUCTION INSTRUMENT YANKAUER BULBOUS TIP W/O VENT (50EA/CA)

## (undated) DEVICE — KIT ANESTHESIA W/CIRCUIT & 3/LT BAG W/FILTER (20EA/CA)

## (undated) DEVICE — GLOVE SZ 6.5 BIOGEL PI MICRO - PF LF (50PR/BX)

## (undated) DEVICE — BOVIE BLADE COATED &INSULATED - 25/PK

## (undated) DEVICE — LACTATED RINGERS INJ 1000 ML - (14EA/CA 60CA/PF)

## (undated) DEVICE — MIDAS LUBRICATOR DIFFUSER PACK (4EA/CA)

## (undated) DEVICE — SUTURE 3-0 VICRYL PLUS RB-1 - 8 X 18 INCH (12/BX)

## (undated) DEVICE — NEEDLE SPINAL NON-SAFETY 18 GA X 3 IN (25EA/BX)

## (undated) DEVICE — GLOVE SZ 8 BIOGEL PI MICRO - PF LF (50PR/BX)

## (undated) DEVICE — SPONGE PEANUT - (5/PK 50PK/CA)

## (undated) DEVICE — MASK ANESTHESIA ADULT  - (100/CA)

## (undated) DEVICE — SET EXTENSION WITH 2 PORTS (48EA/CA) ***PART #2C8610 IS A SUBSTITUTE*****

## (undated) DEVICE — INTRAOP NEURO IN OR 1:1 PER 15 MIN

## (undated) DEVICE — CLOSURE SKIN STRIP 1/2 X 4 IN - (STERI STRIP) (50/BX 4BX/CA)

## (undated) DEVICE — GOWN WARMING STANDARD FLEX - (30/CA)

## (undated) DEVICE — GLOVE BIOGEL INDICATOR SZ 7SURGICAL PF LTX - (50/BX 4BX/CA)

## (undated) DEVICE — SHEET PEDIATRIC LAPAROTOMY - (10/CA)

## (undated) DEVICE — TUBING CLEARLINK DUO-VENT - C-FLO (48EA/CA)

## (undated) DEVICE — KIT EVACUATER 3 SPRING PVC LF 1/8 DRAIN SIZE (10EA/CA)"

## (undated) DEVICE — SURGIFOAM (12X7) - (12EA/CA)

## (undated) DEVICE — CANISTER SUCTION 3000ML MECHANICAL FILTER AUTO SHUTOFF MEDI-VAC NONSTERILE LF DISP  (40EA/CA)

## (undated) DEVICE — SET LEADWIRE 5 LEAD BEDSIDE DISPOSABLE ECG (1SET OF 5/EA)

## (undated) DEVICE — CHLORAPREP 26 ML APPLICATOR - ORANGE TINT(25/CA)

## (undated) DEVICE — ELECTRODE 850 FOAM ADHESIVE - HYDROGEL RADIOTRNSPRNT (50/PK)

## (undated) DEVICE — RESTRAINTS LIMB DISP. - (12/BX 4BX/CA)

## (undated) DEVICE — SYRINGE SAFETY 3 ML 18 GA X 1 1/2 BLUNT LL (100/BX 8BX/CA)

## (undated) DEVICE — SCREW DISTRACTION 14MM YELLOW - STERILE (10EA/BX) (5TX4=20)

## (undated) DEVICE — APPLICATOR COTTON TIP 6 IN - STERILE (2EA/PK 100PK/BX)

## (undated) DEVICE — SENSOR SPO2 NEO LNCS ADHESIVE (20/BX) SEE USER NOTES

## (undated) DEVICE — SPONGE GAUZESTER 4 X 4 4PLY - (128PK/CA)

## (undated) DEVICE — SYRINGE SAFETY 10 ML 18 GA X 1 1/2 BLUNT LL (100/BX 4BX/CA)

## (undated) DEVICE — DISSECT TOOL MIDAS REX